# Patient Record
Sex: FEMALE | Race: WHITE | NOT HISPANIC OR LATINO | Employment: PART TIME | URBAN - METROPOLITAN AREA
[De-identification: names, ages, dates, MRNs, and addresses within clinical notes are randomized per-mention and may not be internally consistent; named-entity substitution may affect disease eponyms.]

---

## 2021-08-09 ENCOUNTER — APPOINTMENT (OUTPATIENT)
Dept: GENERAL RADIOLOGY | Facility: HOSPITAL | Age: 36
End: 2021-08-09

## 2021-08-09 ENCOUNTER — HOSPITAL ENCOUNTER (INPATIENT)
Facility: HOSPITAL | Age: 36
LOS: 6 days | Discharge: HOME | End: 2021-08-15
Attending: EMERGENCY MEDICINE | Admitting: INTERNAL MEDICINE

## 2021-08-09 DIAGNOSIS — D64.9 SEVERE ANEMIA: ICD-10-CM

## 2021-08-09 DIAGNOSIS — K92.0 HEMATEMESIS WITH NAUSEA: ICD-10-CM

## 2021-08-09 DIAGNOSIS — I95.9 HYPOTENSION, UNSPECIFIED HYPOTENSION TYPE: Primary | ICD-10-CM

## 2021-08-09 PROBLEM — A41.9 SEPSIS: Status: ACTIVE | Noted: 2021-08-09

## 2021-08-09 PROBLEM — D62 ACUTE BLOOD LOSS ANEMIA: Status: ACTIVE | Noted: 2021-08-09

## 2021-08-09 PROBLEM — R74.8 ELEVATED LIVER ENZYMES: Status: ACTIVE | Noted: 2021-08-09

## 2021-08-09 PROBLEM — K92.2 GI BLEED: Status: ACTIVE | Noted: 2021-08-09

## 2021-08-09 PROBLEM — N39.0 URINARY TRACT INFECTION: Status: ACTIVE | Noted: 2021-08-09

## 2021-08-09 PROBLEM — N17.9 ACUTE KIDNEY INJURY: Status: ACTIVE | Noted: 2021-08-09

## 2021-08-09 PROBLEM — E87.1 HYPONATREMIA: Status: ACTIVE | Noted: 2021-08-09

## 2021-08-09 LAB
ABO GROUP BLD: NORMAL
ALBUMIN SERPL-MCNC: 1.9 G/DL (ref 3.5–5.2)
ALBUMIN/GLOB SERPL: 0.8 G/DL
ALP SERPL-CCNC: 80 U/L (ref 39–117)
ALT SERPL W P-5'-P-CCNC: 32 U/L (ref 1–33)
ANION GAP SERPL CALCULATED.3IONS-SCNC: 12 MMOL/L (ref 5–15)
APTT PPP: 33.6 SECONDS (ref 24–31)
ARTERIAL PATENCY WRIST A: POSITIVE
AST SERPL-CCNC: 42 U/L (ref 1–32)
ATMOSPHERIC PRESS: ABNORMAL MM[HG]
BACTERIA UR QL AUTO: ABNORMAL /HPF
BASE EXCESS BLDA CALC-SCNC: -2.2 MMOL/L (ref 0–3)
BASOPHILS # BLD AUTO: 0.1 10*3/MM3 (ref 0–0.2)
BASOPHILS NFR BLD AUTO: 0.9 % (ref 0–1.5)
BDY SITE: ABNORMAL
BILIRUB SERPL-MCNC: 4.3 MG/DL (ref 0–1.2)
BILIRUB UR QL STRIP: ABNORMAL
BLD GP AB SCN SERPL QL: NEGATIVE
BUN SERPL-MCNC: 32 MG/DL (ref 6–20)
BUN/CREAT SERPL: 12.9 (ref 7–25)
CALCIUM SPEC-SCNC: 7 MG/DL (ref 8.6–10.5)
CHLORIDE SERPL-SCNC: 96 MMOL/L (ref 98–107)
CLARITY UR: ABNORMAL
CO2 BLDA-SCNC: 21.8 MMOL/L (ref 22–29)
CO2 SERPL-SCNC: 22 MMOL/L (ref 22–29)
COD CRY URNS QL: ABNORMAL /HPF
COLOR UR: ABNORMAL
CREAT SERPL-MCNC: 2.48 MG/DL (ref 0.57–1)
CRP SERPL-MCNC: 0.4 MG/DL (ref 0–0.5)
D-LACTATE SERPL-SCNC: 3.3 MMOL/L (ref 0.5–2)
DEPRECATED RDW RBC AUTO: 58.2 FL (ref 37–54)
EOSINOPHIL # BLD AUTO: 0 10*3/MM3 (ref 0–0.4)
EOSINOPHIL NFR BLD AUTO: 0.1 % (ref 0.3–6.2)
ERYTHROCYTE [DISTWIDTH] IN BLOOD BY AUTOMATED COUNT: 16.4 % (ref 12.3–15.4)
GFR SERPL CREATININE-BSD FRML MDRD: 22 ML/MIN/1.73
GLOBULIN UR ELPH-MCNC: 2.4 GM/DL
GLUCOSE SERPL-MCNC: 102 MG/DL (ref 65–99)
GLUCOSE UR STRIP-MCNC: NEGATIVE MG/DL
HCO3 BLDA-SCNC: 21 MMOL/L (ref 21–28)
HCT VFR BLD AUTO: 16.9 % (ref 34–46.6)
HEMODILUTION: NO
HGB BLD-MCNC: 5.6 G/DL (ref 12–15.9)
HGB UR QL STRIP.AUTO: ABNORMAL
HOLD SPECIMEN: NORMAL
HOLD SPECIMEN: NORMAL
HYALINE CASTS UR QL AUTO: ABNORMAL /LPF
INHALED O2 CONCENTRATION: 21 %
INR PPP: 1.88 (ref 0.93–1.1)
KETONES UR QL STRIP: ABNORMAL
LEUKOCYTE ESTERASE UR QL STRIP.AUTO: ABNORMAL
LYMPHOCYTES # BLD AUTO: 2.3 10*3/MM3 (ref 0.7–3.1)
LYMPHOCYTES NFR BLD AUTO: 15.4 % (ref 19.6–45.3)
MAGNESIUM SERPL-MCNC: 1.8 MG/DL (ref 1.6–2.6)
MCH RBC QN AUTO: 33.5 PG (ref 26.6–33)
MCHC RBC AUTO-ENTMCNC: 32.9 G/DL (ref 31.5–35.7)
MCV RBC AUTO: 101.8 FL (ref 79–97)
MODALITY: ABNORMAL
MONOCYTES # BLD AUTO: 1.1 10*3/MM3 (ref 0.1–0.9)
MONOCYTES NFR BLD AUTO: 7 % (ref 5–12)
MRSA DNA SPEC QL NAA+PROBE: NORMAL
NEUTROPHILS NFR BLD AUTO: 11.5 10*3/MM3 (ref 1.7–7)
NEUTROPHILS NFR BLD AUTO: 76.6 % (ref 42.7–76)
NITRITE UR QL STRIP: NEGATIVE
NRBC BLD AUTO-RTO: 0 /100 WBC (ref 0–0.2)
NT-PROBNP SERPL-MCNC: 124.3 PG/ML (ref 0–450)
PCO2 BLDA: 27.1 MM HG (ref 35–48)
PH BLDA: 7.5 PH UNITS (ref 7.35–7.45)
PH UR STRIP.AUTO: 5.5 [PH] (ref 5–8)
PHOSPHATE SERPL-MCNC: 4.6 MG/DL (ref 2.5–4.5)
PLATELET # BLD AUTO: 103 10*3/MM3 (ref 140–450)
PMV BLD AUTO: 9 FL (ref 6–12)
PO2 BLDA: 76.6 MM HG (ref 83–108)
POTASSIUM SERPL-SCNC: 4.8 MMOL/L (ref 3.5–5.2)
PROCALCITONIN SERPL-MCNC: 0.59 NG/ML (ref 0–0.25)
PROT SERPL-MCNC: 4.3 G/DL (ref 6–8.5)
PROT UR QL STRIP: ABNORMAL
PROTHROMBIN TIME: 19.9 SECONDS (ref 9.6–11.7)
RBC # BLD AUTO: 1.66 10*6/MM3 (ref 3.77–5.28)
RBC # UR: ABNORMAL /HPF
REF LAB TEST METHOD: ABNORMAL
RH BLD: POSITIVE
SAO2 % BLDCOA: 96.6 % (ref 94–98)
SARS-COV-2 RNA PNL SPEC NAA+PROBE: NOT DETECTED
SODIUM SERPL-SCNC: 130 MMOL/L (ref 136–145)
SP GR UR STRIP: 1.02 (ref 1–1.03)
SQUAMOUS #/AREA URNS HPF: ABNORMAL /HPF
T&S EXPIRATION DATE: NORMAL
TRANS CELLS #/AREA URNS HPF: ABNORMAL /HPF
UROBILINOGEN UR QL STRIP: ABNORMAL
WBC # BLD AUTO: 15 10*3/MM3 (ref 3.4–10.8)
WBC UR QL AUTO: ABNORMAL /HPF
YEAST URNS QL MICRO: ABNORMAL /HPF

## 2021-08-09 PROCEDURE — 83735 ASSAY OF MAGNESIUM: CPT

## 2021-08-09 PROCEDURE — 84100 ASSAY OF PHOSPHORUS: CPT

## 2021-08-09 PROCEDURE — 71045 X-RAY EXAM CHEST 1 VIEW: CPT

## 2021-08-09 PROCEDURE — 86900 BLOOD TYPING SEROLOGIC ABO: CPT

## 2021-08-09 PROCEDURE — 86923 COMPATIBILITY TEST ELECTRIC: CPT

## 2021-08-09 PROCEDURE — 36600 WITHDRAWAL OF ARTERIAL BLOOD: CPT

## 2021-08-09 PROCEDURE — 25010000002 OCTREOTIDE PER 25 MCG: Performed by: EMERGENCY MEDICINE

## 2021-08-09 PROCEDURE — 86850 RBC ANTIBODY SCREEN: CPT | Performed by: EMERGENCY MEDICINE

## 2021-08-09 PROCEDURE — 84145 PROCALCITONIN (PCT): CPT | Performed by: NURSE PRACTITIONER

## 2021-08-09 PROCEDURE — 86901 BLOOD TYPING SEROLOGIC RH(D): CPT | Performed by: EMERGENCY MEDICINE

## 2021-08-09 PROCEDURE — 36415 COLL VENOUS BLD VENIPUNCTURE: CPT | Performed by: EMERGENCY MEDICINE

## 2021-08-09 PROCEDURE — 80074 ACUTE HEPATITIS PANEL: CPT | Performed by: NURSE PRACTITIONER

## 2021-08-09 PROCEDURE — 87040 BLOOD CULTURE FOR BACTERIA: CPT

## 2021-08-09 PROCEDURE — 86900 BLOOD TYPING SEROLOGIC ABO: CPT | Performed by: EMERGENCY MEDICINE

## 2021-08-09 PROCEDURE — 85610 PROTHROMBIN TIME: CPT

## 2021-08-09 PROCEDURE — 82105 ALPHA-FETOPROTEIN SERUM: CPT | Performed by: NURSE PRACTITIONER

## 2021-08-09 PROCEDURE — 85730 THROMBOPLASTIN TIME PARTIAL: CPT

## 2021-08-09 PROCEDURE — 87086 URINE CULTURE/COLONY COUNT: CPT | Performed by: NURSE PRACTITIONER

## 2021-08-09 PROCEDURE — 85025 COMPLETE CBC W/AUTO DIFF WBC: CPT

## 2021-08-09 PROCEDURE — P9016 RBC LEUKOCYTES REDUCED: HCPCS

## 2021-08-09 PROCEDURE — 86901 BLOOD TYPING SEROLOGIC RH(D): CPT

## 2021-08-09 PROCEDURE — 86140 C-REACTIVE PROTEIN: CPT

## 2021-08-09 PROCEDURE — 87635 SARS-COV-2 COVID-19 AMP PRB: CPT | Performed by: EMERGENCY MEDICINE

## 2021-08-09 PROCEDURE — 25010000002 CEFTRIAXONE PER 250 MG: Performed by: NURSE PRACTITIONER

## 2021-08-09 PROCEDURE — 99285 EMERGENCY DEPT VISIT HI MDM: CPT

## 2021-08-09 PROCEDURE — 83605 ASSAY OF LACTIC ACID: CPT

## 2021-08-09 PROCEDURE — 36430 TRANSFUSION BLD/BLD COMPNT: CPT

## 2021-08-09 PROCEDURE — 86038 ANTINUCLEAR ANTIBODIES: CPT | Performed by: NURSE PRACTITIONER

## 2021-08-09 PROCEDURE — 82803 BLOOD GASES ANY COMBINATION: CPT

## 2021-08-09 PROCEDURE — 81001 URINALYSIS AUTO W/SCOPE: CPT

## 2021-08-09 PROCEDURE — 93005 ELECTROCARDIOGRAM TRACING: CPT

## 2021-08-09 PROCEDURE — P9612 CATHETERIZE FOR URINE SPEC: HCPCS

## 2021-08-09 PROCEDURE — 87641 MR-STAPH DNA AMP PROBE: CPT | Performed by: NURSE PRACTITIONER

## 2021-08-09 PROCEDURE — 83880 ASSAY OF NATRIURETIC PEPTIDE: CPT | Performed by: NURSE PRACTITIONER

## 2021-08-09 PROCEDURE — 80053 COMPREHEN METABOLIC PANEL: CPT

## 2021-08-09 RX ORDER — ONDANSETRON 2 MG/ML
4 INJECTION INTRAMUSCULAR; INTRAVENOUS EVERY 6 HOURS PRN
Status: DISCONTINUED | OUTPATIENT
Start: 2021-08-09 | End: 2021-08-15 | Stop reason: HOSPADM

## 2021-08-09 RX ORDER — SODIUM CHLORIDE 0.9 % (FLUSH) 0.9 %
10 SYRINGE (ML) INJECTION AS NEEDED
Status: CANCELLED | OUTPATIENT
Start: 2021-08-09

## 2021-08-09 RX ORDER — SODIUM CHLORIDE 0.9 % (FLUSH) 0.9 %
10 SYRINGE (ML) INJECTION AS NEEDED
Status: DISCONTINUED | OUTPATIENT
Start: 2021-08-09 | End: 2021-08-15 | Stop reason: HOSPADM

## 2021-08-09 RX ORDER — IBUPROFEN 200 MG
600 TABLET ORAL EVERY 6 HOURS PRN
COMMUNITY
End: 2021-08-15 | Stop reason: HOSPADM

## 2021-08-09 RX ORDER — ACETAMINOPHEN 325 MG/1
650 TABLET ORAL EVERY 4 HOURS PRN
Status: DISCONTINUED | OUTPATIENT
Start: 2021-08-09 | End: 2021-08-15 | Stop reason: HOSPADM

## 2021-08-09 RX ORDER — ACETAMINOPHEN 650 MG/1
650 SUPPOSITORY RECTAL EVERY 4 HOURS PRN
Status: DISCONTINUED | OUTPATIENT
Start: 2021-08-09 | End: 2021-08-15 | Stop reason: HOSPADM

## 2021-08-09 RX ORDER — SODIUM CHLORIDE 9 MG/ML
100 INJECTION, SOLUTION INTRAVENOUS CONTINUOUS
Status: DISCONTINUED | OUTPATIENT
Start: 2021-08-09 | End: 2021-08-11

## 2021-08-09 RX ORDER — SODIUM CHLORIDE 0.9 % (FLUSH) 0.9 %
10 SYRINGE (ML) INJECTION EVERY 12 HOURS SCHEDULED
Status: DISCONTINUED | OUTPATIENT
Start: 2021-08-09 | End: 2021-08-15 | Stop reason: HOSPADM

## 2021-08-09 RX ORDER — ONDANSETRON 4 MG/1
4 TABLET, FILM COATED ORAL EVERY 6 HOURS PRN
Status: DISCONTINUED | OUTPATIENT
Start: 2021-08-09 | End: 2021-08-15 | Stop reason: HOSPADM

## 2021-08-09 RX ADMIN — SODIUM CHLORIDE 100 ML/HR: 9 INJECTION, SOLUTION INTRAVENOUS at 20:42

## 2021-08-09 RX ADMIN — SODIUM CHLORIDE 1000 ML: 0.9 INJECTION, SOLUTION INTRAVENOUS at 18:24

## 2021-08-09 RX ADMIN — WATER 1 G: 100 INJECTION, SOLUTION INTRAVENOUS at 20:30

## 2021-08-09 RX ADMIN — OCTREOTIDE ACETATE 25 MCG/HR: 500 INJECTION, SOLUTION INTRAVENOUS; SUBCUTANEOUS at 19:32

## 2021-08-09 RX ADMIN — FAMOTIDINE 20 MG: 10 INJECTION INTRAVENOUS at 18:35

## 2021-08-10 ENCOUNTER — APPOINTMENT (OUTPATIENT)
Dept: CARDIOLOGY | Facility: HOSPITAL | Age: 36
End: 2021-08-10

## 2021-08-10 ENCOUNTER — ANESTHESIA EVENT (OUTPATIENT)
Dept: GASTROENTEROLOGY | Facility: HOSPITAL | Age: 36
End: 2021-08-10

## 2021-08-10 ENCOUNTER — ANESTHESIA (OUTPATIENT)
Dept: GASTROENTEROLOGY | Facility: HOSPITAL | Age: 36
End: 2021-08-10

## 2021-08-10 ENCOUNTER — INPATIENT HOSPITAL (OUTPATIENT)
Dept: URBAN - METROPOLITAN AREA HOSPITAL 84 | Facility: HOSPITAL | Age: 36
End: 2021-08-10
Payer: COMMERCIAL

## 2021-08-10 VITALS — HEART RATE: 98 BPM | DIASTOLIC BLOOD PRESSURE: 60 MMHG | OXYGEN SATURATION: 100 % | SYSTOLIC BLOOD PRESSURE: 101 MMHG

## 2021-08-10 DIAGNOSIS — A41.9 SEPSIS, UNSPECIFIED ORGANISM: ICD-10-CM

## 2021-08-10 DIAGNOSIS — D50.0 IRON DEFICIENCY ANEMIA SECONDARY TO BLOOD LOSS (CHRONIC): ICD-10-CM

## 2021-08-10 DIAGNOSIS — R74.8 ABNORMAL LEVELS OF OTHER SERUM ENZYMES: ICD-10-CM

## 2021-08-10 DIAGNOSIS — B18.2 CHRONIC VIRAL HEPATITIS C: ICD-10-CM

## 2021-08-10 DIAGNOSIS — F10.11 ALCOHOL ABUSE, IN REMISSION: ICD-10-CM

## 2021-08-10 DIAGNOSIS — I85.00 ESOPHAGEAL VARICES WITHOUT BLEEDING: ICD-10-CM

## 2021-08-10 DIAGNOSIS — K92.0 HEMATEMESIS: ICD-10-CM

## 2021-08-10 DIAGNOSIS — K70.30 ALCOHOLIC CIRRHOSIS OF LIVER WITHOUT ASCITES: ICD-10-CM

## 2021-08-10 DIAGNOSIS — N17.9 ACUTE KIDNEY FAILURE, UNSPECIFIED: ICD-10-CM

## 2021-08-10 LAB
ALBUMIN SERPL-MCNC: 1.9 G/DL (ref 3.5–5.2)
ALBUMIN/GLOB SERPL: 0.8 G/DL
ALP SERPL-CCNC: 77 U/L (ref 39–117)
ALPHA-FETOPROTEIN: 4 NG/ML (ref 0–8.3)
ALPHA1 GLOB MFR UR ELPH: 133 MG/DL (ref 90–200)
ALT SERPL W P-5'-P-CCNC: 35 U/L (ref 1–33)
ANION GAP SERPL CALCULATED.3IONS-SCNC: 11 MMOL/L (ref 5–15)
APAP SERPL-MCNC: <5 MCG/ML (ref 0–30)
AST SERPL-CCNC: 53 U/L (ref 1–32)
B-HCG UR QL: NEGATIVE
BACTERIA SPEC AEROBE CULT: ABNORMAL
BASOPHILS # BLD AUTO: 0.1 10*3/MM3 (ref 0–0.2)
BASOPHILS # BLD AUTO: 0.1 10*3/MM3 (ref 0–0.2)
BASOPHILS NFR BLD AUTO: 0.5 % (ref 0–1.5)
BASOPHILS NFR BLD AUTO: 0.6 % (ref 0–1.5)
BILIRUB SERPL-MCNC: 4.2 MG/DL (ref 0–1.2)
BUN SERPL-MCNC: 33 MG/DL (ref 6–20)
BUN/CREAT SERPL: 13.4 (ref 7–25)
CALCIUM SPEC-SCNC: 6.4 MG/DL (ref 8.6–10.5)
CERULOPLASMIN SERPL-MCNC: 12 MG/DL (ref 19–39)
CHLORIDE SERPL-SCNC: 98 MMOL/L (ref 98–107)
CK SERPL-CCNC: 75 U/L (ref 20–180)
CO2 SERPL-SCNC: 20 MMOL/L (ref 22–29)
CORTIS SERPL-MCNC: 6.24 MCG/DL
CREAT SERPL-MCNC: 2.47 MG/DL (ref 0.57–1)
D-LACTATE SERPL-SCNC: 1.3 MMOL/L (ref 0.5–2)
D-LACTATE SERPL-SCNC: 1.3 MMOL/L (ref 0.5–2)
D-LACTATE SERPL-SCNC: 1.6 MMOL/L (ref 0.5–2)
D-LACTATE SERPL-SCNC: 2.4 MMOL/L (ref 0.5–2)
D-LACTATE SERPL-SCNC: 2.8 MMOL/L (ref 0.5–2)
DEPRECATED RDW RBC AUTO: 54.3 FL (ref 37–54)
DEPRECATED RDW RBC AUTO: 54.7 FL (ref 37–54)
EOSINOPHIL # BLD AUTO: 0 10*3/MM3 (ref 0–0.4)
EOSINOPHIL # BLD AUTO: 0.1 10*3/MM3 (ref 0–0.4)
EOSINOPHIL NFR BLD AUTO: 0.1 % (ref 0.3–6.2)
EOSINOPHIL NFR BLD AUTO: 0.7 % (ref 0.3–6.2)
EOSINOPHIL SPEC QL MICRO: 0 % EOS/100 CELLS (ref 0–0)
ERYTHROCYTE [DISTWIDTH] IN BLOOD BY AUTOMATED COUNT: 16.3 % (ref 12.3–15.4)
ERYTHROCYTE [DISTWIDTH] IN BLOOD BY AUTOMATED COUNT: 16.5 % (ref 12.3–15.4)
FERRITIN SERPL-MCNC: 59.33 NG/ML (ref 13–150)
GFR SERPL CREATININE-BSD FRML MDRD: 22 ML/MIN/1.73
GGT SERPL-CCNC: 25 U/L (ref 5–36)
GLOBULIN UR ELPH-MCNC: 2.4 GM/DL
GLUCOSE BLDC GLUCOMTR-MCNC: 124 MG/DL (ref 70–105)
GLUCOSE BLDC GLUCOMTR-MCNC: 126 MG/DL (ref 70–105)
GLUCOSE SERPL-MCNC: 107 MG/DL (ref 65–99)
HAV IGM SERPL QL IA: ABNORMAL
HBV CORE IGM SERPL QL IA: ABNORMAL
HBV SURFACE AG SERPL QL IA: ABNORMAL
HCT VFR BLD AUTO: 20 % (ref 34–46.6)
HCT VFR BLD AUTO: 20.9 % (ref 34–46.6)
HCT VFR BLD AUTO: 25.4 % (ref 34–46.6)
HCV AB SER DONR QL: REACTIVE
HGB BLD-MCNC: 6.8 G/DL (ref 12–15.9)
HGB BLD-MCNC: 7 G/DL (ref 12–15.9)
HGB BLD-MCNC: 8.7 G/DL (ref 12–15.9)
INR PPP: 1.93 (ref 0.93–1.1)
IRON 24H UR-MRATE: 126 MCG/DL (ref 37–145)
LYMPHOCYTES # BLD AUTO: 2.3 10*3/MM3 (ref 0.7–3.1)
LYMPHOCYTES # BLD AUTO: 4.7 10*3/MM3 (ref 0.7–3.1)
LYMPHOCYTES NFR BLD AUTO: 17.1 % (ref 19.6–45.3)
LYMPHOCYTES NFR BLD AUTO: 26.2 % (ref 19.6–45.3)
MAGNESIUM SERPL-MCNC: 1.8 MG/DL (ref 1.6–2.6)
MCH RBC QN AUTO: 32 PG (ref 26.6–33)
MCH RBC QN AUTO: 33.1 PG (ref 26.6–33)
MCHC RBC AUTO-ENTMCNC: 33.3 G/DL (ref 31.5–35.7)
MCHC RBC AUTO-ENTMCNC: 34.2 G/DL (ref 31.5–35.7)
MCV RBC AUTO: 96.1 FL (ref 79–97)
MCV RBC AUTO: 96.9 FL (ref 79–97)
MONOCYTES # BLD AUTO: 0.8 10*3/MM3 (ref 0.1–0.9)
MONOCYTES # BLD AUTO: 1.9 10*3/MM3 (ref 0.1–0.9)
MONOCYTES NFR BLD AUTO: 10.7 % (ref 5–12)
MONOCYTES NFR BLD AUTO: 6.2 % (ref 5–12)
NEUTROPHILS NFR BLD AUTO: 10.1 10*3/MM3 (ref 1.7–7)
NEUTROPHILS NFR BLD AUTO: 11.1 10*3/MM3 (ref 1.7–7)
NEUTROPHILS NFR BLD AUTO: 61.9 % (ref 42.7–76)
NEUTROPHILS NFR BLD AUTO: 76 % (ref 42.7–76)
NRBC BLD AUTO-RTO: 0.1 /100 WBC (ref 0–0.2)
NRBC BLD AUTO-RTO: 0.1 /100 WBC (ref 0–0.2)
PHOSPHATE SERPL-MCNC: 4.2 MG/DL (ref 2.5–4.5)
PLATELET # BLD AUTO: 115 10*3/MM3 (ref 140–450)
PLATELET # BLD AUTO: 83 10*3/MM3 (ref 140–450)
PMV BLD AUTO: 8.6 FL (ref 6–12)
PMV BLD AUTO: 8.8 FL (ref 6–12)
POTASSIUM SERPL-SCNC: 4.3 MMOL/L (ref 3.5–5.2)
PROT SERPL-MCNC: 4.3 G/DL (ref 6–8.5)
PROTHROMBIN TIME: 20.4 SECONDS (ref 9.6–11.7)
PTH-INTACT SERPL-MCNC: 249.3 PG/ML (ref 15–65)
QT INTERVAL: 357 MS
RBC # BLD AUTO: 2.17 10*6/MM3 (ref 3.77–5.28)
RBC # BLD AUTO: 2.63 10*6/MM3 (ref 3.77–5.28)
SODIUM SERPL-SCNC: 129 MMOL/L (ref 136–145)
SODIUM UR-SCNC: 22 MMOL/L
TSH SERPL DL<=0.05 MIU/L-ACNC: 0.16 UIU/ML (ref 0.27–4.2)
URATE SERPL-MCNC: 5.3 MG/DL (ref 2.4–5.7)
WBC # BLD AUTO: 13.3 10*3/MM3 (ref 3.4–10.8)
WBC # BLD AUTO: 18 10*3/MM3 (ref 3.4–10.8)

## 2021-08-10 PROCEDURE — 83516 IMMUNOASSAY NONANTIBODY: CPT | Performed by: NURSE PRACTITIONER

## 2021-08-10 PROCEDURE — 43244 EGD VARICES LIGATION: CPT | Performed by: INTERNAL MEDICINE

## 2021-08-10 PROCEDURE — 85014 HEMATOCRIT: CPT | Performed by: NURSE PRACTITIONER

## 2021-08-10 PROCEDURE — 25010000002 ONDANSETRON PER 1 MG: Performed by: NURSE PRACTITIONER

## 2021-08-10 PROCEDURE — P9016 RBC LEUKOCYTES REDUCED: HCPCS

## 2021-08-10 PROCEDURE — 80053 COMPREHEN METABOLIC PANEL: CPT | Performed by: NURSE PRACTITIONER

## 2021-08-10 PROCEDURE — 25010000002 METOCLOPRAMIDE PER 10 MG: Performed by: INTERNAL MEDICINE

## 2021-08-10 PROCEDURE — 25010000003 PHYTONADIONE 10 MG/ML SOLUTION: Performed by: INTERNAL MEDICINE

## 2021-08-10 PROCEDURE — P9047 ALBUMIN (HUMAN), 25%, 50ML: HCPCS | Performed by: INTERNAL MEDICINE

## 2021-08-10 PROCEDURE — 25010000002 ALBUMIN HUMAN 25% PER 50 ML: Performed by: INTERNAL MEDICINE

## 2021-08-10 PROCEDURE — 25010000002 OCTREOTIDE PER 25 MCG: Performed by: EMERGENCY MEDICINE

## 2021-08-10 PROCEDURE — 83540 ASSAY OF IRON: CPT | Performed by: NURSE PRACTITIONER

## 2021-08-10 PROCEDURE — 87902 NFCT AGT GNTYP ALYS HEP C: CPT | Performed by: NURSE PRACTITIONER

## 2021-08-10 PROCEDURE — 82103 ALPHA-1-ANTITRYPSIN TOTAL: CPT | Performed by: NURSE PRACTITIONER

## 2021-08-10 PROCEDURE — 06L38CZ OCCLUSION OF ESOPHAGEAL VEIN WITH EXTRALUMINAL DEVICE, VIA NATURAL OR ARTIFICIAL OPENING ENDOSCOPIC: ICD-10-PCS | Performed by: INTERNAL MEDICINE

## 2021-08-10 PROCEDURE — 80143 DRUG ASSAY ACETAMINOPHEN: CPT | Performed by: NURSE PRACTITIONER

## 2021-08-10 PROCEDURE — 36430 TRANSFUSION BLD/BLD COMPNT: CPT

## 2021-08-10 PROCEDURE — 25010000002 MAGNESIUM SULFATE IN D5W 1G/100ML (PREMIX) 1-5 GM/100ML-% SOLUTION: Performed by: INTERNAL MEDICINE

## 2021-08-10 PROCEDURE — 87205 SMEAR GRAM STAIN: CPT | Performed by: INTERNAL MEDICINE

## 2021-08-10 PROCEDURE — 83605 ASSAY OF LACTIC ACID: CPT | Performed by: NURSE PRACTITIONER

## 2021-08-10 PROCEDURE — 82550 ASSAY OF CK (CPK): CPT | Performed by: INTERNAL MEDICINE

## 2021-08-10 PROCEDURE — 25010000002 PROPOFOL 10 MG/ML EMULSION

## 2021-08-10 PROCEDURE — 86900 BLOOD TYPING SEROLOGIC ABO: CPT

## 2021-08-10 PROCEDURE — 25010000002 CALCIUM GLUCONATE 2-0.675 GM/100ML-% SOLUTION: Performed by: INTERNAL MEDICINE

## 2021-08-10 PROCEDURE — 82533 TOTAL CORTISOL: CPT | Performed by: INTERNAL MEDICINE

## 2021-08-10 PROCEDURE — 93306 TTE W/DOPPLER COMPLETE: CPT

## 2021-08-10 PROCEDURE — 85610 PROTHROMBIN TIME: CPT | Performed by: INTERNAL MEDICINE

## 2021-08-10 PROCEDURE — C1751 CATH, INF, PER/CENT/MIDLINE: HCPCS

## 2021-08-10 PROCEDURE — 82390 ASSAY OF CERULOPLASMIN: CPT | Performed by: NURSE PRACTITIONER

## 2021-08-10 PROCEDURE — 86376 MICROSOMAL ANTIBODY EACH: CPT | Performed by: NURSE PRACTITIONER

## 2021-08-10 PROCEDURE — 87522 HEPATITIS C REVRS TRNSCRPJ: CPT | Performed by: NURSE PRACTITIONER

## 2021-08-10 PROCEDURE — 83970 ASSAY OF PARATHORMONE: CPT | Performed by: INTERNAL MEDICINE

## 2021-08-10 PROCEDURE — 25010000002 ALBUMIN HUMAN 25% PER 50 ML: Performed by: NURSE PRACTITIONER

## 2021-08-10 PROCEDURE — 85025 COMPLETE CBC W/AUTO DIFF WBC: CPT | Performed by: INTERNAL MEDICINE

## 2021-08-10 PROCEDURE — 25010000002 CEFTRIAXONE PER 250 MG: Performed by: NURSE PRACTITIONER

## 2021-08-10 PROCEDURE — 81025 URINE PREGNANCY TEST: CPT | Performed by: INTERNAL MEDICINE

## 2021-08-10 PROCEDURE — P9047 ALBUMIN (HUMAN), 25%, 50ML: HCPCS | Performed by: NURSE PRACTITIONER

## 2021-08-10 PROCEDURE — 85018 HEMOGLOBIN: CPT | Performed by: NURSE PRACTITIONER

## 2021-08-10 PROCEDURE — 83735 ASSAY OF MAGNESIUM: CPT | Performed by: NURSE PRACTITIONER

## 2021-08-10 PROCEDURE — 85025 COMPLETE CBC W/AUTO DIFF WBC: CPT | Performed by: NURSE PRACTITIONER

## 2021-08-10 PROCEDURE — 84443 ASSAY THYROID STIM HORMONE: CPT | Performed by: INTERNAL MEDICINE

## 2021-08-10 PROCEDURE — 84300 ASSAY OF URINE SODIUM: CPT | Performed by: INTERNAL MEDICINE

## 2021-08-10 PROCEDURE — 82962 GLUCOSE BLOOD TEST: CPT

## 2021-08-10 PROCEDURE — 82728 ASSAY OF FERRITIN: CPT | Performed by: NURSE PRACTITIONER

## 2021-08-10 PROCEDURE — 84550 ASSAY OF BLOOD/URIC ACID: CPT | Performed by: INTERNAL MEDICINE

## 2021-08-10 PROCEDURE — 84100 ASSAY OF PHOSPHORUS: CPT | Performed by: NURSE PRACTITIONER

## 2021-08-10 PROCEDURE — 93306 TTE W/DOPPLER COMPLETE: CPT | Performed by: INTERNAL MEDICINE

## 2021-08-10 PROCEDURE — 82977 ASSAY OF GGT: CPT | Performed by: NURSE PRACTITIONER

## 2021-08-10 PROCEDURE — 02HV33Z INSERTION OF INFUSION DEVICE INTO SUPERIOR VENA CAVA, PERCUTANEOUS APPROACH: ICD-10-PCS | Performed by: INTERNAL MEDICINE

## 2021-08-10 PROCEDURE — 84165 PROTEIN E-PHORESIS SERUM: CPT | Performed by: INTERNAL MEDICINE

## 2021-08-10 RX ORDER — ALBUMIN (HUMAN) 12.5 G/50ML
25 SOLUTION INTRAVENOUS EVERY 8 HOURS
Status: COMPLETED | OUTPATIENT
Start: 2021-08-10 | End: 2021-08-11

## 2021-08-10 RX ORDER — PROPOFOL 10 MG/ML
VIAL (ML) INTRAVENOUS AS NEEDED
Status: DISCONTINUED | OUTPATIENT
Start: 2021-08-10 | End: 2021-08-10 | Stop reason: SURG

## 2021-08-10 RX ORDER — ZINC SULFATE 50(220)MG
220 CAPSULE ORAL DAILY
Status: DISCONTINUED | OUTPATIENT
Start: 2021-08-10 | End: 2021-08-15 | Stop reason: HOSPADM

## 2021-08-10 RX ORDER — PHYTONADIONE 10 MG/ML
5 INJECTION, EMULSION INTRAMUSCULAR; INTRAVENOUS; SUBCUTANEOUS ONCE
Status: COMPLETED | OUTPATIENT
Start: 2021-08-10 | End: 2021-08-10

## 2021-08-10 RX ORDER — EPINEPHRINE 0.1 MG/ML
SYRINGE (ML) INJECTION
Status: DISCONTINUED
Start: 2021-08-10 | End: 2021-08-10 | Stop reason: WASHOUT

## 2021-08-10 RX ORDER — FOLIC ACID 1 MG/1
1 TABLET ORAL DAILY
Status: DISCONTINUED | OUTPATIENT
Start: 2021-08-10 | End: 2021-08-15 | Stop reason: HOSPADM

## 2021-08-10 RX ORDER — ALBUMIN (HUMAN) 12.5 G/50ML
25 SOLUTION INTRAVENOUS ONCE
Status: COMPLETED | OUTPATIENT
Start: 2021-08-10 | End: 2021-08-10

## 2021-08-10 RX ORDER — MAGNESIUM SULFATE 1 G/100ML
1 INJECTION INTRAVENOUS ONCE
Status: COMPLETED | OUTPATIENT
Start: 2021-08-10 | End: 2021-08-10

## 2021-08-10 RX ORDER — PANTOPRAZOLE SODIUM 40 MG/10ML
40 INJECTION, POWDER, LYOPHILIZED, FOR SOLUTION INTRAVENOUS EVERY 12 HOURS SCHEDULED
Status: DISCONTINUED | OUTPATIENT
Start: 2021-08-10 | End: 2021-08-13

## 2021-08-10 RX ORDER — ALCOHOL 0.98 ML/ML
INJECTION INTRASPINAL
Status: DISCONTINUED
Start: 2021-08-10 | End: 2021-08-10 | Stop reason: WASHOUT

## 2021-08-10 RX ORDER — PREDNISONE 20 MG/1
40 TABLET ORAL
Status: DISCONTINUED | OUTPATIENT
Start: 2021-08-10 | End: 2021-08-12

## 2021-08-10 RX ORDER — CALCIUM GLUCONATE 20 MG/ML
2 INJECTION, SOLUTION INTRAVENOUS EVERY 12 HOURS SCHEDULED
Status: COMPLETED | OUTPATIENT
Start: 2021-08-10 | End: 2021-08-10

## 2021-08-10 RX ORDER — METOCLOPRAMIDE HYDROCHLORIDE 5 MG/ML
10 INJECTION INTRAMUSCULAR; INTRAVENOUS ONCE
Status: COMPLETED | OUTPATIENT
Start: 2021-08-10 | End: 2021-08-10

## 2021-08-10 RX ORDER — NOREPINEPHRINE BIT/0.9 % NACL 8 MG/250ML
.02-.3 INFUSION BOTTLE (ML) INTRAVENOUS
Status: DISCONTINUED | OUTPATIENT
Start: 2021-08-10 | End: 2021-08-14

## 2021-08-10 RX ORDER — SODIUM TETRADECYL SULFATE 30 MG/ML
INJECTION, SOLUTION INTRAVENOUS
Status: DISCONTINUED
Start: 2021-08-10 | End: 2021-08-10 | Stop reason: WASHOUT

## 2021-08-10 RX ORDER — SUCRALFATE 1 G/1
1 TABLET ORAL
Status: DISCONTINUED | OUTPATIENT
Start: 2021-08-10 | End: 2021-08-15 | Stop reason: HOSPADM

## 2021-08-10 RX ADMIN — WATER 1 G: 100 INJECTION, SOLUTION INTRAVENOUS at 19:19

## 2021-08-10 RX ADMIN — PROPOFOL 100 MG: 10 INJECTION, EMULSION INTRAVENOUS at 15:02

## 2021-08-10 RX ADMIN — Medication 10 ML: at 20:09

## 2021-08-10 RX ADMIN — OCTREOTIDE ACETATE 25 MCG/HR: 500 INJECTION, SOLUTION INTRAVENOUS; SUBCUTANEOUS at 04:05

## 2021-08-10 RX ADMIN — PANTOPRAZOLE SODIUM 40 MG: 40 INJECTION, POWDER, FOR SOLUTION INTRAVENOUS at 11:27

## 2021-08-10 RX ADMIN — PHYTONADIONE 5 MG: 10 INJECTION, EMULSION INTRAMUSCULAR; INTRAVENOUS; SUBCUTANEOUS at 11:27

## 2021-08-10 RX ADMIN — Medication 0.04 MCG/KG/MIN: at 02:37

## 2021-08-10 RX ADMIN — SODIUM BICARBONATE 50 MEQ: 84 INJECTION, SOLUTION INTRAVENOUS at 13:15

## 2021-08-10 RX ADMIN — METOCLOPRAMIDE 10 MG: 5 INJECTION, SOLUTION INTRAMUSCULAR; INTRAVENOUS at 06:50

## 2021-08-10 RX ADMIN — ONDANSETRON 4 MG: 2 INJECTION INTRAMUSCULAR; INTRAVENOUS at 15:35

## 2021-08-10 RX ADMIN — PANTOPRAZOLE SODIUM 40 MG: 40 INJECTION, POWDER, FOR SOLUTION INTRAVENOUS at 20:09

## 2021-08-10 RX ADMIN — SODIUM CHLORIDE 100 ML/HR: 9 INJECTION, SOLUTION INTRAVENOUS at 18:32

## 2021-08-10 RX ADMIN — SODIUM CHLORIDE 500 ML: 9 INJECTION, SOLUTION INTRAVENOUS at 06:34

## 2021-08-10 RX ADMIN — ALBUMIN HUMAN 25 G: 0.25 SOLUTION INTRAVENOUS at 11:27

## 2021-08-10 RX ADMIN — PROPOFOL 100 MG: 10 INJECTION, EMULSION INTRAVENOUS at 14:59

## 2021-08-10 RX ADMIN — MAGNESIUM SULFATE HEPTAHYDRATE 1 G: 1 INJECTION, SOLUTION INTRAVENOUS at 13:15

## 2021-08-10 RX ADMIN — SUCRALFATE 1 G: 1 TABLET ORAL at 18:21

## 2021-08-10 RX ADMIN — PROPOFOL 50 MG: 10 INJECTION, EMULSION INTRAVENOUS at 15:09

## 2021-08-10 RX ADMIN — SUCRALFATE 1 G: 1 TABLET ORAL at 20:09

## 2021-08-10 RX ADMIN — ALBUMIN HUMAN 25 G: 0.25 SOLUTION INTRAVENOUS at 18:21

## 2021-08-10 RX ADMIN — Medication 0.25 MCG/KG/MIN: at 18:20

## 2021-08-10 RX ADMIN — CALCIUM GLUCONATE 2 G: 20 INJECTION, SOLUTION INTRAVENOUS at 13:15

## 2021-08-10 RX ADMIN — CALCIUM GLUCONATE 2 G: 20 INJECTION, SOLUTION INTRAVENOUS at 20:09

## 2021-08-10 RX ADMIN — ALBUMIN HUMAN 25 G: 0.25 SOLUTION INTRAVENOUS at 01:42

## 2021-08-10 RX ADMIN — OCTREOTIDE ACETATE 25 MCG/HR: 500 INJECTION, SOLUTION INTRAVENOUS; SUBCUTANEOUS at 18:20

## 2021-08-10 RX ADMIN — Medication 10 ML: at 08:09

## 2021-08-10 RX ADMIN — Medication 0.25 MCG/KG/MIN: at 10:40

## 2021-08-10 NOTE — ANESTHESIA PREPROCEDURE EVALUATION
Anesthesia Evaluation     Patient summary reviewed and Nursing notes reviewed   NPO Solid Status: > 6 hours  NPO Liquid Status: > 6 hours           Airway   Mallampati: II  TM distance: >3 FB  Neck ROM: full  No difficulty expected  Dental - normal exam     Pulmonary - negative pulmonary ROS and normal exam    breath sounds clear to auscultation  Cardiovascular - negative cardio ROS and normal exam    ECG reviewed  Rhythm: regular  Rate: normal        Neuro/Psych- negative ROS  GI/Hepatic/Renal/Endo    (+)  GI bleeding , renal disease,     Musculoskeletal (-) negative ROS    Abdominal  - normal exam    Abdomen: soft.  Bowel sounds: normal.   Substance History - negative use     OB/GYN negative ob/gyn ROS         Other - negative ROS                       Anesthesia Plan    ASA 4 - emergent     MAC     intravenous induction     Anesthetic plan, all risks, benefits, and alternatives have been provided, discussed and informed consent has been obtained with: patient.  Use of blood products discussed with patient .   Plan discussed with CAA.

## 2021-08-11 ENCOUNTER — APPOINTMENT (OUTPATIENT)
Dept: ULTRASOUND IMAGING | Facility: HOSPITAL | Age: 36
End: 2021-08-11

## 2021-08-11 ENCOUNTER — APPOINTMENT (OUTPATIENT)
Dept: GENERAL RADIOLOGY | Facility: HOSPITAL | Age: 36
End: 2021-08-11

## 2021-08-11 ENCOUNTER — INPATIENT HOSPITAL (OUTPATIENT)
Dept: URBAN - METROPOLITAN AREA HOSPITAL 84 | Facility: HOSPITAL | Age: 36
End: 2021-08-11
Payer: COMMERCIAL

## 2021-08-11 DIAGNOSIS — R74.8 ABNORMAL LEVELS OF OTHER SERUM ENZYMES: ICD-10-CM

## 2021-08-11 DIAGNOSIS — B18.2 CHRONIC VIRAL HEPATITIS C: ICD-10-CM

## 2021-08-11 DIAGNOSIS — I85.00 ESOPHAGEAL VARICES WITHOUT BLEEDING: ICD-10-CM

## 2021-08-11 DIAGNOSIS — F10.11 ALCOHOL ABUSE, IN REMISSION: ICD-10-CM

## 2021-08-11 DIAGNOSIS — A41.9 SEPSIS, UNSPECIFIED ORGANISM: ICD-10-CM

## 2021-08-11 DIAGNOSIS — D50.0 IRON DEFICIENCY ANEMIA SECONDARY TO BLOOD LOSS (CHRONIC): ICD-10-CM

## 2021-08-11 DIAGNOSIS — K70.30 ALCOHOLIC CIRRHOSIS OF LIVER WITHOUT ASCITES: ICD-10-CM

## 2021-08-11 DIAGNOSIS — K92.0 HEMATEMESIS: ICD-10-CM

## 2021-08-11 DIAGNOSIS — N17.9 ACUTE KIDNEY FAILURE, UNSPECIFIED: ICD-10-CM

## 2021-08-11 LAB
ACTIN IGG SERPL-ACNC: 6 UNITS (ref 0–19)
ALBUMIN SERPL ELPH-MCNC: 3 G/DL (ref 2.9–4.4)
ALBUMIN SERPL-MCNC: 3.5 G/DL (ref 3.5–5.2)
ALBUMIN/GLOB SERPL: 1.5 {RATIO} (ref 0.7–1.7)
ALBUMIN/GLOB SERPL: 2.5 G/DL
ALP SERPL-CCNC: 49 U/L (ref 39–117)
ALPHA1 GLOB SERPL ELPH-MCNC: 0.1 G/DL (ref 0–0.4)
ALPHA2 GLOB SERPL ELPH-MCNC: 0.2 G/DL (ref 0.4–1)
ALT SERPL W P-5'-P-CCNC: 29 U/L (ref 1–33)
ANA SER QL: NEGATIVE
ANION GAP SERPL CALCULATED.3IONS-SCNC: 10 MMOL/L (ref 5–15)
ARTERIAL PATENCY WRIST A: NEGATIVE
AST SERPL-CCNC: 50 U/L (ref 1–32)
ATMOSPHERIC PRESS: ABNORMAL MM[HG]
B-GLOBULIN SERPL ELPH-MCNC: 0.4 G/DL (ref 0.7–1.3)
BASE EXCESS BLDA CALC-SCNC: -3.2 MMOL/L (ref 0–3)
BASOPHILS # BLD AUTO: 0.1 10*3/MM3 (ref 0–0.2)
BASOPHILS NFR BLD AUTO: 0.7 % (ref 0–1.5)
BDY SITE: ABNORMAL
BH BB BLOOD EXPIRATION DATE: NORMAL
BH BB BLOOD TYPE BARCODE: 5100
BH BB BLOOD TYPE BARCODE: 5100
BH BB BLOOD TYPE BARCODE: 9500
BH BB DISPENSE STATUS: NORMAL
BH BB PRODUCT CODE: NORMAL
BH BB UNIT NUMBER: NORMAL
BH CV ECHO MEAS - ACS: 1.4 CM
BH CV ECHO MEAS - AO MAX PG (FULL): 1.5 MMHG
BH CV ECHO MEAS - AO MAX PG: 13.8 MMHG
BH CV ECHO MEAS - AO MEAN PG (FULL): 0.16 MMHG
BH CV ECHO MEAS - AO MEAN PG: 6.7 MMHG
BH CV ECHO MEAS - AO ROOT AREA (BSA CORRECTED): 1.5
BH CV ECHO MEAS - AO ROOT AREA: 5.5 CM^2
BH CV ECHO MEAS - AO ROOT DIAM: 2.6 CM
BH CV ECHO MEAS - AO V2 MAX: 185.6 CM/SEC
BH CV ECHO MEAS - AO V2 MEAN: 117.4 CM/SEC
BH CV ECHO MEAS - AO V2 VTI: 34.4 CM
BH CV ECHO MEAS - AORTIC HR: 94.4 BPM
BH CV ECHO MEAS - AORTIC R-R: 0.64 SEC
BH CV ECHO MEAS - ASC AORTA: 2.3 CM
BH CV ECHO MEAS - AVA(I,A): 2.4 CM^2
BH CV ECHO MEAS - AVA(I,D): 2.4 CM^2
BH CV ECHO MEAS - AVA(V,A): 2.5 CM^2
BH CV ECHO MEAS - AVA(V,D): 2.5 CM^2
BH CV ECHO MEAS - BSA(HAYCOCK): 1.9 M^2
BH CV ECHO MEAS - BSA: 1.8 M^2
BH CV ECHO MEAS - BZI_BMI: 29.6 KILOGRAMS/M^2
BH CV ECHO MEAS - BZI_METRIC_HEIGHT: 160 CM
BH CV ECHO MEAS - BZI_METRIC_WEIGHT: 75.7 KG
BH CV ECHO MEAS - CI(AO): 10 L/MIN/M^2
BH CV ECHO MEAS - CI(LVOT): 4.4 L/MIN/M^2
BH CV ECHO MEAS - CO(AO): 17.9 L/MIN
BH CV ECHO MEAS - CO(LVOT): 7.9 L/MIN
BH CV ECHO MEAS - EDV(CUBED): 111.4 ML
BH CV ECHO MEAS - EDV(MOD-SP4): 57.2 ML
BH CV ECHO MEAS - EDV(TEICH): 108.1 ML
BH CV ECHO MEAS - EF(CUBED): 91.3 %
BH CV ECHO MEAS - EF(MOD-BP): 58 %
BH CV ECHO MEAS - EF(MOD-SP4): 58 %
BH CV ECHO MEAS - EF(TEICH): 86.1 %
BH CV ECHO MEAS - ESV(CUBED): 9.7 ML
BH CV ECHO MEAS - ESV(MOD-SP4): 24 ML
BH CV ECHO MEAS - ESV(TEICH): 15 ML
BH CV ECHO MEAS - FS: 55.7 %
BH CV ECHO MEAS - IVS/LVPW: 0.74
BH CV ECHO MEAS - IVSD: 0.56 CM
BH CV ECHO MEAS - LA DIMENSION(2D): 3.1 CM
BH CV ECHO MEAS - LV DIASTOLIC VOL/BSA (35-75): 31.9 ML/M^2
BH CV ECHO MEAS - LV MASS(C)D: 99.5 GRAMS
BH CV ECHO MEAS - LV MASS(C)DI: 55.5 GRAMS/M^2
BH CV ECHO MEAS - LV MAX PG: 12.3 MMHG
BH CV ECHO MEAS - LV MEAN PG: 6.5 MMHG
BH CV ECHO MEAS - LV SYSTOLIC VOL/BSA (12-30): 13.4 ML/M^2
BH CV ECHO MEAS - LV V1 MAX: 175.2 CM/SEC
BH CV ECHO MEAS - LV V1 MEAN: 117.8 CM/SEC
BH CV ECHO MEAS - LV V1 VTI: 31.8 CM
BH CV ECHO MEAS - LVIDD: 4.8 CM
BH CV ECHO MEAS - LVIDS: 2.1 CM
BH CV ECHO MEAS - LVOT AREA: 2.6 CM^2
BH CV ECHO MEAS - LVOT DIAM: 1.8 CM
BH CV ECHO MEAS - LVPWD: 0.76 CM
BH CV ECHO MEAS - MV A MAX VEL: 106.4 CM/SEC
BH CV ECHO MEAS - MV DEC SLOPE: 634.6 CM/SEC^2
BH CV ECHO MEAS - MV DEC TIME: 0.23 SEC
BH CV ECHO MEAS - MV E MAX VEL: 143.9 CM/SEC
BH CV ECHO MEAS - MV E/A: 1.4
BH CV ECHO MEAS - MV MAX PG: 8.4 MMHG
BH CV ECHO MEAS - MV MEAN PG: 4.4 MMHG
BH CV ECHO MEAS - MV V2 MAX: 144.7 CM/SEC
BH CV ECHO MEAS - MV V2 MEAN: 100.4 CM/SEC
BH CV ECHO MEAS - MV V2 VTI: 30.9 CM
BH CV ECHO MEAS - MVA(VTI): 2.7 CM^2
BH CV ECHO MEAS - PA ACC TIME: 0.06 SEC
BH CV ECHO MEAS - PA MAX PG (FULL): 2.5 MMHG
BH CV ECHO MEAS - PA MAX PG: 8.3 MMHG
BH CV ECHO MEAS - PA MEAN PG (FULL): 1.4 MMHG
BH CV ECHO MEAS - PA MEAN PG: 4.9 MMHG
BH CV ECHO MEAS - PA PR(ACCEL): 49.9 MMHG
BH CV ECHO MEAS - PA V2 MAX: 143.9 CM/SEC
BH CV ECHO MEAS - PA V2 MEAN: 105.7 CM/SEC
BH CV ECHO MEAS - PA V2 VTI: 31.4 CM
BH CV ECHO MEAS - PULM A REVS DUR: 0.1 SEC
BH CV ECHO MEAS - PULM A REVS VEL: 41.6 CM/SEC
BH CV ECHO MEAS - PULM DIAS VEL: 62.7 CM/SEC
BH CV ECHO MEAS - PULM S/D: 1.5
BH CV ECHO MEAS - PULM SYS VEL: 92.2 CM/SEC
BH CV ECHO MEAS - PVA(I,A): 2.2 CM^2
BH CV ECHO MEAS - PVA(I,D): 2.2 CM^2
BH CV ECHO MEAS - PVA(V,A): 2.3 CM^2
BH CV ECHO MEAS - PVA(V,D): 2.3 CM^2
BH CV ECHO MEAS - QP/QS: 0.8
BH CV ECHO MEAS - RAP SYSTOLE: 3 MMHG
BH CV ECHO MEAS - RV MAX PG: 5.8 MMHG
BH CV ECHO MEAS - RV MEAN PG: 3.5 MMHG
BH CV ECHO MEAS - RV V1 MAX: 120 CM/SEC
BH CV ECHO MEAS - RV V1 MEAN: 87.1 CM/SEC
BH CV ECHO MEAS - RV V1 VTI: 24.9 CM
BH CV ECHO MEAS - RVDD: 2.4 CM
BH CV ECHO MEAS - RVOT AREA: 2.7 CM^2
BH CV ECHO MEAS - RVOT DIAM: 1.9 CM
BH CV ECHO MEAS - RVSP: 42.1 MMHG
BH CV ECHO MEAS - SI(AO): 105.8 ML/M^2
BH CV ECHO MEAS - SI(CUBED): 56.8 ML/M^2
BH CV ECHO MEAS - SI(LVOT): 47 ML/M^2
BH CV ECHO MEAS - SI(MOD-SP4): 18.5 ML/M^2
BH CV ECHO MEAS - SI(TEICH): 52 ML/M^2
BH CV ECHO MEAS - SV(AO): 189.5 ML
BH CV ECHO MEAS - SV(CUBED): 101.7 ML
BH CV ECHO MEAS - SV(LVOT): 84.2 ML
BH CV ECHO MEAS - SV(MOD-SP4): 33.2 ML
BH CV ECHO MEAS - SV(RVOT): 67.7 ML
BH CV ECHO MEAS - SV(TEICH): 93.1 ML
BH CV ECHO MEAS - TR MAX VEL: 312.7 CM/SEC
BILIRUB SERPL-MCNC: 5.6 MG/DL (ref 0–1.2)
BUN SERPL-MCNC: 33 MG/DL (ref 6–20)
BUN/CREAT SERPL: 20.2 (ref 7–25)
CA-I SERPL ISE-MCNC: 1.1 MMOL/L (ref 1.2–1.3)
CALCIUM SPEC-SCNC: 7.7 MG/DL (ref 8.6–10.5)
CHLORIDE SERPL-SCNC: 107 MMOL/L (ref 98–107)
CO2 BLDA-SCNC: 22 MMOL/L (ref 22–29)
CO2 SERPL-SCNC: 21 MMOL/L (ref 22–29)
CREAT SERPL-MCNC: 1.63 MG/DL (ref 0.57–1)
CROSSMATCH INTERPRETATION: NORMAL
D-LACTATE SERPL-SCNC: 1 MMOL/L (ref 0.5–2)
DEPRECATED RDW RBC AUTO: 56.9 FL (ref 37–54)
EOSINOPHIL # BLD AUTO: 0.1 10*3/MM3 (ref 0–0.4)
EOSINOPHIL NFR BLD AUTO: 1.1 % (ref 0.3–6.2)
ERYTHROCYTE [DISTWIDTH] IN BLOOD BY AUTOMATED COUNT: 17.9 % (ref 12.3–15.4)
GAMMA GLOB SERPL ELPH-MCNC: 1.2 G/DL (ref 0.4–1.8)
GFR SERPL CREATININE-BSD FRML MDRD: 36 ML/MIN/1.73
GLOBULIN SER CALC-MCNC: 2 G/DL (ref 2.2–3.9)
GLOBULIN UR ELPH-MCNC: 1.4 GM/DL
GLUCOSE BLDC GLUCOMTR-MCNC: 146 MG/DL (ref 70–105)
GLUCOSE SERPL-MCNC: 108 MG/DL (ref 65–99)
HCO3 BLDA-SCNC: 21 MMOL/L (ref 21–28)
HCT VFR BLD AUTO: 24.5 % (ref 34–46.6)
HCT VFR BLD AUTO: 26.1 % (ref 34–46.6)
HCV RNA SERPL NAA+PROBE-ACNC: NORMAL IU/ML
HCV RNA SERPL NAA+PROBE-LOG IU: 5.53 LOG10 IU/ML
HEMODILUTION: NO
HGB BLD-MCNC: 8.3 G/DL (ref 12–15.9)
HGB BLD-MCNC: 8.9 G/DL (ref 12–15.9)
INHALED O2 CONCENTRATION: 40 %
INR PPP: 1.95 (ref 0.93–1.1)
LABORATORY COMMENT REPORT: ABNORMAL
LKM-1 AB SER-ACNC: 2.9 UNITS (ref 0–20)
LYMPHOCYTES # BLD AUTO: 2 10*3/MM3 (ref 0.7–3.1)
LYMPHOCYTES NFR BLD AUTO: 25.4 % (ref 19.6–45.3)
M PROTEIN SERPL ELPH-MCNC: ABNORMAL G/DL
MAGNESIUM SERPL-MCNC: 2 MG/DL (ref 1.6–2.6)
MCH RBC QN AUTO: 30.9 PG (ref 26.6–33)
MCHC RBC AUTO-ENTMCNC: 33.7 G/DL (ref 31.5–35.7)
MCV RBC AUTO: 91.7 FL (ref 79–97)
MITOCHONDRIA M2 IGG SER-ACNC: <20 UNITS (ref 0–20)
MODALITY: ABNORMAL
MONOCYTES # BLD AUTO: 0.6 10*3/MM3 (ref 0.1–0.9)
MONOCYTES NFR BLD AUTO: 7.1 % (ref 5–12)
NEUTROPHILS NFR BLD AUTO: 5.1 10*3/MM3 (ref 1.7–7)
NEUTROPHILS NFR BLD AUTO: 65.7 % (ref 42.7–76)
NRBC BLD AUTO-RTO: 0.1 /100 WBC (ref 0–0.2)
PCO2 BLDA: 32.6 MM HG (ref 35–48)
PH BLDA: 7.42 PH UNITS (ref 7.35–7.45)
PHOSPHATE SERPL-MCNC: 3 MG/DL (ref 2.5–4.5)
PLATELET # BLD AUTO: 64 10*3/MM3 (ref 140–450)
PMV BLD AUTO: 8 FL (ref 6–12)
PO2 BLDA: 57.5 MM HG (ref 83–108)
POTASSIUM SERPL-SCNC: 3.2 MMOL/L (ref 3.5–5.2)
PROT PATTERN SERPL ELPH-IMP: ABNORMAL
PROT SERPL-MCNC: 4.9 G/DL (ref 6–8.5)
PROT SERPL-MCNC: 5 G/DL (ref 6–8.5)
PROTHROMBIN TIME: 20.6 SECONDS (ref 9.6–11.7)
QT INTERVAL: 362 MS
RBC # BLD AUTO: 2.67 10*6/MM3 (ref 3.77–5.28)
SAO2 % BLDCOA: 90.2 % (ref 94–98)
SODIUM SERPL-SCNC: 138 MMOL/L (ref 136–145)
TEST INFORMATION: NORMAL
TROPONIN T SERPL-MCNC: <0.01 NG/ML (ref 0–0.03)
UNIT  ABO: NORMAL
UNIT  RH: NORMAL
WBC # BLD AUTO: 7.8 10*3/MM3 (ref 3.4–10.8)

## 2021-08-11 PROCEDURE — 97162 PT EVAL MOD COMPLEX 30 MIN: CPT

## 2021-08-11 PROCEDURE — 25010000002 CEFTRIAXONE PER 250 MG: Performed by: NURSE PRACTITIONER

## 2021-08-11 PROCEDURE — P9047 ALBUMIN (HUMAN), 25%, 50ML: HCPCS | Performed by: INTERNAL MEDICINE

## 2021-08-11 PROCEDURE — 25010000002 MORPHINE PER 10 MG: Performed by: STUDENT IN AN ORGANIZED HEALTH CARE EDUCATION/TRAINING PROGRAM

## 2021-08-11 PROCEDURE — 82803 BLOOD GASES ANY COMBINATION: CPT

## 2021-08-11 PROCEDURE — 25010000002 MORPHINE PER 10 MG: Performed by: INTERNAL MEDICINE

## 2021-08-11 PROCEDURE — 76775 US EXAM ABDO BACK WALL LIM: CPT

## 2021-08-11 PROCEDURE — 25010000002 ALBUMIN HUMAN 25% PER 50 ML: Performed by: INTERNAL MEDICINE

## 2021-08-11 PROCEDURE — 71045 X-RAY EXAM CHEST 1 VIEW: CPT

## 2021-08-11 PROCEDURE — 63710000001 PREDNISONE PER 1 MG: Performed by: NURSE PRACTITIONER

## 2021-08-11 PROCEDURE — 99233 SBSQ HOSP IP/OBS HIGH 50: CPT | Performed by: NURSE PRACTITIONER

## 2021-08-11 PROCEDURE — 85014 HEMATOCRIT: CPT | Performed by: INTERNAL MEDICINE

## 2021-08-11 PROCEDURE — 36600 WITHDRAWAL OF ARTERIAL BLOOD: CPT

## 2021-08-11 PROCEDURE — 25010000002 OCTREOTIDE PER 25 MCG: Performed by: EMERGENCY MEDICINE

## 2021-08-11 PROCEDURE — 85610 PROTHROMBIN TIME: CPT | Performed by: INTERNAL MEDICINE

## 2021-08-11 PROCEDURE — 80053 COMPREHEN METABOLIC PANEL: CPT | Performed by: NURSE PRACTITIONER

## 2021-08-11 PROCEDURE — 82962 GLUCOSE BLOOD TEST: CPT

## 2021-08-11 PROCEDURE — 83605 ASSAY OF LACTIC ACID: CPT | Performed by: NURSE PRACTITIONER

## 2021-08-11 PROCEDURE — 85025 COMPLETE CBC W/AUTO DIFF WBC: CPT | Performed by: NURSE PRACTITIONER

## 2021-08-11 PROCEDURE — 85018 HEMOGLOBIN: CPT | Performed by: INTERNAL MEDICINE

## 2021-08-11 PROCEDURE — 97116 GAIT TRAINING THERAPY: CPT

## 2021-08-11 PROCEDURE — 82330 ASSAY OF CALCIUM: CPT | Performed by: INTERNAL MEDICINE

## 2021-08-11 PROCEDURE — 25010000002 CALCIUM GLUCONATE 2-0.675 GM/100ML-% SOLUTION: Performed by: INTERNAL MEDICINE

## 2021-08-11 PROCEDURE — 83735 ASSAY OF MAGNESIUM: CPT | Performed by: NURSE PRACTITIONER

## 2021-08-11 PROCEDURE — 84100 ASSAY OF PHOSPHORUS: CPT | Performed by: NURSE PRACTITIONER

## 2021-08-11 PROCEDURE — 93005 ELECTROCARDIOGRAM TRACING: CPT | Performed by: NURSE PRACTITIONER

## 2021-08-11 PROCEDURE — 25010000002 MORPHINE PER 10 MG: Performed by: NURSE PRACTITIONER

## 2021-08-11 PROCEDURE — 84484 ASSAY OF TROPONIN QUANT: CPT | Performed by: NURSE PRACTITIONER

## 2021-08-11 PROCEDURE — 93010 ELECTROCARDIOGRAM REPORT: CPT | Performed by: INTERNAL MEDICINE

## 2021-08-11 RX ORDER — CALCIUM GLUCONATE 20 MG/ML
2 INJECTION, SOLUTION INTRAVENOUS EVERY 12 HOURS
Status: COMPLETED | OUTPATIENT
Start: 2021-08-11 | End: 2021-08-11

## 2021-08-11 RX ORDER — POTASSIUM CHLORIDE 1.5 G/1.77G
40 POWDER, FOR SOLUTION ORAL ONCE
Status: COMPLETED | OUTPATIENT
Start: 2021-08-11 | End: 2021-08-11

## 2021-08-11 RX ORDER — MORPHINE SULFATE 4 MG/ML
2 INJECTION, SOLUTION INTRAMUSCULAR; INTRAVENOUS ONCE
Status: COMPLETED | OUTPATIENT
Start: 2021-08-11 | End: 2021-08-11

## 2021-08-11 RX ORDER — MORPHINE SULFATE 4 MG/ML
2 INJECTION, SOLUTION INTRAMUSCULAR; INTRAVENOUS EVERY 4 HOURS PRN
Status: COMPLETED | OUTPATIENT
Start: 2021-08-11 | End: 2021-08-12

## 2021-08-11 RX ORDER — FUROSEMIDE 20 MG/1
20 TABLET ORAL DAILY
Status: DISCONTINUED | OUTPATIENT
Start: 2021-08-11 | End: 2021-08-13

## 2021-08-11 RX ADMIN — CALCIUM GLUCONATE 2 G: 20 INJECTION, SOLUTION INTRAVENOUS at 09:43

## 2021-08-11 RX ADMIN — MORPHINE SULFATE 2 MG: 4 INJECTION INTRAVENOUS at 03:35

## 2021-08-11 RX ADMIN — MORPHINE SULFATE 2 MG: 4 INJECTION INTRAVENOUS at 22:14

## 2021-08-11 RX ADMIN — SUCRALFATE 1 G: 1 TABLET ORAL at 09:43

## 2021-08-11 RX ADMIN — Medication 100 MG: at 09:43

## 2021-08-11 RX ADMIN — PANTOPRAZOLE SODIUM 40 MG: 40 INJECTION, POWDER, FOR SOLUTION INTRAVENOUS at 20:41

## 2021-08-11 RX ADMIN — CALCIUM GLUCONATE 2 G: 20 INJECTION, SOLUTION INTRAVENOUS at 20:41

## 2021-08-11 RX ADMIN — Medication 10 ML: at 09:44

## 2021-08-11 RX ADMIN — FOLIC ACID 1 MG: 1 TABLET ORAL at 09:43

## 2021-08-11 RX ADMIN — OCTREOTIDE ACETATE 25 MCG/HR: 500 INJECTION, SOLUTION INTRAVENOUS; SUBCUTANEOUS at 02:36

## 2021-08-11 RX ADMIN — FUROSEMIDE 20 MG: 20 TABLET ORAL at 09:44

## 2021-08-11 RX ADMIN — PANTOPRAZOLE SODIUM 40 MG: 40 INJECTION, POWDER, FOR SOLUTION INTRAVENOUS at 09:43

## 2021-08-11 RX ADMIN — MORPHINE SULFATE 2 MG: 4 INJECTION INTRAVENOUS at 10:03

## 2021-08-11 RX ADMIN — PREDNISONE 40 MG: 20 TABLET ORAL at 09:43

## 2021-08-11 RX ADMIN — SUCRALFATE 1 G: 1 TABLET ORAL at 17:48

## 2021-08-11 RX ADMIN — WATER 1 G: 100 INJECTION, SOLUTION INTRAVENOUS at 20:41

## 2021-08-11 RX ADMIN — POTASSIUM CHLORIDE 40 MEQ: 1.5 POWDER, FOR SOLUTION ORAL at 05:22

## 2021-08-11 RX ADMIN — ALBUMIN HUMAN 25 G: 0.25 SOLUTION INTRAVENOUS at 01:14

## 2021-08-11 RX ADMIN — SUCRALFATE 1 G: 1 TABLET ORAL at 12:24

## 2021-08-11 RX ADMIN — ZINC SULFATE 220 MG (50 MG) CAPSULE 220 MG: CAPSULE at 09:43

## 2021-08-11 RX ADMIN — SUCRALFATE 1 G: 1 TABLET ORAL at 20:41

## 2021-08-11 RX ADMIN — Medication 0.1 MCG/KG/MIN: at 05:22

## 2021-08-11 RX ADMIN — Medication 10 ML: at 20:40

## 2021-08-11 NOTE — ANESTHESIA POSTPROCEDURE EVALUATION
Patient: Alondra WEBER Tevis    Procedure Summary     Date: 08/10/21 Room / Location: [facility] ENDO VIRTUAL  / [facility] ENDOSCOPY    Anesthesia Start: 1458 Anesthesia Stop: 1517    Procedure: ESOPHAGOGASTRODUODENOSCOPY AT BEDSIDE with banding x4 (N/A ) Diagnosis:       Hematemesis with nausea      (Hematemesis with nausea [K92.0])    Surgeons: Clark Ochoa MD Provider: Tree Holland MD    Anesthesia Type: MAC ASA Status: 4 - Emergent          Anesthesia Type: MAC    Vitals  Vitals Value Taken Time   /61 08/11/21 0831   Temp 98.78 °F (37.1 °C) 08/11/21 0841   Pulse 98 08/11/21 0841   Resp 20 08/10/21 2135   SpO2 94 % 08/11/21 0841   Vitals shown include unvalidated device data.        Post Anesthesia Care and Evaluation    Patient location during evaluation: bedside  Patient participation: complete - patient participated  Level of consciousness: awake and alert  Pain score: 1  Pain management: adequate  Airway patency: patent  Anesthetic complications: No anesthetic complications  PONV Status: none  Cardiovascular status: acceptable  Respiratory status: acceptable  Hydration status: acceptable  Post Neuraxial Block status: Motor and sensory function returned to baseline

## 2021-08-12 ENCOUNTER — INPATIENT HOSPITAL (OUTPATIENT)
Dept: URBAN - METROPOLITAN AREA HOSPITAL 84 | Facility: HOSPITAL | Age: 36
End: 2021-08-12
Payer: COMMERCIAL

## 2021-08-12 DIAGNOSIS — K92.0 HEMATEMESIS: ICD-10-CM

## 2021-08-12 DIAGNOSIS — A41.9 SEPSIS, UNSPECIFIED ORGANISM: ICD-10-CM

## 2021-08-12 DIAGNOSIS — I85.00 ESOPHAGEAL VARICES WITHOUT BLEEDING: ICD-10-CM

## 2021-08-12 DIAGNOSIS — R74.8 ABNORMAL LEVELS OF OTHER SERUM ENZYMES: ICD-10-CM

## 2021-08-12 DIAGNOSIS — F10.11 ALCOHOL ABUSE, IN REMISSION: ICD-10-CM

## 2021-08-12 DIAGNOSIS — B18.2 CHRONIC VIRAL HEPATITIS C: ICD-10-CM

## 2021-08-12 DIAGNOSIS — D50.0 IRON DEFICIENCY ANEMIA SECONDARY TO BLOOD LOSS (CHRONIC): ICD-10-CM

## 2021-08-12 DIAGNOSIS — N17.9 ACUTE KIDNEY FAILURE, UNSPECIFIED: ICD-10-CM

## 2021-08-12 DIAGNOSIS — K70.30 ALCOHOLIC CIRRHOSIS OF LIVER WITHOUT ASCITES: ICD-10-CM

## 2021-08-12 LAB
ALBUMIN SERPL-MCNC: 3.4 G/DL (ref 3.5–5.2)
ALBUMIN/GLOB SERPL: 2 G/DL
ALP SERPL-CCNC: 59 U/L (ref 39–117)
ALT SERPL W P-5'-P-CCNC: 32 U/L (ref 1–33)
ANION GAP SERPL CALCULATED.3IONS-SCNC: 7 MMOL/L (ref 5–15)
AST SERPL-CCNC: 41 U/L (ref 1–32)
BASOPHILS # BLD AUTO: 0 10*3/MM3 (ref 0–0.2)
BASOPHILS NFR BLD AUTO: 0.5 % (ref 0–1.5)
BH BB BLOOD EXPIRATION DATE: NORMAL
BH BB BLOOD TYPE BARCODE: 5100
BH BB DISPENSE STATUS: NORMAL
BH BB PRODUCT CODE: NORMAL
BH BB UNIT NUMBER: NORMAL
BILIRUB SERPL-MCNC: 4.8 MG/DL (ref 0–1.2)
BUN SERPL-MCNC: 21 MG/DL (ref 6–20)
BUN/CREAT SERPL: 22.3 (ref 7–25)
CA-I SERPL ISE-MCNC: 1.28 MMOL/L (ref 1.2–1.3)
CALCIUM SPEC-SCNC: 8.8 MG/DL (ref 8.6–10.5)
CHLORIDE SERPL-SCNC: 106 MMOL/L (ref 98–107)
CO2 SERPL-SCNC: 26 MMOL/L (ref 22–29)
CREAT SERPL-MCNC: 0.94 MG/DL (ref 0.57–1)
CROSSMATCH INTERPRETATION: NORMAL
DEPRECATED RDW RBC AUTO: 57.8 FL (ref 37–54)
EOSINOPHIL # BLD AUTO: 0 10*3/MM3 (ref 0–0.4)
EOSINOPHIL NFR BLD AUTO: 0 % (ref 0.3–6.2)
ERYTHROCYTE [DISTWIDTH] IN BLOOD BY AUTOMATED COUNT: 18.4 % (ref 12.3–15.4)
GFR SERPL CREATININE-BSD FRML MDRD: 68 ML/MIN/1.73
GLOBULIN UR ELPH-MCNC: 1.7 GM/DL
GLUCOSE SERPL-MCNC: 130 MG/DL (ref 65–99)
HCT VFR BLD AUTO: 25.5 % (ref 34–46.6)
HGB BLD-MCNC: 9.1 G/DL (ref 12–15.9)
LYMPHOCYTES # BLD AUTO: 1.4 10*3/MM3 (ref 0.7–3.1)
LYMPHOCYTES NFR BLD AUTO: 15 % (ref 19.6–45.3)
MAGNESIUM SERPL-MCNC: 1.9 MG/DL (ref 1.6–2.6)
MCH RBC QN AUTO: 33.1 PG (ref 26.6–33)
MCHC RBC AUTO-ENTMCNC: 35.7 G/DL (ref 31.5–35.7)
MCV RBC AUTO: 92.7 FL (ref 79–97)
MONOCYTES # BLD AUTO: 0.6 10*3/MM3 (ref 0.1–0.9)
MONOCYTES NFR BLD AUTO: 6.7 % (ref 5–12)
NEUTROPHILS NFR BLD AUTO: 7.1 10*3/MM3 (ref 1.7–7)
NEUTROPHILS NFR BLD AUTO: 77.8 % (ref 42.7–76)
NRBC BLD AUTO-RTO: 0 /100 WBC (ref 0–0.2)
PHOSPHATE SERPL-MCNC: 2.4 MG/DL (ref 2.5–4.5)
PLATELET # BLD AUTO: 57 10*3/MM3 (ref 140–450)
PMV BLD AUTO: 8.1 FL (ref 6–12)
POTASSIUM SERPL-SCNC: 3.8 MMOL/L (ref 3.5–5.2)
PROT SERPL-MCNC: 5.1 G/DL (ref 6–8.5)
RBC # BLD AUTO: 2.75 10*6/MM3 (ref 3.77–5.28)
SODIUM SERPL-SCNC: 139 MMOL/L (ref 136–145)
UNIT  ABO: NORMAL
UNIT  RH: NORMAL
WBC # BLD AUTO: 9.1 10*3/MM3 (ref 3.4–10.8)

## 2021-08-12 PROCEDURE — 97166 OT EVAL MOD COMPLEX 45 MIN: CPT

## 2021-08-12 PROCEDURE — 25010000002 MORPHINE PER 10 MG: Performed by: STUDENT IN AN ORGANIZED HEALTH CARE EDUCATION/TRAINING PROGRAM

## 2021-08-12 PROCEDURE — 80053 COMPREHEN METABOLIC PANEL: CPT | Performed by: NURSE PRACTITIONER

## 2021-08-12 PROCEDURE — 97110 THERAPEUTIC EXERCISES: CPT

## 2021-08-12 PROCEDURE — 97116 GAIT TRAINING THERAPY: CPT

## 2021-08-12 PROCEDURE — 99233 SBSQ HOSP IP/OBS HIGH 50: CPT | Performed by: NURSE PRACTITIONER

## 2021-08-12 PROCEDURE — 25010000002 CEFTRIAXONE PER 250 MG: Performed by: INTERNAL MEDICINE

## 2021-08-12 PROCEDURE — 83735 ASSAY OF MAGNESIUM: CPT | Performed by: NURSE PRACTITIONER

## 2021-08-12 PROCEDURE — 82330 ASSAY OF CALCIUM: CPT | Performed by: INTERNAL MEDICINE

## 2021-08-12 PROCEDURE — 85025 COMPLETE CBC W/AUTO DIFF WBC: CPT | Performed by: NURSE PRACTITIONER

## 2021-08-12 PROCEDURE — 63710000001 PREDNISONE PER 1 MG: Performed by: NURSE PRACTITIONER

## 2021-08-12 PROCEDURE — 84100 ASSAY OF PHOSPHORUS: CPT | Performed by: NURSE PRACTITIONER

## 2021-08-12 RX ORDER — DIPHENHYDRAMINE HYDROCHLORIDE AND LIDOCAINE HYDROCHLORIDE AND ALUMINUM HYDROXIDE AND MAGNESIUM HYDRO
5 KIT
Status: DISCONTINUED | OUTPATIENT
Start: 2021-08-12 | End: 2021-08-15 | Stop reason: HOSPADM

## 2021-08-12 RX ORDER — DIPHENHYDRAMINE HYDROCHLORIDE AND LIDOCAINE HYDROCHLORIDE AND ALUMINUM HYDROXIDE AND MAGNESIUM HYDRO
5 KIT 4 TIMES DAILY
Status: DISCONTINUED | OUTPATIENT
Start: 2021-08-12 | End: 2021-08-12

## 2021-08-12 RX ORDER — FENTANYL/ROPIVACAINE/NS/PF 2-625MCG/1
15 PLASTIC BAG, INJECTION (ML) EPIDURAL ONCE
Status: COMPLETED | OUTPATIENT
Start: 2021-08-12 | End: 2021-08-12

## 2021-08-12 RX ORDER — PREDNISONE 20 MG/1
20 TABLET ORAL
Status: DISCONTINUED | OUTPATIENT
Start: 2021-08-13 | End: 2021-08-15 | Stop reason: HOSPADM

## 2021-08-12 RX ORDER — MIDODRINE HYDROCHLORIDE 5 MG/1
5 TABLET ORAL
Status: DISCONTINUED | OUTPATIENT
Start: 2021-08-12 | End: 2021-08-13

## 2021-08-12 RX ADMIN — ZINC SULFATE 220 MG (50 MG) CAPSULE 220 MG: CAPSULE at 09:06

## 2021-08-12 RX ADMIN — FUROSEMIDE 20 MG: 20 TABLET ORAL at 06:11

## 2021-08-12 RX ADMIN — Medication 10 ML: at 20:51

## 2021-08-12 RX ADMIN — POTASSIUM PHOSPHATE, MONOBASIC AND POTASSIUM PHOSPHATE, DIBASIC 15 MMOL: 224; 236 INJECTION, SOLUTION, CONCENTRATE INTRAVENOUS at 09:07

## 2021-08-12 RX ADMIN — SUCRALFATE 1 G: 1 TABLET ORAL at 09:07

## 2021-08-12 RX ADMIN — FOLIC ACID 1 MG: 1 TABLET ORAL at 09:06

## 2021-08-12 RX ADMIN — PREDNISONE 40 MG: 20 TABLET ORAL at 09:06

## 2021-08-12 RX ADMIN — MIDODRINE HYDROCHLORIDE 5 MG: 5 TABLET ORAL at 17:13

## 2021-08-12 RX ADMIN — PANTOPRAZOLE SODIUM 40 MG: 40 INJECTION, POWDER, FOR SOLUTION INTRAVENOUS at 09:07

## 2021-08-12 RX ADMIN — SUCRALFATE 1 G: 1 TABLET ORAL at 17:13

## 2021-08-12 RX ADMIN — DIPHENHYDRAMINE HYDROCHLORIDE AND LIDOCAINE HYDROCHLORIDE AND ALUMINUM HYDROXIDE AND MAGNESIUM HYDRO 5 ML: KIT at 20:50

## 2021-08-12 RX ADMIN — MORPHINE SULFATE 2 MG: 4 INJECTION INTRAVENOUS at 23:14

## 2021-08-12 RX ADMIN — WATER 1 G: 100 INJECTION, SOLUTION INTRAVENOUS at 20:51

## 2021-08-12 RX ADMIN — DIPHENHYDRAMINE HYDROCHLORIDE AND LIDOCAINE HYDROCHLORIDE AND ALUMINUM HYDROXIDE AND MAGNESIUM HYDRO 5 ML: KIT at 17:47

## 2021-08-12 RX ADMIN — Medication 10 ML: at 09:08

## 2021-08-12 RX ADMIN — MIDODRINE HYDROCHLORIDE 5 MG: 5 TABLET ORAL at 11:35

## 2021-08-12 RX ADMIN — DIPHENHYDRAMINE HYDROCHLORIDE AND LIDOCAINE HYDROCHLORIDE AND ALUMINUM HYDROXIDE AND MAGNESIUM HYDRO 5 ML: KIT at 11:35

## 2021-08-12 RX ADMIN — SUCRALFATE 1 G: 1 TABLET ORAL at 11:35

## 2021-08-12 RX ADMIN — Medication 100 MG: at 09:07

## 2021-08-12 RX ADMIN — PANTOPRAZOLE SODIUM 40 MG: 40 INJECTION, POWDER, FOR SOLUTION INTRAVENOUS at 20:50

## 2021-08-12 RX ADMIN — SUCRALFATE 1 G: 1 TABLET ORAL at 20:50

## 2021-08-13 ENCOUNTER — INPATIENT HOSPITAL (OUTPATIENT)
Dept: URBAN - METROPOLITAN AREA HOSPITAL 84 | Facility: HOSPITAL | Age: 36
End: 2021-08-13
Payer: COMMERCIAL

## 2021-08-13 DIAGNOSIS — K70.10 ALCOHOLIC HEPATITIS WITHOUT ASCITES: ICD-10-CM

## 2021-08-13 DIAGNOSIS — K92.0 HEMATEMESIS: ICD-10-CM

## 2021-08-13 DIAGNOSIS — F10.10 ALCOHOL ABUSE, UNCOMPLICATED: ICD-10-CM

## 2021-08-13 DIAGNOSIS — B18.2 CHRONIC VIRAL HEPATITIS C: ICD-10-CM

## 2021-08-13 DIAGNOSIS — D53.9 NUTRITIONAL ANEMIA, UNSPECIFIED: ICD-10-CM

## 2021-08-13 DIAGNOSIS — R94.5 ABNORMAL RESULTS OF LIVER FUNCTION STUDIES: ICD-10-CM

## 2021-08-13 PROBLEM — O24.419 GESTATIONAL DIABETES MELLITUS (GDM) AFFECTING PREGNANCY, ANTEPARTUM: Status: ACTIVE | Noted: 2017-03-16

## 2021-08-13 PROBLEM — O98.419 PREGNANCY COMPLICATED BY CHRONIC HEPATITIS C, ANTEPARTUM (HCC): Status: ACTIVE | Noted: 2017-03-16

## 2021-08-13 PROBLEM — F19.91 HISTORY OF DRUG USE: Status: ACTIVE | Noted: 2017-03-16

## 2021-08-13 PROBLEM — O99.332 TOBACCO SMOKING AFFECTING PREGNANCY IN SECOND TRIMESTER: Status: ACTIVE | Noted: 2017-03-16

## 2021-08-13 LAB
ALBUMIN SERPL-MCNC: 3.3 G/DL (ref 3.5–5.2)
ALBUMIN/GLOB SERPL: 1.6 G/DL
ALP SERPL-CCNC: 61 U/L (ref 39–117)
ALT SERPL W P-5'-P-CCNC: 29 U/L (ref 1–33)
AMMONIA BLD-SCNC: 60 UMOL/L (ref 11–51)
ANION GAP SERPL CALCULATED.3IONS-SCNC: 7 MMOL/L (ref 5–15)
AST SERPL-CCNC: 32 U/L (ref 1–32)
BASOPHILS # BLD AUTO: 0.1 10*3/MM3 (ref 0–0.2)
BASOPHILS NFR BLD AUTO: 0.7 % (ref 0–1.5)
BILIRUB SERPL-MCNC: 4.1 MG/DL (ref 0–1.2)
BUN SERPL-MCNC: 20 MG/DL (ref 6–20)
BUN/CREAT SERPL: 25 (ref 7–25)
CALCIUM SPEC-SCNC: 8.3 MG/DL (ref 8.6–10.5)
CHLORIDE SERPL-SCNC: 104 MMOL/L (ref 98–107)
CO2 SERPL-SCNC: 27 MMOL/L (ref 22–29)
CREAT SERPL-MCNC: 0.8 MG/DL (ref 0.57–1)
D-LACTATE SERPL-SCNC: 1.1 MMOL/L (ref 0.5–2)
DEPRECATED RDW RBC AUTO: 56.4 FL (ref 37–54)
EOSINOPHIL # BLD AUTO: 0 10*3/MM3 (ref 0–0.4)
EOSINOPHIL NFR BLD AUTO: 0.1 % (ref 0.3–6.2)
ERYTHROCYTE [DISTWIDTH] IN BLOOD BY AUTOMATED COUNT: 17.7 % (ref 12.3–15.4)
GFR SERPL CREATININE-BSD FRML MDRD: 82 ML/MIN/1.73
GLOBULIN UR ELPH-MCNC: 2.1 GM/DL
GLUCOSE SERPL-MCNC: 132 MG/DL (ref 65–99)
HCT VFR BLD AUTO: 25.5 % (ref 34–46.6)
HCV GENTYP SERPL NAA+PROBE: NORMAL
HGB BLD-MCNC: 8.6 G/DL (ref 12–15.9)
INR PPP: 1.72 (ref 0.93–1.1)
LABORATORY COMMENT REPORT: NORMAL
LYMPHOCYTES # BLD AUTO: 1.4 10*3/MM3 (ref 0.7–3.1)
LYMPHOCYTES NFR BLD AUTO: 16.3 % (ref 19.6–45.3)
MAGNESIUM SERPL-MCNC: 1.7 MG/DL (ref 1.6–2.6)
MCH RBC QN AUTO: 31.4 PG (ref 26.6–33)
MCHC RBC AUTO-ENTMCNC: 33.6 G/DL (ref 31.5–35.7)
MCV RBC AUTO: 93.4 FL (ref 79–97)
MONOCYTES # BLD AUTO: 0.7 10*3/MM3 (ref 0.1–0.9)
MONOCYTES NFR BLD AUTO: 7.9 % (ref 5–12)
NEUTROPHILS NFR BLD AUTO: 6.2 10*3/MM3 (ref 1.7–7)
NEUTROPHILS NFR BLD AUTO: 75 % (ref 42.7–76)
NRBC BLD AUTO-RTO: 0.1 /100 WBC (ref 0–0.2)
PHOSPHATE SERPL-MCNC: 2.8 MG/DL (ref 2.5–4.5)
PLATELET # BLD AUTO: 59 10*3/MM3 (ref 140–450)
PMV BLD AUTO: 8.1 FL (ref 6–12)
POTASSIUM SERPL-SCNC: 3.5 MMOL/L (ref 3.5–5.2)
PROT SERPL-MCNC: 5.4 G/DL (ref 6–8.5)
PROTHROMBIN TIME: 18.3 SECONDS (ref 9.6–11.7)
RBC # BLD AUTO: 2.73 10*6/MM3 (ref 3.77–5.28)
SODIUM SERPL-SCNC: 138 MMOL/L (ref 136–145)
WBC # BLD AUTO: 8.3 10*3/MM3 (ref 3.4–10.8)

## 2021-08-13 PROCEDURE — 83605 ASSAY OF LACTIC ACID: CPT

## 2021-08-13 PROCEDURE — 97116 GAIT TRAINING THERAPY: CPT

## 2021-08-13 PROCEDURE — 82140 ASSAY OF AMMONIA: CPT | Performed by: NURSE PRACTITIONER

## 2021-08-13 PROCEDURE — 63710000001 PREDNISONE PER 1 MG: Performed by: INTERNAL MEDICINE

## 2021-08-13 PROCEDURE — 83735 ASSAY OF MAGNESIUM: CPT | Performed by: NURSE PRACTITIONER

## 2021-08-13 PROCEDURE — 80053 COMPREHEN METABOLIC PANEL: CPT | Performed by: NURSE PRACTITIONER

## 2021-08-13 PROCEDURE — 84100 ASSAY OF PHOSPHORUS: CPT | Performed by: NURSE PRACTITIONER

## 2021-08-13 PROCEDURE — 85025 COMPLETE CBC W/AUTO DIFF WBC: CPT | Performed by: NURSE PRACTITIONER

## 2021-08-13 PROCEDURE — 25010000002 CEFTRIAXONE PER 250 MG: Performed by: INTERNAL MEDICINE

## 2021-08-13 PROCEDURE — 97530 THERAPEUTIC ACTIVITIES: CPT

## 2021-08-13 PROCEDURE — 99233 SBSQ HOSP IP/OBS HIGH 50: CPT | Performed by: NURSE PRACTITIONER

## 2021-08-13 PROCEDURE — 99221 1ST HOSP IP/OBS SF/LOW 40: CPT | Performed by: NURSE PRACTITIONER

## 2021-08-13 PROCEDURE — 85610 PROTHROMBIN TIME: CPT | Performed by: NURSE PRACTITIONER

## 2021-08-13 RX ORDER — NICOTINE 21 MG/24HR
1 PATCH, TRANSDERMAL 24 HOURS TRANSDERMAL NIGHTLY
Status: DISCONTINUED | OUTPATIENT
Start: 2021-08-13 | End: 2021-08-15 | Stop reason: HOSPADM

## 2021-08-13 RX ORDER — SPIRONOLACTONE 25 MG/1
50 TABLET ORAL DAILY
Status: DISCONTINUED | OUTPATIENT
Start: 2021-08-13 | End: 2021-08-13

## 2021-08-13 RX ORDER — OXYCODONE HYDROCHLORIDE 5 MG/1
5 TABLET ORAL EVERY 4 HOURS PRN
Status: DISCONTINUED | OUTPATIENT
Start: 2021-08-13 | End: 2021-08-15 | Stop reason: HOSPADM

## 2021-08-13 RX ORDER — SODIUM CHLORIDE 9 MG/ML
100 INJECTION, SOLUTION INTRAVENOUS CONTINUOUS
Status: DISCONTINUED | OUTPATIENT
Start: 2021-08-13 | End: 2021-08-14

## 2021-08-13 RX ORDER — PANTOPRAZOLE SODIUM 40 MG/1
40 TABLET, DELAYED RELEASE ORAL
Status: DISCONTINUED | OUTPATIENT
Start: 2021-08-13 | End: 2021-08-15 | Stop reason: HOSPADM

## 2021-08-13 RX ORDER — MIDODRINE HYDROCHLORIDE 5 MG/1
10 TABLET ORAL EVERY 8 HOURS SCHEDULED
Status: DISCONTINUED | OUTPATIENT
Start: 2021-08-13 | End: 2021-08-15 | Stop reason: HOSPADM

## 2021-08-13 RX ORDER — MIDODRINE HYDROCHLORIDE 5 MG/1
10 TABLET ORAL
Status: DISCONTINUED | OUTPATIENT
Start: 2021-08-13 | End: 2021-08-13

## 2021-08-13 RX ADMIN — WATER 1 G: 100 INJECTION, SOLUTION INTRAVENOUS at 20:50

## 2021-08-13 RX ADMIN — Medication 10 ML: at 20:50

## 2021-08-13 RX ADMIN — MIDODRINE HYDROCHLORIDE 10 MG: 5 TABLET ORAL at 06:41

## 2021-08-13 RX ADMIN — Medication 100 MG: at 08:06

## 2021-08-13 RX ADMIN — PREDNISONE 20 MG: 20 TABLET ORAL at 08:06

## 2021-08-13 RX ADMIN — PANTOPRAZOLE SODIUM 40 MG: 40 TABLET, DELAYED RELEASE ORAL at 17:14

## 2021-08-13 RX ADMIN — SUCRALFATE 1 G: 1 TABLET ORAL at 20:50

## 2021-08-13 RX ADMIN — ACETAMINOPHEN 650 MG: 325 TABLET, FILM COATED ORAL at 22:21

## 2021-08-13 RX ADMIN — DIPHENHYDRAMINE HYDROCHLORIDE AND LIDOCAINE HYDROCHLORIDE AND ALUMINUM HYDROXIDE AND MAGNESIUM HYDRO 5 ML: KIT at 17:14

## 2021-08-13 RX ADMIN — DIPHENHYDRAMINE HYDROCHLORIDE AND LIDOCAINE HYDROCHLORIDE AND ALUMINUM HYDROXIDE AND MAGNESIUM HYDRO 5 ML: KIT at 20:50

## 2021-08-13 RX ADMIN — ACETAMINOPHEN 650 MG: 325 TABLET, FILM COATED ORAL at 03:04

## 2021-08-13 RX ADMIN — SUCRALFATE 1 G: 1 TABLET ORAL at 11:37

## 2021-08-13 RX ADMIN — OXYCODONE 5 MG: 5 TABLET ORAL at 22:21

## 2021-08-13 RX ADMIN — ZINC SULFATE 220 MG (50 MG) CAPSULE 220 MG: CAPSULE at 08:06

## 2021-08-13 RX ADMIN — OXYCODONE 5 MG: 5 TABLET ORAL at 03:04

## 2021-08-13 RX ADMIN — MIDODRINE HYDROCHLORIDE 10 MG: 5 TABLET ORAL at 20:50

## 2021-08-13 RX ADMIN — MIDODRINE HYDROCHLORIDE 10 MG: 5 TABLET ORAL at 13:48

## 2021-08-13 RX ADMIN — OXYCODONE 5 MG: 5 TABLET ORAL at 06:41

## 2021-08-13 RX ADMIN — SUCRALFATE 1 G: 1 TABLET ORAL at 06:41

## 2021-08-13 RX ADMIN — DIPHENHYDRAMINE HYDROCHLORIDE AND LIDOCAINE HYDROCHLORIDE AND ALUMINUM HYDROXIDE AND MAGNESIUM HYDRO 5 ML: KIT at 06:41

## 2021-08-13 RX ADMIN — FOLIC ACID 1 MG: 1 TABLET ORAL at 08:06

## 2021-08-13 RX ADMIN — PANTOPRAZOLE SODIUM 40 MG: 40 INJECTION, POWDER, FOR SOLUTION INTRAVENOUS at 08:06

## 2021-08-13 RX ADMIN — ACETAMINOPHEN 650 MG: 325 TABLET, FILM COATED ORAL at 06:41

## 2021-08-13 RX ADMIN — SUCRALFATE 1 G: 1 TABLET ORAL at 17:14

## 2021-08-13 RX ADMIN — DIPHENHYDRAMINE HYDROCHLORIDE AND LIDOCAINE HYDROCHLORIDE AND ALUMINUM HYDROXIDE AND MAGNESIUM HYDRO 5 ML: KIT at 11:37

## 2021-08-13 RX ADMIN — Medication 10 ML: at 08:06

## 2021-08-13 RX ADMIN — Medication 1 PATCH: at 22:21

## 2021-08-14 ENCOUNTER — INPATIENT HOSPITAL (OUTPATIENT)
Dept: URBAN - METROPOLITAN AREA HOSPITAL 84 | Facility: HOSPITAL | Age: 36
End: 2021-08-14
Payer: COMMERCIAL

## 2021-08-14 DIAGNOSIS — I85.01 ESOPHAGEAL VARICES WITH BLEEDING: ICD-10-CM

## 2021-08-14 DIAGNOSIS — R94.5 ABNORMAL RESULTS OF LIVER FUNCTION STUDIES: ICD-10-CM

## 2021-08-14 DIAGNOSIS — F10.10 ALCOHOL ABUSE, UNCOMPLICATED: ICD-10-CM

## 2021-08-14 DIAGNOSIS — K70.10 ALCOHOLIC HEPATITIS WITHOUT ASCITES: ICD-10-CM

## 2021-08-14 DIAGNOSIS — B18.2 CHRONIC VIRAL HEPATITIS C: ICD-10-CM

## 2021-08-14 DIAGNOSIS — D53.9 NUTRITIONAL ANEMIA, UNSPECIFIED: ICD-10-CM

## 2021-08-14 DIAGNOSIS — R13.10 DYSPHAGIA, UNSPECIFIED: ICD-10-CM

## 2021-08-14 PROBLEM — Z72.0 TOBACCO USE: Status: ACTIVE | Noted: 2017-03-16

## 2021-08-14 PROBLEM — K92.0 HEMATEMESIS WITH NAUSEA: Status: RESOLVED | Noted: 2021-08-09 | Resolved: 2021-08-14

## 2021-08-14 PROBLEM — I85.00 ESOPHAGEAL VARICES (HCC): Status: ACTIVE | Noted: 2021-08-14

## 2021-08-14 LAB
ALBUMIN SERPL-MCNC: 3 G/DL (ref 3.5–5.2)
ALBUMIN/GLOB SERPL: 1.3 G/DL
ALP SERPL-CCNC: 75 U/L (ref 39–117)
ALT SERPL W P-5'-P-CCNC: 30 U/L (ref 1–33)
ANION GAP SERPL CALCULATED.3IONS-SCNC: 6 MMOL/L (ref 5–15)
APTT PPP: 36.2 SECONDS (ref 24–31)
AST SERPL-CCNC: 36 U/L (ref 1–32)
BACTERIA SPEC AEROBE CULT: NORMAL
BACTERIA SPEC AEROBE CULT: NORMAL
BASOPHILS # BLD AUTO: 0 10*3/MM3 (ref 0–0.2)
BASOPHILS NFR BLD AUTO: 0.7 % (ref 0–1.5)
BILIRUB SERPL-MCNC: 3 MG/DL (ref 0–1.2)
BUN SERPL-MCNC: 23 MG/DL (ref 6–20)
BUN/CREAT SERPL: 28.4 (ref 7–25)
CA-I SERPL ISE-MCNC: 1.18 MMOL/L (ref 1.2–1.3)
CALCIUM SPEC-SCNC: 7.8 MG/DL (ref 8.6–10.5)
CHLORIDE SERPL-SCNC: 106 MMOL/L (ref 98–107)
CO2 SERPL-SCNC: 26 MMOL/L (ref 22–29)
CREAT SERPL-MCNC: 0.81 MG/DL (ref 0.57–1)
DEPRECATED RDW RBC AUTO: 59.1 FL (ref 37–54)
EOSINOPHIL # BLD AUTO: 0 10*3/MM3 (ref 0–0.4)
EOSINOPHIL NFR BLD AUTO: 0.5 % (ref 0.3–6.2)
ERYTHROCYTE [DISTWIDTH] IN BLOOD BY AUTOMATED COUNT: 18.1 % (ref 12.3–15.4)
GFR SERPL CREATININE-BSD FRML MDRD: 81 ML/MIN/1.73
GLOBULIN UR ELPH-MCNC: 2.4 GM/DL
GLUCOSE SERPL-MCNC: 104 MG/DL (ref 65–99)
HCT VFR BLD AUTO: 25.2 % (ref 34–46.6)
HGB BLD-MCNC: 8.3 G/DL (ref 12–15.9)
INR PPP: 1.77 (ref 0.93–1.1)
LYMPHOCYTES # BLD AUTO: 1.3 10*3/MM3 (ref 0.7–3.1)
LYMPHOCYTES NFR BLD AUTO: 23.1 % (ref 19.6–45.3)
MAGNESIUM SERPL-MCNC: 1.9 MG/DL (ref 1.6–2.6)
MCH RBC QN AUTO: 31.7 PG (ref 26.6–33)
MCHC RBC AUTO-ENTMCNC: 33 G/DL (ref 31.5–35.7)
MCV RBC AUTO: 95.9 FL (ref 79–97)
MONOCYTES # BLD AUTO: 0.6 10*3/MM3 (ref 0.1–0.9)
MONOCYTES NFR BLD AUTO: 11.1 % (ref 5–12)
NEUTROPHILS NFR BLD AUTO: 3.6 10*3/MM3 (ref 1.7–7)
NEUTROPHILS NFR BLD AUTO: 64.6 % (ref 42.7–76)
NRBC BLD AUTO-RTO: 0.1 /100 WBC (ref 0–0.2)
PHOSPHATE SERPL-MCNC: 2.6 MG/DL (ref 2.5–4.5)
PLATELET # BLD AUTO: 62 10*3/MM3 (ref 140–450)
PMV BLD AUTO: 8.6 FL (ref 6–12)
POTASSIUM SERPL-SCNC: 3.4 MMOL/L (ref 3.5–5.2)
PROT SERPL-MCNC: 5.4 G/DL (ref 6–8.5)
PROTHROMBIN TIME: 18.8 SECONDS (ref 9.6–11.7)
RBC # BLD AUTO: 2.62 10*6/MM3 (ref 3.77–5.28)
SODIUM SERPL-SCNC: 138 MMOL/L (ref 136–145)
WBC # BLD AUTO: 5.6 10*3/MM3 (ref 3.4–10.8)

## 2021-08-14 PROCEDURE — 83735 ASSAY OF MAGNESIUM: CPT | Performed by: NURSE PRACTITIONER

## 2021-08-14 PROCEDURE — 80053 COMPREHEN METABOLIC PANEL: CPT | Performed by: NURSE PRACTITIONER

## 2021-08-14 PROCEDURE — 99232 SBSQ HOSP IP/OBS MODERATE 35: CPT | Performed by: INTERNAL MEDICINE

## 2021-08-14 PROCEDURE — 99231 SBSQ HOSP IP/OBS SF/LOW 25: CPT | Performed by: NURSE PRACTITIONER

## 2021-08-14 PROCEDURE — 63710000001 PREDNISONE PER 1 MG: Performed by: INTERNAL MEDICINE

## 2021-08-14 PROCEDURE — 85025 COMPLETE CBC W/AUTO DIFF WBC: CPT | Performed by: NURSE PRACTITIONER

## 2021-08-14 PROCEDURE — 82330 ASSAY OF CALCIUM: CPT | Performed by: INTERNAL MEDICINE

## 2021-08-14 PROCEDURE — 84100 ASSAY OF PHOSPHORUS: CPT | Performed by: NURSE PRACTITIONER

## 2021-08-14 PROCEDURE — 85610 PROTHROMBIN TIME: CPT | Performed by: NURSE PRACTITIONER

## 2021-08-14 PROCEDURE — 85730 THROMBOPLASTIN TIME PARTIAL: CPT | Performed by: NURSE PRACTITIONER

## 2021-08-14 RX ORDER — POTASSIUM CHLORIDE 1.5 G/1.77G
40 POWDER, FOR SOLUTION ORAL AS NEEDED
Status: DISCONTINUED | OUTPATIENT
Start: 2021-08-14 | End: 2021-08-15 | Stop reason: HOSPADM

## 2021-08-14 RX ORDER — NADOLOL 20 MG/1
20 TABLET ORAL
Status: DISCONTINUED | OUTPATIENT
Start: 2021-08-14 | End: 2021-08-15 | Stop reason: HOSPADM

## 2021-08-14 RX ORDER — POTASSIUM CHLORIDE 20 MEQ/1
40 TABLET, EXTENDED RELEASE ORAL AS NEEDED
Status: DISCONTINUED | OUTPATIENT
Start: 2021-08-14 | End: 2021-08-15 | Stop reason: HOSPADM

## 2021-08-14 RX ADMIN — OXYCODONE 5 MG: 5 TABLET ORAL at 03:03

## 2021-08-14 RX ADMIN — MIDODRINE HYDROCHLORIDE 10 MG: 5 TABLET ORAL at 20:09

## 2021-08-14 RX ADMIN — OXYCODONE 5 MG: 5 TABLET ORAL at 23:10

## 2021-08-14 RX ADMIN — DIPHENHYDRAMINE HYDROCHLORIDE AND LIDOCAINE HYDROCHLORIDE AND ALUMINUM HYDROXIDE AND MAGNESIUM HYDRO 5 ML: KIT at 06:24

## 2021-08-14 RX ADMIN — OXYCODONE 5 MG: 5 TABLET ORAL at 16:48

## 2021-08-14 RX ADMIN — NADOLOL 20 MG: 20 TABLET ORAL at 17:02

## 2021-08-14 RX ADMIN — ACETAMINOPHEN 650 MG: 325 TABLET, FILM COATED ORAL at 23:10

## 2021-08-14 RX ADMIN — PANTOPRAZOLE SODIUM 40 MG: 40 TABLET, DELAYED RELEASE ORAL at 16:48

## 2021-08-14 RX ADMIN — DIPHENHYDRAMINE HYDROCHLORIDE AND LIDOCAINE HYDROCHLORIDE AND ALUMINUM HYDROXIDE AND MAGNESIUM HYDRO 5 ML: KIT at 16:48

## 2021-08-14 RX ADMIN — FOLIC ACID 1 MG: 1 TABLET ORAL at 08:29

## 2021-08-14 RX ADMIN — SUCRALFATE 1 G: 1 TABLET ORAL at 20:09

## 2021-08-14 RX ADMIN — PANTOPRAZOLE SODIUM 40 MG: 40 TABLET, DELAYED RELEASE ORAL at 06:24

## 2021-08-14 RX ADMIN — Medication 10 ML: at 20:11

## 2021-08-14 RX ADMIN — SUCRALFATE 1 G: 1 TABLET ORAL at 06:24

## 2021-08-14 RX ADMIN — SUCRALFATE 1 G: 1 TABLET ORAL at 16:48

## 2021-08-14 RX ADMIN — POTASSIUM CHLORIDE 40 MEQ: 1500 TABLET, EXTENDED RELEASE ORAL at 20:09

## 2021-08-14 RX ADMIN — Medication 10 ML: at 08:30

## 2021-08-14 RX ADMIN — MIDODRINE HYDROCHLORIDE 10 MG: 5 TABLET ORAL at 06:24

## 2021-08-14 RX ADMIN — POTASSIUM CHLORIDE 40 MEQ: 1500 TABLET, EXTENDED RELEASE ORAL at 13:40

## 2021-08-14 RX ADMIN — ZINC SULFATE 220 MG (50 MG) CAPSULE 220 MG: CAPSULE at 08:29

## 2021-08-14 RX ADMIN — Medication 1 PATCH: at 20:10

## 2021-08-14 RX ADMIN — SUCRALFATE 1 G: 1 TABLET ORAL at 11:36

## 2021-08-14 RX ADMIN — PREDNISONE 20 MG: 20 TABLET ORAL at 08:29

## 2021-08-14 RX ADMIN — ACETAMINOPHEN 650 MG: 325 TABLET, FILM COATED ORAL at 03:03

## 2021-08-14 RX ADMIN — MIDODRINE HYDROCHLORIDE 10 MG: 5 TABLET ORAL at 13:40

## 2021-08-14 RX ADMIN — DIPHENHYDRAMINE HYDROCHLORIDE AND LIDOCAINE HYDROCHLORIDE AND ALUMINUM HYDROXIDE AND MAGNESIUM HYDRO 5 ML: KIT at 20:09

## 2021-08-14 RX ADMIN — DIPHENHYDRAMINE HYDROCHLORIDE AND LIDOCAINE HYDROCHLORIDE AND ALUMINUM HYDROXIDE AND MAGNESIUM HYDRO 5 ML: KIT at 11:36

## 2021-08-14 RX ADMIN — Medication 100 MG: at 08:29

## 2021-08-15 ENCOUNTER — INPATIENT HOSPITAL (OUTPATIENT)
Dept: URBAN - METROPOLITAN AREA HOSPITAL 84 | Facility: HOSPITAL | Age: 36
End: 2021-08-15
Payer: COMMERCIAL

## 2021-08-15 VITALS
HEART RATE: 60 BPM | HEIGHT: 63 IN | RESPIRATION RATE: 18 BRPM | SYSTOLIC BLOOD PRESSURE: 101 MMHG | WEIGHT: 167 LBS | TEMPERATURE: 97.5 F | DIASTOLIC BLOOD PRESSURE: 61 MMHG | BODY MASS INDEX: 29.59 KG/M2 | OXYGEN SATURATION: 96 %

## 2021-08-15 DIAGNOSIS — K70.10 ALCOHOLIC HEPATITIS WITHOUT ASCITES: ICD-10-CM

## 2021-08-15 DIAGNOSIS — B18.2 CHRONIC VIRAL HEPATITIS C: ICD-10-CM

## 2021-08-15 DIAGNOSIS — R13.10 DYSPHAGIA, UNSPECIFIED: ICD-10-CM

## 2021-08-15 DIAGNOSIS — F10.10 ALCOHOL ABUSE, UNCOMPLICATED: ICD-10-CM

## 2021-08-15 DIAGNOSIS — I85.01 ESOPHAGEAL VARICES WITH BLEEDING: ICD-10-CM

## 2021-08-15 DIAGNOSIS — D53.9 NUTRITIONAL ANEMIA, UNSPECIFIED: ICD-10-CM

## 2021-08-15 DIAGNOSIS — R94.5 ABNORMAL RESULTS OF LIVER FUNCTION STUDIES: ICD-10-CM

## 2021-08-15 PROBLEM — K92.2 GI BLEED: Status: RESOLVED | Noted: 2021-08-09 | Resolved: 2021-08-15

## 2021-08-15 PROBLEM — N39.0 URINARY TRACT INFECTION: Status: RESOLVED | Noted: 2021-08-09 | Resolved: 2021-08-15

## 2021-08-15 PROBLEM — A41.9 SEPSIS: Status: RESOLVED | Noted: 2021-08-09 | Resolved: 2021-08-15

## 2021-08-15 PROBLEM — N17.9 ACUTE KIDNEY INJURY: Status: RESOLVED | Noted: 2021-08-09 | Resolved: 2021-08-15

## 2021-08-15 PROBLEM — D62 ACUTE BLOOD LOSS ANEMIA: Status: RESOLVED | Noted: 2021-08-09 | Resolved: 2021-08-15

## 2021-08-15 LAB
ALBUMIN SERPL-MCNC: 2.8 G/DL (ref 3.5–5.2)
ALBUMIN/GLOB SERPL: 1.3 G/DL
ALP SERPL-CCNC: 69 U/L (ref 39–117)
ALT SERPL W P-5'-P-CCNC: 32 U/L (ref 1–33)
ANION GAP SERPL CALCULATED.3IONS-SCNC: 3 MMOL/L (ref 5–15)
AST SERPL-CCNC: 37 U/L (ref 1–32)
BASOPHILS # BLD AUTO: 0 10*3/MM3 (ref 0–0.2)
BASOPHILS NFR BLD AUTO: 0.3 % (ref 0–1.5)
BILIRUB SERPL-MCNC: 2.9 MG/DL (ref 0–1.2)
BUN SERPL-MCNC: 16 MG/DL (ref 6–20)
BUN/CREAT SERPL: 23.5 (ref 7–25)
CALCIUM SPEC-SCNC: 7.8 MG/DL (ref 8.6–10.5)
CHLORIDE SERPL-SCNC: 110 MMOL/L (ref 98–107)
CO2 SERPL-SCNC: 26 MMOL/L (ref 22–29)
CREAT SERPL-MCNC: 0.68 MG/DL (ref 0.57–1)
DEPRECATED RDW RBC AUTO: 60.4 FL (ref 37–54)
EOSINOPHIL # BLD AUTO: 0 10*3/MM3 (ref 0–0.4)
EOSINOPHIL NFR BLD AUTO: 0.3 % (ref 0.3–6.2)
ERYTHROCYTE [DISTWIDTH] IN BLOOD BY AUTOMATED COUNT: 18.3 % (ref 12.3–15.4)
GFR SERPL CREATININE-BSD FRML MDRD: 99 ML/MIN/1.73
GLOBULIN UR ELPH-MCNC: 2.1 GM/DL
GLUCOSE SERPL-MCNC: 128 MG/DL (ref 65–99)
HCT VFR BLD AUTO: 25.6 % (ref 34–46.6)
HGB BLD-MCNC: 8.5 G/DL (ref 12–15.9)
INR PPP: 1.97 (ref 0.93–1.1)
LYMPHOCYTES # BLD AUTO: 1.7 10*3/MM3 (ref 0.7–3.1)
LYMPHOCYTES NFR BLD AUTO: 30.6 % (ref 19.6–45.3)
MAGNESIUM SERPL-MCNC: 1.9 MG/DL (ref 1.6–2.6)
MCH RBC QN AUTO: 31.7 PG (ref 26.6–33)
MCHC RBC AUTO-ENTMCNC: 33.1 G/DL (ref 31.5–35.7)
MCV RBC AUTO: 96 FL (ref 79–97)
MONOCYTES # BLD AUTO: 0.6 10*3/MM3 (ref 0.1–0.9)
MONOCYTES NFR BLD AUTO: 11.9 % (ref 5–12)
NEUTROPHILS NFR BLD AUTO: 3.1 10*3/MM3 (ref 1.7–7)
NEUTROPHILS NFR BLD AUTO: 56.9 % (ref 42.7–76)
NRBC BLD AUTO-RTO: 0 /100 WBC (ref 0–0.2)
PHOSPHATE SERPL-MCNC: 1.5 MG/DL (ref 2.5–4.5)
PLATELET # BLD AUTO: 54 10*3/MM3 (ref 140–450)
PMV BLD AUTO: 8.7 FL (ref 6–12)
POTASSIUM SERPL-SCNC: 3.9 MMOL/L (ref 3.5–5.2)
POTASSIUM SERPL-SCNC: 4 MMOL/L (ref 3.5–5.2)
PROT SERPL-MCNC: 4.9 G/DL (ref 6–8.5)
PROTHROMBIN TIME: 20.8 SECONDS (ref 9.6–11.7)
RBC # BLD AUTO: 2.67 10*6/MM3 (ref 3.77–5.28)
SODIUM SERPL-SCNC: 139 MMOL/L (ref 136–145)
WBC # BLD AUTO: 5.4 10*3/MM3 (ref 3.4–10.8)

## 2021-08-15 PROCEDURE — 63710000001 PREDNISONE PER 1 MG: Performed by: INTERNAL MEDICINE

## 2021-08-15 PROCEDURE — 99231 SBSQ HOSP IP/OBS SF/LOW 25: CPT | Performed by: NURSE PRACTITIONER

## 2021-08-15 PROCEDURE — 83735 ASSAY OF MAGNESIUM: CPT | Performed by: NURSE PRACTITIONER

## 2021-08-15 PROCEDURE — 84132 ASSAY OF SERUM POTASSIUM: CPT | Performed by: INTERNAL MEDICINE

## 2021-08-15 PROCEDURE — 85025 COMPLETE CBC W/AUTO DIFF WBC: CPT | Performed by: NURSE PRACTITIONER

## 2021-08-15 PROCEDURE — 85610 PROTHROMBIN TIME: CPT | Performed by: NURSE PRACTITIONER

## 2021-08-15 PROCEDURE — 84100 ASSAY OF PHOSPHORUS: CPT | Performed by: NURSE PRACTITIONER

## 2021-08-15 PROCEDURE — 99239 HOSP IP/OBS DSCHRG MGMT >30: CPT | Performed by: INTERNAL MEDICINE

## 2021-08-15 PROCEDURE — 80053 COMPREHEN METABOLIC PANEL: CPT | Performed by: NURSE PRACTITIONER

## 2021-08-15 RX ORDER — ZINC SULFATE 50(220)MG
220 CAPSULE ORAL DAILY
Qty: 30 CAPSULE | Refills: 0 | Status: SHIPPED | OUTPATIENT
Start: 2021-08-16 | End: 2021-10-03

## 2021-08-15 RX ORDER — PREDNISONE 20 MG/1
20 TABLET ORAL
Qty: 10 TABLET | Refills: 0 | Status: SHIPPED | OUTPATIENT
Start: 2021-08-16 | End: 2021-08-15

## 2021-08-15 RX ORDER — FOLIC ACID 1 MG/1
1 TABLET ORAL DAILY
Qty: 30 TABLET | Refills: 0 | Status: SHIPPED | OUTPATIENT
Start: 2021-08-16 | End: 2021-10-03

## 2021-08-15 RX ORDER — NADOLOL 20 MG/1
20 TABLET ORAL DAILY
Qty: 30 TABLET | Refills: 0 | Status: SHIPPED | OUTPATIENT
Start: 2021-08-15 | End: 2021-10-03

## 2021-08-15 RX ORDER — PANTOPRAZOLE SODIUM 40 MG/1
40 TABLET, DELAYED RELEASE ORAL
Qty: 60 TABLET | Refills: 0 | Status: SHIPPED | OUTPATIENT
Start: 2021-08-15 | End: 2021-10-03

## 2021-08-15 RX ORDER — PREDNISONE 20 MG/1
20 TABLET ORAL
Qty: 30 TABLET | Refills: 0 | Status: SHIPPED | OUTPATIENT
Start: 2021-08-16 | End: 2021-08-25 | Stop reason: HOSPADM

## 2021-08-15 RX ORDER — MIDODRINE HYDROCHLORIDE 10 MG/1
10 TABLET ORAL EVERY 8 HOURS SCHEDULED
Qty: 60 TABLET | Refills: 0 | Status: SHIPPED | OUTPATIENT
Start: 2021-08-15 | End: 2021-10-03

## 2021-08-15 RX ORDER — METHION/INOS/CHOL BT/B COM/LIV 110MG-86MG
100 CAPSULE ORAL DAILY
Qty: 30 TABLET | Refills: 0 | Status: SHIPPED | OUTPATIENT
Start: 2021-08-15

## 2021-08-15 RX ADMIN — SUCRALFATE 1 G: 1 TABLET ORAL at 13:10

## 2021-08-15 RX ADMIN — PANTOPRAZOLE SODIUM 40 MG: 40 TABLET, DELAYED RELEASE ORAL at 08:31

## 2021-08-15 RX ADMIN — NADOLOL 20 MG: 20 TABLET ORAL at 14:46

## 2021-08-15 RX ADMIN — FOLIC ACID 1 MG: 1 TABLET ORAL at 08:31

## 2021-08-15 RX ADMIN — DIPHENHYDRAMINE HYDROCHLORIDE AND LIDOCAINE HYDROCHLORIDE AND ALUMINUM HYDROXIDE AND MAGNESIUM HYDRO 5 ML: KIT at 13:10

## 2021-08-15 RX ADMIN — SUCRALFATE 1 G: 1 TABLET ORAL at 16:46

## 2021-08-15 RX ADMIN — Medication 100 MG: at 08:31

## 2021-08-15 RX ADMIN — PREDNISONE 20 MG: 20 TABLET ORAL at 08:32

## 2021-08-15 RX ADMIN — SUCRALFATE 1 G: 1 TABLET ORAL at 08:31

## 2021-08-15 RX ADMIN — Medication 10 ML: at 08:31

## 2021-08-15 RX ADMIN — PANTOPRAZOLE SODIUM 40 MG: 40 TABLET, DELAYED RELEASE ORAL at 16:46

## 2021-08-15 RX ADMIN — ZINC SULFATE 220 MG (50 MG) CAPSULE 220 MG: CAPSULE at 08:31

## 2021-08-15 RX ADMIN — OXYCODONE 5 MG: 5 TABLET ORAL at 11:12

## 2021-08-15 RX ADMIN — MIDODRINE HYDROCHLORIDE 10 MG: 5 TABLET ORAL at 04:34

## 2021-08-15 RX ADMIN — Medication 2 PACKET: at 16:46

## 2021-08-15 RX ADMIN — DIPHENHYDRAMINE HYDROCHLORIDE AND LIDOCAINE HYDROCHLORIDE AND ALUMINUM HYDROXIDE AND MAGNESIUM HYDRO 5 ML: KIT at 09:44

## 2021-08-15 RX ADMIN — MIDODRINE HYDROCHLORIDE 10 MG: 5 TABLET ORAL at 13:10

## 2021-08-15 RX ADMIN — OXYCODONE 5 MG: 5 TABLET ORAL at 04:34

## 2021-08-16 ENCOUNTER — READMISSION MANAGEMENT (OUTPATIENT)
Dept: CALL CENTER | Facility: HOSPITAL | Age: 36
End: 2021-08-16

## 2021-08-16 NOTE — OUTREACH NOTE
Prep Survey      Responses   [facility]   Is LACE score < 7 ?  No   Emergency Room discharge w/ pulse ox?  No   Eligibility  Readm Mgmt   Discharge diagnosis  Esophageal varices   Does the patient have one of the following disease processes/diagnoses(primary or secondary)?  Other   Does the patient have Home health ordered?  No   Is there a DME ordered?  No   Comments regarding appointments  Appt needed   Prep survey completed?  Yes          Nevaeh Morton RN

## 2021-08-19 ENCOUNTER — READMISSION MANAGEMENT (OUTPATIENT)
Dept: CALL CENTER | Facility: HOSPITAL | Age: 36
End: 2021-08-19

## 2021-08-19 NOTE — OUTREACH NOTE
Medical Week 1 Survey      Responses   [facility] patient discharged from?  [facility]   Does the patient have one of the following disease processes/diagnoses(primary or secondary)?  Other   Week 1 attempt successful?  No   Unsuccessful attempts  Attempt 1   Discharge diagnosis  Esophageal varices          Oumou Galicia RN

## 2021-08-20 ENCOUNTER — HOSPITAL ENCOUNTER (INPATIENT)
Facility: HOSPITAL | Age: 36
LOS: 2 days | Discharge: HOME | End: 2021-08-25
Attending: INTERNAL MEDICINE | Admitting: HOSPITALIST

## 2021-08-20 ENCOUNTER — APPOINTMENT (OUTPATIENT)
Dept: GENERAL RADIOLOGY | Facility: HOSPITAL | Age: 36
End: 2021-08-20

## 2021-08-20 DIAGNOSIS — R14.0 ABDOMINAL DISTENTION: Primary | ICD-10-CM

## 2021-08-20 DIAGNOSIS — R60.0 BILATERAL LOWER EXTREMITY EDEMA: ICD-10-CM

## 2021-08-20 LAB
ALBUMIN SERPL-MCNC: 3 G/DL (ref 3.5–5.2)
ALBUMIN/GLOB SERPL: 1 G/DL
ALP SERPL-CCNC: 110 U/L (ref 39–117)
ALT SERPL W P-5'-P-CCNC: 69 U/L (ref 1–33)
ANION GAP SERPL CALCULATED.3IONS-SCNC: 5 MMOL/L (ref 5–15)
AST SERPL-CCNC: 71 U/L (ref 1–32)
BASOPHILS # BLD AUTO: 0.1 10*3/MM3 (ref 0–0.2)
BASOPHILS NFR BLD AUTO: 0.6 % (ref 0–1.5)
BILIRUB SERPL-MCNC: 2.3 MG/DL (ref 0–1.2)
BUN SERPL-MCNC: 9 MG/DL (ref 6–20)
BUN/CREAT SERPL: 14.1 (ref 7–25)
CALCIUM SPEC-SCNC: 8.1 MG/DL (ref 8.6–10.5)
CHLORIDE SERPL-SCNC: 104 MMOL/L (ref 98–107)
CO2 SERPL-SCNC: 27 MMOL/L (ref 22–29)
CREAT SERPL-MCNC: 0.64 MG/DL (ref 0.57–1)
DEPRECATED RDW RBC AUTO: 61 FL (ref 37–54)
EOSINOPHIL # BLD AUTO: 0 10*3/MM3 (ref 0–0.4)
EOSINOPHIL NFR BLD AUTO: 0.2 % (ref 0.3–6.2)
ERYTHROCYTE [DISTWIDTH] IN BLOOD BY AUTOMATED COUNT: 18.6 % (ref 12.3–15.4)
GFR SERPL CREATININE-BSD FRML MDRD: 106 ML/MIN/1.73
GLOBULIN UR ELPH-MCNC: 2.9 GM/DL
GLUCOSE SERPL-MCNC: 93 MG/DL (ref 65–99)
HCT VFR BLD AUTO: 29.1 % (ref 34–46.6)
HGB BLD-MCNC: 9.8 G/DL (ref 12–15.9)
LYMPHOCYTES # BLD AUTO: 1.7 10*3/MM3 (ref 0.7–3.1)
LYMPHOCYTES NFR BLD AUTO: 19.6 % (ref 19.6–45.3)
MCH RBC QN AUTO: 32.5 PG (ref 26.6–33)
MCHC RBC AUTO-ENTMCNC: 33.7 G/DL (ref 31.5–35.7)
MCV RBC AUTO: 96.4 FL (ref 79–97)
MONOCYTES # BLD AUTO: 0.6 10*3/MM3 (ref 0.1–0.9)
MONOCYTES NFR BLD AUTO: 6.8 % (ref 5–12)
NEUTROPHILS NFR BLD AUTO: 6.4 10*3/MM3 (ref 1.7–7)
NEUTROPHILS NFR BLD AUTO: 72.8 % (ref 42.7–76)
NRBC BLD AUTO-RTO: 0 /100 WBC (ref 0–0.2)
NT-PROBNP SERPL-MCNC: 641.8 PG/ML (ref 0–450)
PLATELET # BLD AUTO: 94 10*3/MM3 (ref 140–450)
PMV BLD AUTO: 9.1 FL (ref 6–12)
POTASSIUM SERPL-SCNC: 3.6 MMOL/L (ref 3.5–5.2)
PROT SERPL-MCNC: 5.9 G/DL (ref 6–8.5)
RBC # BLD AUTO: 3.01 10*6/MM3 (ref 3.77–5.28)
SODIUM SERPL-SCNC: 136 MMOL/L (ref 136–145)
TSH SERPL DL<=0.05 MIU/L-ACNC: 0.84 UIU/ML (ref 0.27–4.2)
WBC # BLD AUTO: 8.8 10*3/MM3 (ref 3.4–10.8)

## 2021-08-20 PROCEDURE — 71045 X-RAY EXAM CHEST 1 VIEW: CPT

## 2021-08-20 PROCEDURE — G0378 HOSPITAL OBSERVATION PER HR: HCPCS

## 2021-08-20 PROCEDURE — 83880 ASSAY OF NATRIURETIC PEPTIDE: CPT | Performed by: NURSE PRACTITIONER

## 2021-08-20 PROCEDURE — 99222 1ST HOSP IP/OBS MODERATE 55: CPT | Performed by: NURSE PRACTITIONER

## 2021-08-20 PROCEDURE — 84443 ASSAY THYROID STIM HORMONE: CPT | Performed by: NURSE PRACTITIONER

## 2021-08-20 PROCEDURE — 25010000002 FUROSEMIDE PER 20 MG: Performed by: NURSE PRACTITIONER

## 2021-08-20 PROCEDURE — 80053 COMPREHEN METABOLIC PANEL: CPT | Performed by: NURSE PRACTITIONER

## 2021-08-20 PROCEDURE — 85025 COMPLETE CBC W/AUTO DIFF WBC: CPT | Performed by: NURSE PRACTITIONER

## 2021-08-20 PROCEDURE — 99284 EMERGENCY DEPT VISIT MOD MDM: CPT

## 2021-08-20 RX ORDER — FUROSEMIDE 10 MG/ML
20 INJECTION INTRAMUSCULAR; INTRAVENOUS ONCE
Status: COMPLETED | OUTPATIENT
Start: 2021-08-20 | End: 2021-08-20

## 2021-08-20 RX ORDER — SODIUM CHLORIDE 0.9 % (FLUSH) 0.9 %
10 SYRINGE (ML) INJECTION AS NEEDED
Status: DISCONTINUED | OUTPATIENT
Start: 2021-08-20 | End: 2021-08-25 | Stop reason: HOSPADM

## 2021-08-20 RX ADMIN — FUROSEMIDE 20 MG: 10 INJECTION, SOLUTION INTRAMUSCULAR; INTRAVENOUS at 22:14

## 2021-08-21 ENCOUNTER — READMISSION MANAGEMENT (OUTPATIENT)
Dept: CALL CENTER | Facility: HOSPITAL | Age: 36
End: 2021-08-21

## 2021-08-21 PROBLEM — I50.31 DIASTOLIC CHF, ACUTE: Status: ACTIVE | Noted: 2021-08-21

## 2021-08-21 PROBLEM — E83.51 HYPOCALCEMIA: Status: ACTIVE | Noted: 2021-08-21

## 2021-08-21 LAB
ALBUMIN SERPL-MCNC: 2.9 G/DL (ref 3.5–5.2)
ALBUMIN/GLOB SERPL: 1.1 G/DL
ALP SERPL-CCNC: 88 U/L (ref 39–117)
ALT SERPL W P-5'-P-CCNC: 64 U/L (ref 1–33)
AMMONIA BLD-SCNC: 24 UMOL/L (ref 11–51)
ANION GAP SERPL CALCULATED.3IONS-SCNC: 7 MMOL/L (ref 5–15)
AST SERPL-CCNC: 70 U/L (ref 1–32)
BASOPHILS # BLD AUTO: 0.1 10*3/MM3 (ref 0–0.2)
BASOPHILS NFR BLD AUTO: 1.2 % (ref 0–1.5)
BILIRUB SERPL-MCNC: 2.8 MG/DL (ref 0–1.2)
BUN SERPL-MCNC: 8 MG/DL (ref 6–20)
BUN/CREAT SERPL: 12.1 (ref 7–25)
CA-I SERPL ISE-MCNC: 1.16 MMOL/L (ref 1.2–1.3)
CALCIUM SPEC-SCNC: 8.2 MG/DL (ref 8.6–10.5)
CHLORIDE SERPL-SCNC: 105 MMOL/L (ref 98–107)
CHOLEST SERPL-MCNC: 76 MG/DL (ref 0–200)
CO2 SERPL-SCNC: 28 MMOL/L (ref 22–29)
CREAT SERPL-MCNC: 0.66 MG/DL (ref 0.57–1)
DEPRECATED RDW RBC AUTO: 64.8 FL (ref 37–54)
EOSINOPHIL # BLD AUTO: 0 10*3/MM3 (ref 0–0.4)
EOSINOPHIL NFR BLD AUTO: 0.5 % (ref 0.3–6.2)
ERYTHROCYTE [DISTWIDTH] IN BLOOD BY AUTOMATED COUNT: 19 % (ref 12.3–15.4)
GFR SERPL CREATININE-BSD FRML MDRD: 103 ML/MIN/1.73
GLOBULIN UR ELPH-MCNC: 2.7 GM/DL
GLUCOSE SERPL-MCNC: 78 MG/DL (ref 65–99)
HCT VFR BLD AUTO: 31 % (ref 34–46.6)
HDLC SERPL-MCNC: 25 MG/DL (ref 40–60)
HGB BLD-MCNC: 10.1 G/DL (ref 12–15.9)
LDLC SERPL CALC-MCNC: 37 MG/DL (ref 0–100)
LDLC/HDLC SERPL: 1.56 {RATIO}
LYMPHOCYTES # BLD AUTO: 2.4 10*3/MM3 (ref 0.7–3.1)
LYMPHOCYTES NFR BLD AUTO: 27.6 % (ref 19.6–45.3)
MCH RBC QN AUTO: 31.6 PG (ref 26.6–33)
MCHC RBC AUTO-ENTMCNC: 32.7 G/DL (ref 31.5–35.7)
MCV RBC AUTO: 96.6 FL (ref 79–97)
MONOCYTES # BLD AUTO: 0.6 10*3/MM3 (ref 0.1–0.9)
MONOCYTES NFR BLD AUTO: 6.5 % (ref 5–12)
NEUTROPHILS NFR BLD AUTO: 5.5 10*3/MM3 (ref 1.7–7)
NEUTROPHILS NFR BLD AUTO: 64.2 % (ref 42.7–76)
NRBC BLD AUTO-RTO: 0.1 /100 WBC (ref 0–0.2)
NT-PROBNP SERPL-MCNC: 514 PG/ML (ref 0–450)
PLATELET # BLD AUTO: 87 10*3/MM3 (ref 140–450)
PMV BLD AUTO: 8.8 FL (ref 6–12)
POTASSIUM SERPL-SCNC: 3.8 MMOL/L (ref 3.5–5.2)
PROT SERPL-MCNC: 5.6 G/DL (ref 6–8.5)
RBC # BLD AUTO: 3.21 10*6/MM3 (ref 3.77–5.28)
SARS-COV-2 RNA PNL SPEC NAA+PROBE: NOT DETECTED
SODIUM SERPL-SCNC: 140 MMOL/L (ref 136–145)
TRIGL SERPL-MCNC: 60 MG/DL (ref 0–150)
VLDLC SERPL-MCNC: 14 MG/DL (ref 5–40)
WBC # BLD AUTO: 8.6 10*3/MM3 (ref 3.4–10.8)

## 2021-08-21 PROCEDURE — 83880 ASSAY OF NATRIURETIC PEPTIDE: CPT | Performed by: NURSE PRACTITIONER

## 2021-08-21 PROCEDURE — 80053 COMPREHEN METABOLIC PANEL: CPT | Performed by: NURSE PRACTITIONER

## 2021-08-21 PROCEDURE — 82140 ASSAY OF AMMONIA: CPT | Performed by: NURSE PRACTITIONER

## 2021-08-21 PROCEDURE — 63710000001 PREDNISONE PER 5 MG: Performed by: NURSE PRACTITIONER

## 2021-08-21 PROCEDURE — 25010000002 FUROSEMIDE PER 20 MG: Performed by: NURSE PRACTITIONER

## 2021-08-21 PROCEDURE — 87635 SARS-COV-2 COVID-19 AMP PRB: CPT | Performed by: INTERNAL MEDICINE

## 2021-08-21 PROCEDURE — 82330 ASSAY OF CALCIUM: CPT | Performed by: NURSE PRACTITIONER

## 2021-08-21 PROCEDURE — 80061 LIPID PANEL: CPT | Performed by: NURSE PRACTITIONER

## 2021-08-21 PROCEDURE — 85025 COMPLETE CBC W/AUTO DIFF WBC: CPT | Performed by: NURSE PRACTITIONER

## 2021-08-21 PROCEDURE — G0378 HOSPITAL OBSERVATION PER HR: HCPCS

## 2021-08-21 PROCEDURE — 99232 SBSQ HOSP IP/OBS MODERATE 35: CPT | Performed by: INTERNAL MEDICINE

## 2021-08-21 PROCEDURE — 36415 COLL VENOUS BLD VENIPUNCTURE: CPT | Performed by: NURSE PRACTITIONER

## 2021-08-21 RX ORDER — SODIUM CHLORIDE 0.9 % (FLUSH) 0.9 %
10 SYRINGE (ML) INJECTION EVERY 12 HOURS SCHEDULED
Status: DISCONTINUED | OUTPATIENT
Start: 2021-08-21 | End: 2021-08-25 | Stop reason: HOSPADM

## 2021-08-21 RX ORDER — DIPHENHYDRAMINE HYDROCHLORIDE AND LIDOCAINE HYDROCHLORIDE AND ALUMINUM HYDROXIDE AND MAGNESIUM HYDRO
5 KIT
Status: DISCONTINUED | OUTPATIENT
Start: 2021-08-21 | End: 2021-08-25 | Stop reason: HOSPADM

## 2021-08-21 RX ORDER — POTASSIUM CHLORIDE 20 MEQ/1
40 TABLET, EXTENDED RELEASE ORAL AS NEEDED
Status: DISCONTINUED | OUTPATIENT
Start: 2021-08-21 | End: 2021-08-25 | Stop reason: HOSPADM

## 2021-08-21 RX ORDER — ONDANSETRON 2 MG/ML
4 INJECTION INTRAMUSCULAR; INTRAVENOUS EVERY 6 HOURS PRN
Status: DISCONTINUED | OUTPATIENT
Start: 2021-08-21 | End: 2021-08-25 | Stop reason: HOSPADM

## 2021-08-21 RX ORDER — NADOLOL 20 MG/1
20 TABLET ORAL DAILY
Status: DISCONTINUED | OUTPATIENT
Start: 2021-08-21 | End: 2021-08-25 | Stop reason: HOSPADM

## 2021-08-21 RX ORDER — NITROGLYCERIN 0.4 MG/1
0.4 TABLET SUBLINGUAL
Status: DISCONTINUED | OUTPATIENT
Start: 2021-08-21 | End: 2021-08-25 | Stop reason: HOSPADM

## 2021-08-21 RX ORDER — CHOLECALCIFEROL (VITAMIN D3) 125 MCG
5 CAPSULE ORAL NIGHTLY PRN
Status: DISCONTINUED | OUTPATIENT
Start: 2021-08-21 | End: 2021-08-25 | Stop reason: HOSPADM

## 2021-08-21 RX ORDER — FUROSEMIDE 10 MG/ML
40 INJECTION INTRAMUSCULAR; INTRAVENOUS EVERY 8 HOURS
Status: DISCONTINUED | OUTPATIENT
Start: 2021-08-21 | End: 2021-08-24

## 2021-08-21 RX ORDER — MAGNESIUM SULFATE 1 G/100ML
1 INJECTION INTRAVENOUS AS NEEDED
Status: DISCONTINUED | OUTPATIENT
Start: 2021-08-21 | End: 2021-08-25 | Stop reason: HOSPADM

## 2021-08-21 RX ORDER — ACETAMINOPHEN 160 MG/5ML
650 SOLUTION ORAL EVERY 4 HOURS PRN
Status: DISCONTINUED | OUTPATIENT
Start: 2021-08-21 | End: 2021-08-25 | Stop reason: HOSPADM

## 2021-08-21 RX ORDER — ONDANSETRON 4 MG/1
4 TABLET, FILM COATED ORAL EVERY 6 HOURS PRN
Status: DISCONTINUED | OUTPATIENT
Start: 2021-08-21 | End: 2021-08-25 | Stop reason: HOSPADM

## 2021-08-21 RX ORDER — ACETAMINOPHEN 325 MG/1
650 TABLET ORAL EVERY 4 HOURS PRN
Status: DISCONTINUED | OUTPATIENT
Start: 2021-08-21 | End: 2021-08-25 | Stop reason: HOSPADM

## 2021-08-21 RX ORDER — PREDNISONE 1 MG/1
2.5 TABLET ORAL
Status: COMPLETED | OUTPATIENT
Start: 2021-08-24 | End: 2021-08-25

## 2021-08-21 RX ORDER — ALUMINA, MAGNESIA, AND SIMETHICONE 2400; 2400; 240 MG/30ML; MG/30ML; MG/30ML
15 SUSPENSION ORAL EVERY 6 HOURS PRN
Status: DISCONTINUED | OUTPATIENT
Start: 2021-08-21 | End: 2021-08-25 | Stop reason: HOSPADM

## 2021-08-21 RX ORDER — PREDNISONE 1 MG/1
5 TABLET ORAL
Status: COMPLETED | OUTPATIENT
Start: 2021-08-22 | End: 2021-08-23

## 2021-08-21 RX ORDER — MAGNESIUM SULFATE HEPTAHYDRATE 40 MG/ML
2 INJECTION, SOLUTION INTRAVENOUS AS NEEDED
Status: DISCONTINUED | OUTPATIENT
Start: 2021-08-21 | End: 2021-08-25 | Stop reason: HOSPADM

## 2021-08-21 RX ORDER — ZINC SULFATE 50(220)MG
220 CAPSULE ORAL DAILY
Status: DISCONTINUED | OUTPATIENT
Start: 2021-08-21 | End: 2021-08-25 | Stop reason: HOSPADM

## 2021-08-21 RX ORDER — MIDODRINE HYDROCHLORIDE 5 MG/1
10 TABLET ORAL EVERY 8 HOURS SCHEDULED
Status: DISCONTINUED | OUTPATIENT
Start: 2021-08-21 | End: 2021-08-25 | Stop reason: HOSPADM

## 2021-08-21 RX ORDER — PREDNISONE 10 MG/1
10 TABLET ORAL
Status: COMPLETED | OUTPATIENT
Start: 2021-08-21 | End: 2021-08-21

## 2021-08-21 RX ORDER — SODIUM CHLORIDE 0.9 % (FLUSH) 0.9 %
10 SYRINGE (ML) INJECTION AS NEEDED
Status: DISCONTINUED | OUTPATIENT
Start: 2021-08-21 | End: 2021-08-25 | Stop reason: HOSPADM

## 2021-08-21 RX ORDER — ACETAMINOPHEN 650 MG/1
650 SUPPOSITORY RECTAL EVERY 4 HOURS PRN
Status: DISCONTINUED | OUTPATIENT
Start: 2021-08-21 | End: 2021-08-25 | Stop reason: HOSPADM

## 2021-08-21 RX ORDER — PANTOPRAZOLE SODIUM 40 MG/1
40 TABLET, DELAYED RELEASE ORAL
Status: DISCONTINUED | OUTPATIENT
Start: 2021-08-21 | End: 2021-08-25 | Stop reason: HOSPADM

## 2021-08-21 RX ORDER — NICOTINE 21 MG/24HR
1 PATCH, TRANSDERMAL 24 HOURS TRANSDERMAL
Status: DISCONTINUED | OUTPATIENT
Start: 2021-08-21 | End: 2021-08-25 | Stop reason: HOSPADM

## 2021-08-21 RX ORDER — FOLIC ACID 1 MG/1
1 TABLET ORAL DAILY
Status: DISCONTINUED | OUTPATIENT
Start: 2021-08-21 | End: 2021-08-25 | Stop reason: HOSPADM

## 2021-08-21 RX ADMIN — MIDODRINE HYDROCHLORIDE 10 MG: 5 TABLET ORAL at 14:18

## 2021-08-21 RX ADMIN — PANTOPRAZOLE SODIUM 40 MG: 40 TABLET, DELAYED RELEASE ORAL at 08:37

## 2021-08-21 RX ADMIN — FUROSEMIDE 40 MG: 10 INJECTION, SOLUTION INTRAMUSCULAR; INTRAVENOUS at 21:01

## 2021-08-21 RX ADMIN — FOLIC ACID 1 MG: 1 TABLET ORAL at 08:37

## 2021-08-21 RX ADMIN — Medication 10 ML: at 21:01

## 2021-08-21 RX ADMIN — DIPHENHYDRAMINE HYDROCHLORIDE AND LIDOCAINE HYDROCHLORIDE AND ALUMINUM HYDROXIDE AND MAGNESIUM HYDRO 5 ML: KIT at 21:01

## 2021-08-21 RX ADMIN — FUROSEMIDE 40 MG: 10 INJECTION, SOLUTION INTRAMUSCULAR; INTRAVENOUS at 14:18

## 2021-08-21 RX ADMIN — PREDNISONE 10 MG: 10 TABLET ORAL at 08:37

## 2021-08-21 RX ADMIN — ZINC SULFATE 220 MG (50 MG) CAPSULE 220 MG: CAPSULE at 14:19

## 2021-08-21 RX ADMIN — DIPHENHYDRAMINE HYDROCHLORIDE AND LIDOCAINE HYDROCHLORIDE AND ALUMINUM HYDROXIDE AND MAGNESIUM HYDRO 5 ML: KIT at 16:35

## 2021-08-21 RX ADMIN — NICOTINE 1 PATCH: 14 PATCH, EXTENDED RELEASE TRANSDERMAL at 14:19

## 2021-08-21 RX ADMIN — PANTOPRAZOLE SODIUM 40 MG: 40 TABLET, DELAYED RELEASE ORAL at 16:35

## 2021-08-21 RX ADMIN — Medication 100 MG: at 08:38

## 2021-08-21 RX ADMIN — FUROSEMIDE 40 MG: 10 INJECTION, SOLUTION INTRAMUSCULAR; INTRAVENOUS at 05:57

## 2021-08-21 RX ADMIN — MIDODRINE HYDROCHLORIDE 10 MG: 5 TABLET ORAL at 21:01

## 2021-08-21 RX ADMIN — MIDODRINE HYDROCHLORIDE 10 MG: 5 TABLET ORAL at 05:57

## 2021-08-21 NOTE — ED PROVIDER NOTES
Subjective   History: Patient is a 36-year-old female complains of abdominal discomfort as well as leg swelling.  Prior to being hospitalized she was on a diuretic, has history of hep C.  Was admitted to the hospital, 8/15/2021, hypotensive treated for esophageal varices and discharged, 8/20/2021.  Reports after hospitalization they did not continue her diuretics due to her blood pressure.  Since discharge she has had progressive worsening of abdominal swelling and leg swelling.  Says the abdominal pressure is worse when standing.  Denies fever chills nausea vomiting, has had some intermittent shortness of breath but reports it is not that bad, no dysuria.    Gastro: [place]    Onset: Since discharge from hospital 8/15/2020  Location: Abdomen bilateral legs  Duration: Constant  Character: Edema  Aggravating/Alleviating factors: None  Radiation not applicable  Severity: Moderate to severe            Review of Systems   Constitutional: Negative for chills, fatigue and fever.   HENT: Negative for congestion, sore throat, tinnitus and trouble swallowing.    Eyes: Negative for photophobia, discharge and visual disturbance.   Respiratory: Positive for shortness of breath. Negative for cough.    Cardiovascular: Positive for leg swelling. Negative for chest pain.   Gastrointestinal: Positive for abdominal distention. Negative for abdominal pain, diarrhea, nausea and vomiting.   Genitourinary: Negative for dysuria, frequency and urgency.   Musculoskeletal: Negative for back pain and myalgias.   Skin: Negative for rash.   Neurological: Negative for dizziness and headaches.   Psychiatric/Behavioral: Negative for confusion.       No past medical history on file.    No Known Allergies    Past Surgical History:   Procedure Laterality Date   • ENDOSCOPY N/A 8/10/2021    Procedure: ESOPHAGOGASTRODUODENOSCOPY AT BEDSIDE with banding x4;  Surgeon: Clark Ochoa MD;  Location: [facility] ENDOSCOPY;  Service: Gastroenterology;   Laterality: N/A;  post: esophageal varices with banding x4       No family history on file.    Social History     Socioeconomic History   • Marital status: Single     Spouse name: Not on file   • Number of children: Not on file   • Years of education: Not on file   • Highest education level: Not on file   Tobacco Use   • Smoking status: Current Every Day Smoker     Packs/day: 0.50   • Smokeless tobacco: Never Used           Objective   Physical Exam  Vitals reviewed.   Constitutional:       General: She is not in acute distress.     Appearance: She is obese. She is not toxic-appearing.   HENT:      Head: Normocephalic and atraumatic.      Mouth/Throat:      Mouth: Mucous membranes are moist.      Pharynx: Oropharynx is clear.   Eyes:      General: Scleral icterus present.      Extraocular Movements: Extraocular movements intact.   Cardiovascular:      Rate and Rhythm: Normal rate.      Heart sounds: Normal heart sounds. No murmur heard.     Pulmonary:      Effort: Pulmonary effort is normal. No respiratory distress.      Breath sounds: Normal breath sounds. No wheezing, rhonchi or rales.   Abdominal:      General: Bowel sounds are normal. There is distension.      Palpations: Abdomen is soft.      Tenderness: There is no abdominal tenderness.   Skin:     General: Skin is warm and dry.      Comments: Mild jaundice   Neurological:      Mental Status: She is alert and oriented to person, place, and time.   Psychiatric:         Mood and Affect: Mood normal.         Behavior: Behavior normal.         Procedures           ED Course  .vs  Labs Reviewed   COMPREHENSIVE METABOLIC PANEL - Abnormal; Notable for the following components:       Result Value    Calcium 8.1 (*)     Total Protein 5.9 (*)     Albumin 3.00 (*)     ALT (SGPT) 69 (*)     AST (SGOT) 71 (*)     Total Bilirubin 2.3 (*)     All other components within normal limits    Narrative:     GFR Normal >60  Chronic Kidney Disease <60  Kidney Failure <15     BNP  (IN-HOUSE) - Abnormal; Notable for the following components:    proBNP 641.8 (*)     All other components within normal limits    Narrative:     Among patients with dyspnea, NT-proBNP is highly sensitive for the detection of acute congestive heart failure. In addition NT-proBNP of <300 pg/ml effectively rules out acute congestive heart failure with 99% negative predictive value.    Results may be falsely decreased if patient taking Biotin.     CBC WITH AUTO DIFFERENTIAL - Abnormal; Notable for the following components:    RBC 3.01 (*)     Hemoglobin 9.8 (*)     Hematocrit 29.1 (*)     RDW 18.6 (*)     RDW-SD 61.0 (*)     Platelets 94 (*)     Eosinophil % 0.2 (*)     All other components within normal limits   BNP (IN-HOUSE)   TSH   CBC AND DIFFERENTIAL    Narrative:     The following orders were created for panel order CBC & Differential.  Procedure                               Abnormality         Status                     ---------                               -----------         ------                     CBC Auto Differential[[id number]]        Abnormal            Final result                 Please view results for these tests on the individual orders.     Medications   sodium chloride 0.9 % flush 10 mL (has no administration in time range)   furosemide (LASIX) injection 20 mg (has no administration in time range)     XR Chest 1 View    Result Date: 8/20/2021   IMPRESSION: No active cardiopulmonary disease.   Electronically Signed By-Dioni Quintero DO On:8/20/2021 9:45 PM This report was finalized on [id number] by  Dioni Quintero DO.                                           MDM     I examined the patient using the appropriate personal protective equipment.      DISPOSITION:   Chart Review:  Comorbidity:  has no past medical history on file.  Differentials:this list is not all inclusive and does not constitute the entirety of considered causes -->   ECG: interpreted by ER physician and reviewed by myself:  Not applicable  Labs: CBC WBC 8.8 H&H 9.8 and 29.1 platelets 94    Imaging: Was interpreted by physician and reviewed by myself:  XR Chest 1 View    Result Date: 8/20/2021   IMPRESSION: No active cardiopulmonary disease.   Electronically Signed By-Dioni Quintero DO On:8/20/2021 9:45 PM This report was finalized on [id number] by  Dioni Quintero DO.      Disposition/Treatment:    Patient does not appear to be in any respiratory distress she is room air 98%.  Blood pressure on arrival is 111/58 on reevaluation of patient it was 98 systolic.  .8.   Platelets 94 which is improved over the last week were as low as 54.  Elevation in AST and total bili unchanged over the last week.  Chest x-ray negative for any vascular congestion.  Patient will be admitted for monitoring of diuresis and blood pressure.  Spoke with Theresa FITCH, will be admitted to hospitalist group.  Discussed starting slow on diuresis tonight drop patient pressure.  Felt patient may benefit from being on a unit for higher-level care in case she ends up on a pressor.  Lasix 20 mg ordered while in ER.  Discussed results with patient verbalized understanding agreeable plan of care.    Final diagnoses:   Abdominal distention   Bilateral lower extremity edema       ED Disposition  ED Disposition     ED Disposition Condition Comment    Decision to Admit  Level of Care: Progressive Care [20]   Admitting Physician: SATISH SOLITARIO [[id number]]   Attending Physician: SATISH SOLITARIO [[id number]]   Patient Class: Observation [104]            No follow-up provider specified.       Medication List      No changes were made to your prescriptions during this visit.          Elodia Richardson, APRN  08/20/21 9450

## 2021-08-21 NOTE — PLAN OF CARE
Goal Outcome Evaluation:  Plan of Care Reviewed With: patient        Progress: no change  Outcome Summary: pt was recently transfered up from the ER. pt was admitted with LE swelling and SOB. pt is currently resting in bed with minimal complaints at this time. pt has been encouraged and assited to turn to prevent skin breakdown.

## 2021-08-21 NOTE — PROGRESS NOTES
[facility] Medicine Services Daily Progress Note    Patient Name: Alondra Eduardo  : 1985  MRN: [id number]  Primary Care Physician:  Provider, No Known  Date of admission: 2021      Subjective      Chief Complaint: Edema    Alondra Eduardo is a 36 y.o. female with past medical history of esophageal varices, elevated liver enzymes, hypotension, lower leg edema who presented to [facility] on 2021 complaining of bilateral lower extremity and abdominal edema.  Patient complains of occasional shortness of air and unintentional weight gain.  Patient denied chest pain, cough, nausea, vomiting, fever, chills.  When patient was discharged from [facility] for esophageal varices her diuretics were discontinued.  Her PCP recommended for her to go back to [facility] ED to evaluate and treat.     In the ED, chest x-ray showed no active cardiopulmonary disease.  All labs unremarkable except .8, calcium 8.1, total protein 5.9, ALT 69, AST 71, bilirubin 2.3, albumin 3, hemoglobin 9.8, hematocrit 29.1.  Lasix given in the ED.  Patient admitted to hospitalist group for further evaluation and treatment    2021: Patient seen and examined.  Patient has improvement with diuresis  We will continue current treatment   Echo: Normal LV size function, EF estimated greater than 70%     Review of Systems   Constitutional: Positive for malaise/fatigue and weight gain. Negative for chills and fever.   HENT: Negative.    Eyes: Negative.    Cardiovascular: Negative.  Negative for chest pain.   Respiratory: Positive for shortness of breath.    Endocrine: Negative.    Skin: Negative.    Musculoskeletal: Negative.    Gastrointestinal: Negative.  Negative for nausea and vomiting.   Genitourinary: Negative.    Neurological: Positive for weakness.   Psychiatric/Behavioral: Negative.    Allergic/Immunologic: Negative.      ROS       Objective      Vitals:   Temp:  [97.6  °F (36.4 °C)-98.6 °F (37 °C)] 97.6 °F (36.4 °C)  Heart Rate:  [52-82] 82  Resp:  [14-16] 14  BP: ()/(41-75) 95/41      Vitals and nursing note reviewed.   Constitutional:       Appearance: Normal appearance.   HENT:      Head: Normocephalic.      Nose: Nose normal.      Mouth/Throat:      Pharynx: Oropharynx is clear.   Eyes:      Extraocular Movements: Extraocular movements intact.   Cardiovascular:      Rate and Rhythm: Normal rate and regular rhythm.      Pulses: Normal pulses.      Heart sounds: Normal heart sounds.   Pulmonary:      Effort: Pulmonary effort is normal.      Comments: Lung sounds diminished throughout  Abdominal:      General: Bowel sounds are normal. There is distension.      Palpations: Abdomen is soft.   Musculoskeletal:         General: Normal range of motion.      Cervical back: Normal range of motion.      Right lower leg: Edema present.      Left lower leg: Edema present.   Skin:     General: Skin is warm and dry.   Neurological:      Mental Status: She is alert and oriented to person, place, and time.   Psychiatric:         Mood and Affect: Mood normal.         Behavior: Behavior normal.   Physical Exam        Result Review    Result Review:  I have personally reviewed the results from the time of this admission to 8/21/2021 12:29 EDT and agree with these findings:  [x]  Laboratory  [x]  Microbiology  [x]  Radiology  []  EKG/Telemetry   []  Cardiology/Vascular   []  Pathology  []  Old records  []  Other:  Most notable findings include:         Assessment/Plan      Brief Patient Summary:  Alondra Eduardo is a 36 y.o. female who is admitted for lower extremity swelling after recent discharge.  She has underlying alcohol associated hepatotoxicity.  She has responded well to diuresis.      folic acid, 1 mg, Oral, Daily  furosemide, 40 mg, Intravenous, Q8H  midodrine, 10 mg, Oral, Q8H  nadolol, 20 mg, Oral, Daily  nicotine, 1 patch, Transdermal, Q24H  pantoprazole, 40 mg, Oral, BID  AC  [START ON 8/24/2021] predniSONE, 2.5 mg, Oral, Daily With Breakfast  [START ON 8/22/2021] predniSONE, 5 mg, Oral, Daily With Breakfast  sodium chloride, 10 mL, Intravenous, Q12H  thiamine, 100 mg, Oral, Daily  zinc sulfate, 220 mg, Oral, Daily             Active Hospital Problems:  Active Hospital Problems    Diagnosis    • Hypocalcemia    • Diastolic CHF, acute (CMS/HCC)    • Lower leg edema    • Esophageal varices (CMS/HCC)    • Elevated liver enzymes    • Hypotension    • Tobacco use      Plan:     Diastolic CHF, acute  Lower leg edema  -Echo on 8/10/2021 showed EF greater than 70%  -Lasix given in the ED, continue  -Daily BNP  -Daily weights  -I&O every shift  -Continuous cardiac monitoring  -TSH ordered  -Cardiac/2 g sodium diet ordered  -Continue home nadolol     Esophageal varices (CMS/HCC)  -Continue home Protonix  -Continue folic acid, thiamine, zinc     Elevated liver enzymes  -Monitor liver enzymes  -Ammonia level ordered     Hypotension  -Continue home midodrine        Hypocalcemia  -Calcium 8.1, ionized calcium ordered     Tobacco use  -Nicotine patch ordered  -encourage tobacco cessation  -Lipid panel ordered  -Tapering off home prednisone          DVT prophylaxis:  Mechanical DVT prophylaxis orders are present.    CODE STATUS:    Level Of Support Discussed With: Patient  Code Status: CPR  Medical Interventions (Level of Support Prior to Arrest): Full      Disposition:  I expect patient to be discharged 3 to 4 days.    This patient has been examined wearing appropriate Personal Protective Equipment and discussed with hospital infection control department. 08/21/21      Electronically signed by Beti Retana MD, 08/21/21, 12:29 EDT.  [facility] Hospitalist Team

## 2021-08-21 NOTE — OUTREACH NOTE
Medical Week 2 Survey      Responses   [facility] patient discharged from?  [facility]   Does the patient have one of the following disease processes/diagnoses(primary or secondary)?  Other   Week 2 attempt successful?  No   Revoke  Readmitted          Danita Rhodes RN

## 2021-08-21 NOTE — H&P
[facility] Medicine Services      Patient Name: Alondra Eduardo  : 1985  MRN: [id number]  Primary Care Physician:  Provider, No Known  Date of admission: 2021      Subjective      Chief Complaint: Edema    History of Present Illness: Alondra Eduardo is a 36 y.o. female with past medical history of esophageal varices, elevated liver enzymes, hypotension, lower leg edema who presented to [facility] on 2021 complaining of bilateral lower extremity and abdominal edema.  Patient complains of occasional shortness of air and unintentional weight gain.  Patient denied chest pain, cough, nausea, vomiting, fever, chills.  When patient was discharged from [facility] for esophageal varices her diuretics were discontinued.  Her PCP recommended for her to go back to [facility] ED to evaluate and treat.    In the ED, chest x-ray showed no active cardiopulmonary disease.  All labs unremarkable except .8, calcium 8.1, total protein 5.9, ALT 69, AST 71, bilirubin 2.3, albumin 3, hemoglobin 9.8, hematocrit 29.1.  Lasix given in the ED.  Patient admitted to hospitalist group for further evaluation and treatment    Review of Systems   Constitutional: Positive for malaise/fatigue and weight gain. Negative for chills and fever.   HENT: Negative.    Eyes: Negative.    Cardiovascular: Negative.  Negative for chest pain.   Respiratory: Positive for shortness of breath.    Endocrine: Negative.    Skin: Negative.    Musculoskeletal: Negative.    Gastrointestinal: Negative.  Negative for nausea and vomiting.   Genitourinary: Negative.    Neurological: Positive for weakness.   Psychiatric/Behavioral: Negative.    Allergic/Immunologic: Negative.        Personal History     Past Medical History:   Diagnosis Date   • Esophageal varices (CMS/HCC)        Past Surgical History:   Procedure Laterality Date   • ENDOSCOPY N/A 8/10/2021    Procedure: ESOPHAGOGASTRODUODENOSCOPY  AT BEDSIDE with banding x4;  Surgeon: Clark Ochoa MD;  Location: [facility] ENDOSCOPY;  Service: Gastroenterology;  Laterality: N/A;  post: esophageal varices with banding x4       Family History: family history includes Cancer in her mother; Diabetes in her father; Heart disease in her paternal grandfather. Otherwise pertinent FHx was reviewed and not pertinent to current issue.    Social History:  reports that she has been smoking. She has been smoking about 0.50 packs per day. She has never used smokeless tobacco. She reports previous alcohol use. She reports previous drug use.    Home Medications:  Prior to Admission Medications     Prescriptions Last Dose Informant Patient Reported? Taking?    folic acid (FOLVITE) 1 MG tablet   No No    Take 1 tablet by mouth Daily.    midodrine (PROAMATINE) 10 MG tablet   No No    Take 1 tablet by mouth Every 8 (Eight) Hours.    nadolol (CORGARD) 20 MG tablet   No No    Take 1 tablet by mouth Daily.    pantoprazole (PROTONIX) 40 MG EC tablet   No No    Take 1 tablet by mouth 2 (Two) Times a Day Before Meals.    predniSONE (DELTASONE) 20 MG tablet   No No    Take 1 tablet by mouth Daily With Breakfast.    thiamine (thiamine) 100 MG tablet tablet   No No    Take 1 tablet by mouth Daily.    zinc sulfate (ZINCATE) 220 (50 Zn) MG capsule   No No    Take 1 capsule by mouth Daily.            Allergies:  No Known Allergies    Objective      Vitals:   Temp:  [97.6 °F (36.4 °C)-98.6 °F (37 °C)] 97.6 °F (36.4 °C)  Heart Rate:  [52-79] 54  Resp:  [14-16] 14  BP: ()/(49-75) 92/49    Physical Exam  Vitals and nursing note reviewed.   Constitutional:       Appearance: Normal appearance.   HENT:      Head: Normocephalic.      Nose: Nose normal.      Mouth/Throat:      Pharynx: Oropharynx is clear.   Eyes:      Extraocular Movements: Extraocular movements intact.   Cardiovascular:      Rate and Rhythm: Normal rate and regular rhythm.      Pulses: Normal pulses.      Heart sounds:  Normal heart sounds.   Pulmonary:      Effort: Pulmonary effort is normal.      Comments: Lung sounds diminished throughout  Abdominal:      General: Bowel sounds are normal. There is distension.      Palpations: Abdomen is soft.   Musculoskeletal:         General: Normal range of motion.      Cervical back: Normal range of motion.      Right lower leg: Edema present.      Left lower leg: Edema present.   Skin:     General: Skin is warm and dry.   Neurological:      Mental Status: She is alert and oriented to person, place, and time.   Psychiatric:         Mood and Affect: Mood normal.         Behavior: Behavior normal.        Result Review    Result Review:  I have personally reviewed the results from the time of this admission to 8/21/2021 11:03 EDT and agree with these findings:  [x]  Laboratory  []  Microbiology  [x]  Radiology  [x]  EKG/Telemetry   []  Cardiology/Vascular   []  Pathology  []  Old records  []  Other:  Most notable findings include: as above    Assessment/Plan        Active Hospital Problems:  Active Hospital Problems    Diagnosis    • Esophageal varices (CMS/HCC)    • Hypocalcemia    • Diastolic CHF, acute (CMS/HCC)    • Lower leg edema    • Elevated liver enzymes    • Hypotension    • Tobacco use      Plan:         -Lipid panel ordered        -Tapering off home prednisone    Diastolic CHF, acute  Lower leg edema  -Echo on 8/10/2021 showed EF greater than 70%  -Lasix given in the ED, continue  -Daily BNP  -Daily weights  -I&O every shift  -Continuous cardiac monitoring  -TSH ordered  -Cardiac/2 g sodium diet ordered  -Continue home nadolol    Esophageal varices (CMS/HCC)  -Continue home Protonix  -Continue folic acid, thiamine, zinc    Elevated liver enzymes  -Monitor liver enzymes  -Ammonia level ordered    Hypotension  -Continue home midodrine      Hypocalcemia  -Calcium 8.1, ionized calcium ordered    Tobacco use  -Nicotine patch ordered  -encourage tobacco cessation      DVT  prophylaxis:  Mechanical DVT prophylaxis orders are present.    CODE STATUS:    Level Of Support Discussed With: Patient  Code Status: CPR  Medical Interventions (Level of Support Prior to Arrest): Full    Admission Status:  I believe this patient meets observation status.    I discussed the patient's findings and my recommendations with patient.    This patient has been examined wearing appropriate Personal Protective Equipment  08/21/21      Signature: Electronically signed by SEGUN Ewing, 08/21/21, 10:59 AM EDT.

## 2021-08-22 LAB
ALBUMIN SERPL-MCNC: 2.5 G/DL (ref 3.5–5.2)
ALBUMIN/GLOB SERPL: 0.9 G/DL
ALP SERPL-CCNC: 90 U/L (ref 39–117)
ALT SERPL W P-5'-P-CCNC: 60 U/L (ref 1–33)
ANION GAP SERPL CALCULATED.3IONS-SCNC: 7 MMOL/L (ref 5–15)
ANION GAP SERPL CALCULATED.3IONS-SCNC: 8 MMOL/L (ref 5–15)
AST SERPL-CCNC: 58 U/L (ref 1–32)
BASOPHILS # BLD AUTO: 0.1 10*3/MM3 (ref 0–0.2)
BASOPHILS NFR BLD AUTO: 0.6 % (ref 0–1.5)
BILIRUB SERPL-MCNC: 2.5 MG/DL (ref 0–1.2)
BUN SERPL-MCNC: 6 MG/DL (ref 6–20)
BUN SERPL-MCNC: 8 MG/DL (ref 6–20)
BUN/CREAT SERPL: 12.7 (ref 7–25)
BUN/CREAT SERPL: 7.7 (ref 7–25)
CALCIUM SPEC-SCNC: 7.9 MG/DL (ref 8.6–10.5)
CALCIUM SPEC-SCNC: 8.3 MG/DL (ref 8.6–10.5)
CHLORIDE SERPL-SCNC: 97 MMOL/L (ref 98–107)
CHLORIDE SERPL-SCNC: 98 MMOL/L (ref 98–107)
CO2 SERPL-SCNC: 30 MMOL/L (ref 22–29)
CO2 SERPL-SCNC: 31 MMOL/L (ref 22–29)
CREAT SERPL-MCNC: 0.63 MG/DL (ref 0.57–1)
CREAT SERPL-MCNC: 0.78 MG/DL (ref 0.57–1)
DEPRECATED RDW RBC AUTO: 60.8 FL (ref 37–54)
EOSINOPHIL # BLD AUTO: 0.1 10*3/MM3 (ref 0–0.4)
EOSINOPHIL NFR BLD AUTO: 0.5 % (ref 0.3–6.2)
ERYTHROCYTE [DISTWIDTH] IN BLOOD BY AUTOMATED COUNT: 18.8 % (ref 12.3–15.4)
GFR SERPL CREATININE-BSD FRML MDRD: 108 ML/MIN/1.73
GFR SERPL CREATININE-BSD FRML MDRD: 85 ML/MIN/1.73
GLOBULIN UR ELPH-MCNC: 2.8 GM/DL
GLUCOSE SERPL-MCNC: 156 MG/DL (ref 65–99)
GLUCOSE SERPL-MCNC: 82 MG/DL (ref 65–99)
HCT VFR BLD AUTO: 26.6 % (ref 34–46.6)
HGB BLD-MCNC: 9.1 G/DL (ref 12–15.9)
LYMPHOCYTES # BLD AUTO: 2.7 10*3/MM3 (ref 0.7–3.1)
LYMPHOCYTES NFR BLD AUTO: 25.5 % (ref 19.6–45.3)
MAGNESIUM SERPL-MCNC: 1.5 MG/DL (ref 1.6–2.6)
MCH RBC QN AUTO: 32.9 PG (ref 26.6–33)
MCHC RBC AUTO-ENTMCNC: 34.4 G/DL (ref 31.5–35.7)
MCV RBC AUTO: 95.7 FL (ref 79–97)
MONOCYTES # BLD AUTO: 0.7 10*3/MM3 (ref 0.1–0.9)
MONOCYTES NFR BLD AUTO: 6.1 % (ref 5–12)
NEUTROPHILS NFR BLD AUTO: 67.3 % (ref 42.7–76)
NEUTROPHILS NFR BLD AUTO: 7.1 10*3/MM3 (ref 1.7–7)
NRBC BLD AUTO-RTO: 0.1 /100 WBC (ref 0–0.2)
PLATELET # BLD AUTO: 90 10*3/MM3 (ref 140–450)
PMV BLD AUTO: 9 FL (ref 6–12)
POTASSIUM SERPL-SCNC: 2.9 MMOL/L (ref 3.5–5.2)
POTASSIUM SERPL-SCNC: 3.1 MMOL/L (ref 3.5–5.2)
PROT SERPL-MCNC: 5.3 G/DL (ref 6–8.5)
RBC # BLD AUTO: 2.77 10*6/MM3 (ref 3.77–5.28)
SODIUM SERPL-SCNC: 135 MMOL/L (ref 136–145)
SODIUM SERPL-SCNC: 136 MMOL/L (ref 136–145)
WBC # BLD AUTO: 10.6 10*3/MM3 (ref 3.4–10.8)

## 2021-08-22 PROCEDURE — 85025 COMPLETE CBC W/AUTO DIFF WBC: CPT | Performed by: INTERNAL MEDICINE

## 2021-08-22 PROCEDURE — 63710000001 PREDNISONE PER 5 MG: Performed by: NURSE PRACTITIONER

## 2021-08-22 PROCEDURE — 25010000002 MAGNESIUM SULFATE 2 GM/50ML SOLUTION: Performed by: NURSE PRACTITIONER

## 2021-08-22 PROCEDURE — 99232 SBSQ HOSP IP/OBS MODERATE 35: CPT | Performed by: INTERNAL MEDICINE

## 2021-08-22 PROCEDURE — 83735 ASSAY OF MAGNESIUM: CPT | Performed by: NURSE PRACTITIONER

## 2021-08-22 PROCEDURE — 80053 COMPREHEN METABOLIC PANEL: CPT | Performed by: INTERNAL MEDICINE

## 2021-08-22 PROCEDURE — 36415 COLL VENOUS BLD VENIPUNCTURE: CPT | Performed by: INTERNAL MEDICINE

## 2021-08-22 PROCEDURE — G0378 HOSPITAL OBSERVATION PER HR: HCPCS

## 2021-08-22 PROCEDURE — 25010000002 FUROSEMIDE PER 20 MG: Performed by: NURSE PRACTITIONER

## 2021-08-22 RX ORDER — PREDNISONE 2.5 MG
2.5 TABLET ORAL
Qty: 2 TABLET | Refills: 0 | Status: SHIPPED | OUTPATIENT
Start: 2021-08-24 | End: 2021-08-26

## 2021-08-22 RX ORDER — FUROSEMIDE 40 MG/1
40 TABLET ORAL DAILY
Qty: 30 TABLET | Refills: 0 | Status: SHIPPED | OUTPATIENT
Start: 2021-08-22 | End: 2021-10-03

## 2021-08-22 RX ORDER — POTASSIUM CHLORIDE 7.45 MG/ML
10 INJECTION INTRAVENOUS
Status: DISCONTINUED | OUTPATIENT
Start: 2021-08-22 | End: 2021-08-25 | Stop reason: HOSPADM

## 2021-08-22 RX ORDER — PREDNISONE 1 MG/1
5 TABLET ORAL
Qty: 1 TABLET | Refills: 0 | Status: SHIPPED | OUTPATIENT
Start: 2021-08-23 | End: 2021-08-24

## 2021-08-22 RX ADMIN — FUROSEMIDE 40 MG: 10 INJECTION, SOLUTION INTRAMUSCULAR; INTRAVENOUS at 21:55

## 2021-08-22 RX ADMIN — POTASSIUM CHLORIDE 40 MEQ: 1500 TABLET, EXTENDED RELEASE ORAL at 13:39

## 2021-08-22 RX ADMIN — Medication 10 ML: at 20:32

## 2021-08-22 RX ADMIN — DIPHENHYDRAMINE HYDROCHLORIDE AND LIDOCAINE HYDROCHLORIDE AND ALUMINUM HYDROXIDE AND MAGNESIUM HYDRO 5 ML: KIT at 07:33

## 2021-08-22 RX ADMIN — Medication 100 MG: at 08:44

## 2021-08-22 RX ADMIN — POTASSIUM CHLORIDE 40 MEQ: 1500 TABLET, EXTENDED RELEASE ORAL at 17:39

## 2021-08-22 RX ADMIN — MIDODRINE HYDROCHLORIDE 10 MG: 5 TABLET ORAL at 22:43

## 2021-08-22 RX ADMIN — NADOLOL 20 MG: 20 TABLET ORAL at 08:44

## 2021-08-22 RX ADMIN — DIPHENHYDRAMINE HYDROCHLORIDE AND LIDOCAINE HYDROCHLORIDE AND ALUMINUM HYDROXIDE AND MAGNESIUM HYDRO 5 ML: KIT at 20:31

## 2021-08-22 RX ADMIN — Medication 10 ML: at 08:46

## 2021-08-22 RX ADMIN — FUROSEMIDE 40 MG: 10 INJECTION, SOLUTION INTRAMUSCULAR; INTRAVENOUS at 06:41

## 2021-08-22 RX ADMIN — ACETAMINOPHEN 650 MG: 325 TABLET, FILM COATED ORAL at 22:43

## 2021-08-22 RX ADMIN — PANTOPRAZOLE SODIUM 40 MG: 40 TABLET, DELAYED RELEASE ORAL at 17:39

## 2021-08-22 RX ADMIN — MIDODRINE HYDROCHLORIDE 10 MG: 5 TABLET ORAL at 06:41

## 2021-08-22 RX ADMIN — FOLIC ACID 1 MG: 1 TABLET ORAL at 08:44

## 2021-08-22 RX ADMIN — MIDODRINE HYDROCHLORIDE 10 MG: 5 TABLET ORAL at 13:39

## 2021-08-22 RX ADMIN — FUROSEMIDE 40 MG: 10 INJECTION, SOLUTION INTRAMUSCULAR; INTRAVENOUS at 20:31

## 2021-08-22 RX ADMIN — DIPHENHYDRAMINE HYDROCHLORIDE AND LIDOCAINE HYDROCHLORIDE AND ALUMINUM HYDROXIDE AND MAGNESIUM HYDRO 5 ML: KIT at 17:39

## 2021-08-22 RX ADMIN — PREDNISONE 5 MG: 5 TABLET ORAL at 08:44

## 2021-08-22 RX ADMIN — PANTOPRAZOLE SODIUM 40 MG: 40 TABLET, DELAYED RELEASE ORAL at 07:33

## 2021-08-22 RX ADMIN — ZINC SULFATE 220 MG (50 MG) CAPSULE 220 MG: CAPSULE at 08:44

## 2021-08-22 RX ADMIN — FUROSEMIDE 40 MG: 10 INJECTION, SOLUTION INTRAMUSCULAR; INTRAVENOUS at 13:39

## 2021-08-22 RX ADMIN — DIPHENHYDRAMINE HYDROCHLORIDE AND LIDOCAINE HYDROCHLORIDE AND ALUMINUM HYDROXIDE AND MAGNESIUM HYDRO 5 ML: KIT at 12:12

## 2021-08-22 RX ADMIN — NICOTINE 1 PATCH: 14 PATCH, EXTENDED RELEASE TRANSDERMAL at 08:45

## 2021-08-22 RX ADMIN — MAGNESIUM SULFATE HEPTAHYDRATE 2 G: 2 INJECTION, SOLUTION INTRAVENOUS at 13:39

## 2021-08-22 RX ADMIN — ACETAMINOPHEN 650 MG: 325 TABLET, FILM COATED ORAL at 07:11

## 2021-08-22 NOTE — PLAN OF CARE
Pt resting will continue to monitor  Problem: Adult Inpatient Plan of Care  Goal: Readiness for Transition of Care  Intervention: Mutually Develop Transition Plan  Recent Flowsheet Documentation  Taken 8/22/2021 2492 by Deven Chamberlain, RN  Equipment Currently Used at Home: none   Goal Outcome Evaluation:

## 2021-08-22 NOTE — PROGRESS NOTES
[facility] Medicine Services Daily Progress Note    Patient Name: Alondra Eduardo  : 1985  MRN: [id number]  Primary Care Physician:  Provider, No Known  Date of admission: 2021      Subjective      Chief Complaint: Edema    Alondra Eduardo is a 36 y.o. female with past medical history of esophageal varices, elevated liver enzymes, hypotension, lower leg edema who presented to [facility] on 2021 complaining of bilateral lower extremity and abdominal edema.  Patient complains of occasional shortness of air and unintentional weight gain.  Patient denied chest pain, cough, nausea, vomiting, fever, chills.  When patient was discharged from [facility] for esophageal varices her diuretics were discontinued.  Her PCP recommended for her to go back to [facility] ED to evaluate and treat.     In the ED, chest x-ray showed no active cardiopulmonary disease.  All labs unremarkable except .8, calcium 8.1, total protein 5.9, ALT 69, AST 71, bilirubin 2.3, albumin 3, hemoglobin 9.8, hematocrit 29.1.  Lasix given in the ED.  Patient admitted to hospitalist group for further evaluation and treatment    2021   Patient seen and examined.  Patient has had significant improvement in symptoms and fluid status  Advised to be compliant with her 2 g sodium/ fluid restricted diet  Will replace electrolytes     Review of Systems   Constitutional: Positive for malaise/fatigue and weight gain. Negative for chills and fever.   HENT: Negative.    Eyes: Negative.    Cardiovascular: Negative.  Negative for chest pain.   Respiratory: Positive for shortness of breath.    Endocrine: Negative.    Skin: Negative.    Musculoskeletal: Negative.    Gastrointestinal: Negative.  Negative for nausea and vomiting.   Genitourinary: Negative.    Neurological: Positive for weakness.   Psychiatric/Behavioral: Negative.    Allergic/Immunologic: Negative.      ROS       Objective       Vitals:   Temp:  [98.5 °F (36.9 °C)-99.7 °F (37.6 °C)] 98.8 °F (37.1 °C)  Heart Rate:  [84-93] 93  Resp:  [14-16] 14  BP: ()/(44-50) 95/49    Physical Exam   Vitals and nursing note reviewed.   Constitutional:       Appearance: Normal appearance.   HENT:      Head: Normocephalic.      Nose: Nose normal.      Mouth/Throat:      Pharynx: Oropharynx is clear.   Eyes:      Extraocular Movements: Extraocular movements intact.   Cardiovascular:      Rate and Rhythm: Normal rate and regular rhythm.      Pulses: Normal pulses.      Heart sounds: Normal heart sounds.   Pulmonary:      Effort: Pulmonary effort is normal.      Comments: Lung sounds diminished throughout  Abdominal:      General: Bowel sounds are normal. There is distension.      Palpations: Abdomen is soft.   Musculoskeletal:         General: Normal range of motion.      Cervical back: Normal range of motion.      Right lower leg: Edema present.      Left lower leg: Edema present.   Skin:     General: Skin is warm and dry.   Neurological:      Mental Status: She is alert and oriented to person, place, and time.   Psychiatric:         Mood and Affect: Mood normal.         Behavior: Behavior normal.      Result Review    Result Review:  I have personally reviewed the results from the time of this admission to 8/22/2021 14:45 EDT and agree with these findings:  [x]  Laboratory  [x]  Microbiology  [x]  Radiology  []  EKG/Telemetry   []  Cardiology/Vascular   []  Pathology  []  Old records  []  Other:  Most notable findings include:        Assessment/Plan      Brief Patient Summary:  Alondra Eduardo is a 36 y.o. female who  is admitted for lower extremity swelling after recent discharge.  She has underlying alcohol associated hepatotoxicity.  She has responded well to diuresis.    First Mouthwash (Magic Mouthwash), 5 mL, Swish & Spit, 4x Daily AC & at Bedtime  folic acid, 1 mg, Oral, Daily  furosemide, 40 mg, Intravenous, Q8H  midodrine, 10 mg, Oral,  Q8H  nadolol, 20 mg, Oral, Daily  nicotine, 1 patch, Transdermal, Q24H  pantoprazole, 40 mg, Oral, BID AC  [START ON 8/24/2021] predniSONE, 2.5 mg, Oral, Daily With Breakfast  predniSONE, 5 mg, Oral, Daily With Breakfast  sodium chloride, 10 mL, Intravenous, Q12H  thiamine, 100 mg, Oral, Daily  zinc sulfate, 220 mg, Oral, Daily             Active Hospital Problems:  Active Hospital Problems    Diagnosis    • Hypocalcemia    • Diastolic CHF, acute (CMS/HCC)    • Lower leg edema    • Esophageal varices (CMS/HCC)    • Elevated liver enzymes    • Hypotension    • Tobacco use      Plan:   Diastolic CHF, acute  Lower leg edema  -Echo on 8/10/2021 showed EF greater than 70%  -Lasix given in the ED, continue  -Daily BNP  -Daily weights  -I&O every shift  -Continuous cardiac monitoring  -TSH ordered  -Cardiac/2 g sodium diet ordered  -Continue home nadolol     Esophageal varices (CMS/HCC)  -Continue home Protonix  -Continue folic acid, thiamine, zinc     Elevated liver enzymes  -Monitor liver enzymes  -Ammonia level ordered     Hypotension  -Continue home midodrine        Hypocalcemia  -Calcium 8.1, ionized calcium ordered     Tobacco use  -Nicotine patch ordered  -encourage tobacco cessation  -Lipid panel ordered  -Tapering off home prednisone    DVT prophylaxis:  Mechanical DVT prophylaxis orders are present.    CODE STATUS:    Level Of Support Discussed With: Patient  Code Status: CPR  Medical Interventions (Level of Support Prior to Arrest): Full      Disposition:  I expect patient to be discharged 8/23/2021.    This patient has been examined wearing appropriate Personal Protective Equipment and discussed with hospital infection control department. 08/22/21      Electronically signed by Beti Retana MD, 08/22/21, 14:45 EDT.  [facility] Hospitalist Team

## 2021-08-23 PROBLEM — R14.0 ABDOMINAL DISTENTION: Status: ACTIVE | Noted: 2021-08-23

## 2021-08-23 LAB
ALBUMIN SERPL-MCNC: 2.5 G/DL (ref 3.5–5.2)
ALBUMIN/GLOB SERPL: 1 G/DL
ALP SERPL-CCNC: 96 U/L (ref 39–117)
ALT SERPL W P-5'-P-CCNC: 59 U/L (ref 1–33)
ANION GAP SERPL CALCULATED.3IONS-SCNC: 7 MMOL/L (ref 5–15)
AST SERPL-CCNC: 63 U/L (ref 1–32)
BASOPHILS # BLD AUTO: 0 10*3/MM3 (ref 0–0.2)
BASOPHILS NFR BLD AUTO: 0.6 % (ref 0–1.5)
BILIRUB SERPL-MCNC: 2.5 MG/DL (ref 0–1.2)
BUN SERPL-MCNC: 6 MG/DL (ref 6–20)
BUN/CREAT SERPL: 9.7 (ref 7–25)
CALCIUM SPEC-SCNC: 7.6 MG/DL (ref 8.6–10.5)
CHLORIDE SERPL-SCNC: 100 MMOL/L (ref 98–107)
CO2 SERPL-SCNC: 31 MMOL/L (ref 22–29)
CREAT SERPL-MCNC: 0.62 MG/DL (ref 0.57–1)
DEPRECATED RDW RBC AUTO: 63.9 FL (ref 37–54)
EOSINOPHIL # BLD AUTO: 0.1 10*3/MM3 (ref 0–0.4)
EOSINOPHIL NFR BLD AUTO: 0.8 % (ref 0.3–6.2)
ERYTHROCYTE [DISTWIDTH] IN BLOOD BY AUTOMATED COUNT: 19.2 % (ref 12.3–15.4)
GFR SERPL CREATININE-BSD FRML MDRD: 110 ML/MIN/1.73
GLOBULIN UR ELPH-MCNC: 2.6 GM/DL
GLUCOSE BLDC GLUCOMTR-MCNC: 84 MG/DL (ref 70–105)
GLUCOSE SERPL-MCNC: 86 MG/DL (ref 65–99)
HCT VFR BLD AUTO: 27 % (ref 34–46.6)
HGB BLD-MCNC: 9.1 G/DL (ref 12–15.9)
LYMPHOCYTES # BLD AUTO: 2 10*3/MM3 (ref 0.7–3.1)
LYMPHOCYTES NFR BLD AUTO: 25 % (ref 19.6–45.3)
MAGNESIUM SERPL-MCNC: 1.8 MG/DL (ref 1.6–2.6)
MCH RBC QN AUTO: 32.7 PG (ref 26.6–33)
MCHC RBC AUTO-ENTMCNC: 33.7 G/DL (ref 31.5–35.7)
MCV RBC AUTO: 97.1 FL (ref 79–97)
MONOCYTES # BLD AUTO: 0.6 10*3/MM3 (ref 0.1–0.9)
MONOCYTES NFR BLD AUTO: 7.1 % (ref 5–12)
NEUTROPHILS NFR BLD AUTO: 5.2 10*3/MM3 (ref 1.7–7)
NEUTROPHILS NFR BLD AUTO: 66.5 % (ref 42.7–76)
NRBC BLD AUTO-RTO: 0.1 /100 WBC (ref 0–0.2)
PLATELET # BLD AUTO: 87 10*3/MM3 (ref 140–450)
PMV BLD AUTO: 9 FL (ref 6–12)
POTASSIUM SERPL-SCNC: 2.9 MMOL/L (ref 3.5–5.2)
POTASSIUM SERPL-SCNC: 3.7 MMOL/L (ref 3.5–5.2)
PROT SERPL-MCNC: 5.1 G/DL (ref 6–8.5)
RBC # BLD AUTO: 2.78 10*6/MM3 (ref 3.77–5.28)
SODIUM SERPL-SCNC: 138 MMOL/L (ref 136–145)
WBC # BLD AUTO: 7.9 10*3/MM3 (ref 3.4–10.8)

## 2021-08-23 PROCEDURE — 63710000001 PREDNISONE PER 5 MG: Performed by: NURSE PRACTITIONER

## 2021-08-23 PROCEDURE — 80053 COMPREHEN METABOLIC PANEL: CPT | Performed by: INTERNAL MEDICINE

## 2021-08-23 PROCEDURE — 99232 SBSQ HOSP IP/OBS MODERATE 35: CPT | Performed by: HOSPITALIST

## 2021-08-23 PROCEDURE — 25010000002 MAGNESIUM SULFATE IN D5W 1G/100ML (PREMIX) 1-5 GM/100ML-% SOLUTION: Performed by: NURSE PRACTITIONER

## 2021-08-23 PROCEDURE — 25010000002 FUROSEMIDE PER 20 MG: Performed by: NURSE PRACTITIONER

## 2021-08-23 PROCEDURE — 84132 ASSAY OF SERUM POTASSIUM: CPT | Performed by: HOSPITALIST

## 2021-08-23 PROCEDURE — 83735 ASSAY OF MAGNESIUM: CPT | Performed by: NURSE PRACTITIONER

## 2021-08-23 PROCEDURE — 82962 GLUCOSE BLOOD TEST: CPT

## 2021-08-23 PROCEDURE — 85025 COMPLETE CBC W/AUTO DIFF WBC: CPT | Performed by: INTERNAL MEDICINE

## 2021-08-23 RX ADMIN — FOLIC ACID 1 MG: 1 TABLET ORAL at 08:00

## 2021-08-23 RX ADMIN — FUROSEMIDE 40 MG: 10 INJECTION, SOLUTION INTRAMUSCULAR; INTRAVENOUS at 05:56

## 2021-08-23 RX ADMIN — MIDODRINE HYDROCHLORIDE 10 MG: 5 TABLET ORAL at 05:55

## 2021-08-23 RX ADMIN — MAGNESIUM SULFATE IN DEXTROSE 1 G: 10 INJECTION, SOLUTION INTRAVENOUS at 07:49

## 2021-08-23 RX ADMIN — PREDNISONE 5 MG: 5 TABLET ORAL at 07:50

## 2021-08-23 RX ADMIN — Medication 100 MG: at 08:01

## 2021-08-23 RX ADMIN — PANTOPRAZOLE SODIUM 40 MG: 40 TABLET, DELAYED RELEASE ORAL at 07:50

## 2021-08-23 RX ADMIN — MIDODRINE HYDROCHLORIDE 10 MG: 5 TABLET ORAL at 15:01

## 2021-08-23 RX ADMIN — NADOLOL 20 MG: 20 TABLET ORAL at 08:00

## 2021-08-23 RX ADMIN — DIPHENHYDRAMINE HYDROCHLORIDE AND LIDOCAINE HYDROCHLORIDE AND ALUMINUM HYDROXIDE AND MAGNESIUM HYDRO 5 ML: KIT at 12:11

## 2021-08-23 RX ADMIN — DIPHENHYDRAMINE HYDROCHLORIDE AND LIDOCAINE HYDROCHLORIDE AND ALUMINUM HYDROXIDE AND MAGNESIUM HYDRO 5 ML: KIT at 18:02

## 2021-08-23 RX ADMIN — POTASSIUM CHLORIDE 40 MEQ: 1500 TABLET, EXTENDED RELEASE ORAL at 07:48

## 2021-08-23 RX ADMIN — POTASSIUM CHLORIDE 40 MEQ: 1500 TABLET, EXTENDED RELEASE ORAL at 12:11

## 2021-08-23 RX ADMIN — FUROSEMIDE 40 MG: 10 INJECTION, SOLUTION INTRAMUSCULAR; INTRAVENOUS at 21:17

## 2021-08-23 RX ADMIN — DIPHENHYDRAMINE HYDROCHLORIDE AND LIDOCAINE HYDROCHLORIDE AND ALUMINUM HYDROXIDE AND MAGNESIUM HYDRO 5 ML: KIT at 21:17

## 2021-08-23 RX ADMIN — DIPHENHYDRAMINE HYDROCHLORIDE AND LIDOCAINE HYDROCHLORIDE AND ALUMINUM HYDROXIDE AND MAGNESIUM HYDRO 5 ML: KIT at 07:50

## 2021-08-23 RX ADMIN — POTASSIUM CHLORIDE 40 MEQ: 1500 TABLET, EXTENDED RELEASE ORAL at 10:17

## 2021-08-23 RX ADMIN — PANTOPRAZOLE SODIUM 40 MG: 40 TABLET, DELAYED RELEASE ORAL at 18:02

## 2021-08-23 RX ADMIN — FUROSEMIDE 40 MG: 10 INJECTION, SOLUTION INTRAMUSCULAR; INTRAVENOUS at 15:01

## 2021-08-23 RX ADMIN — ZINC SULFATE 220 MG (50 MG) CAPSULE 220 MG: CAPSULE at 08:01

## 2021-08-23 RX ADMIN — MIDODRINE HYDROCHLORIDE 10 MG: 5 TABLET ORAL at 21:17

## 2021-08-23 RX ADMIN — Medication 10 ML: at 21:18

## 2021-08-23 RX ADMIN — Medication 10 ML: at 07:58

## 2021-08-23 RX ADMIN — NICOTINE 1 PATCH: 14 PATCH, EXTENDED RELEASE TRANSDERMAL at 08:01

## 2021-08-23 NOTE — CASE MANAGEMENT/SOCIAL WORK
Social Work Assessment  [facility]     Patient Name: Alondra Eduardo  MRN: [id number]  Today's Date: 8/23/2021    Admit Date: 8/20/2021    Discharge Plan     Row Name 08/23/21 1629       Plan    Plan Comments  See EMMANUEL note from last hospital admission, 8/11, where SW completed ETOH screen and discussed uninsured status. Last admission, all documents were submitted for Medicaid application - aside from birth certificate that as being ordered by patient's sister. [employer] to continue to follow for status of application (CONF # [id number]).     No physical contact with patient on this date.  ERWIN Guillen    Phone: [phone]  Cell: [phone]  Fax: [phone]  [email or web address]

## 2021-08-23 NOTE — PLAN OF CARE
Problem: Adult Inpatient Plan of Care  Goal: Plan of Care Review  Outcome: Ongoing, Progressing  Goal: Patient-Specific Goal (Individualized)  Outcome: Ongoing, Progressing  Goal: Absence of Hospital-Acquired Illness or Injury  Outcome: Ongoing, Progressing  Intervention: Identify and Manage Fall Risk  Recent Flowsheet Documentation  Taken 8/23/2021 0409 by Cayetano Oscar RN  Safety Promotion/Fall Prevention:   activity supervised   assistive device/personal items within reach   clutter free environment maintained   fall prevention program maintained   nonskid shoes/slippers when out of bed   room organization consistent   safety round/check completed  Taken 8/23/2021 0030 by Cayetano Oscar RN  Safety Promotion/Fall Prevention:   activity supervised   assistive device/personal items within reach   clutter free environment maintained   fall prevention program maintained   nonskid shoes/slippers when out of bed   room organization consistent   safety round/check completed  Taken 8/22/2021 2015 by Cayetano Oscar RN  Safety Promotion/Fall Prevention:   activity supervised   assistive device/personal items within reach   clutter free environment maintained   fall prevention program maintained   nonskid shoes/slippers when out of bed   room organization consistent   safety round/check completed  Intervention: Prevent Skin Injury  Recent Flowsheet Documentation  Taken 8/23/2021 0409 by Cayetano Oscar RN  Body Position: position changed independently  Skin Protection:   adhesive use limited   skin-to-device areas padded   skin-to-skin areas padded   transparent dressing maintained   tubing/devices free from skin contact  Taken 8/23/2021 0030 by Cayetano Oscar RN  Body Position: position changed independently  Skin Protection:   adhesive use limited   incontinence pads utilized   skin-to-device areas padded   skin-to-skin areas padded   transparent dressing maintained   tubing/devices free from skin  contact  Taken 8/22/2021 2015 by Cayetano Oscar RN  Body Position: position changed independently  Skin Protection:   adhesive use limited   incontinence pads utilized   skin-to-device areas padded   skin-to-skin areas padded   transparent dressing maintained   tubing/devices free from skin contact  Intervention: Prevent and Manage VTE (venous thromboembolism) Risk  Recent Flowsheet Documentation  Taken 8/23/2021 0409 by Cayetano Oscar RN  VTE Prevention/Management: patient refused intervention  Taken 8/23/2021 0030 by Cayetano Oscar RN  VTE Prevention/Management: patient refused intervention  Taken 8/22/2021 2015 by Cayetano Oscar RN  VTE Prevention/Management: patient refused intervention  Intervention: Prevent Infection  Recent Flowsheet Documentation  Taken 8/23/2021 0409 by Cayetano Oscar RN  Infection Prevention:   environmental surveillance performed   hand hygiene promoted   personal protective equipment utilized   rest/sleep promoted   single patient room provided  Taken 8/23/2021 0030 by Cayetano Oscar RN  Infection Prevention:   environmental surveillance performed   hand hygiene promoted   personal protective equipment utilized   rest/sleep promoted   single patient room provided  Taken 8/22/2021 2015 by Cayetano Oscar RN  Infection Prevention:   environmental surveillance performed   hand hygiene promoted   personal protective equipment utilized   rest/sleep promoted   single patient room provided  Goal: Optimal Comfort and Wellbeing  Outcome: Ongoing, Progressing  Intervention: Provide Person-Centered Care  Recent Flowsheet Documentation  Taken 8/23/2021 0409 by Cayetano Oscar RN  Trust Relationship/Rapport:   care explained   choices provided   emotional support provided   empathic listening provided   questions answered   questions encouraged   reassurance provided   thoughts/feelings acknowledged  Taken 8/23/2021 0030 by Cayetano Oscar RN  Trust Relationship/Rapport:   care  explained   choices provided   emotional support provided   empathic listening provided   questions answered   questions encouraged   reassurance provided   thoughts/feelings acknowledged  Taken 8/22/2021 2015 by Cayetano Oscar RN  Trust Relationship/Rapport:   care explained   choices provided   emotional support provided   empathic listening provided   questions answered   questions encouraged   reassurance provided   thoughts/feelings acknowledged  Goal: Readiness for Transition of Care  Outcome: Ongoing, Progressing     Problem: Alcohol Withdrawal  Goal: Alcohol Withdrawal Symptom Control  Outcome: Ongoing, Progressing  Intervention: Minimize/Manage Alcohol Withdrawal Symptoms  Recent Flowsheet Documentation  Taken 8/23/2021 0409 by Cayetano Oscar RN  Sensory Stimulation Regulation:   lighting decreased   quiet environment promoted  [place] Precautions:   awake/alert before oral intake   upright posture maintained  Seizure Precautions: clutter-free environment maintained  Taken 8/23/2021 0030 by Cayetano Oscar RN  Sensory Stimulation Regulation:   lighting decreased   quiet environment promoted  [place]iration Precautions:   awake/alert before oral intake   upright posture maintained  Seizure Precautions: clutter-free environment maintained  Taken 8/22/2021 2015 by Cayetano Oscar RN  Sensory Stimulation Regulation:   lighting decreased   quiet environment promoted   television on  [place]iration Precautions:   awake/alert before oral intake   upright posture maintained  Seizure Precautions: clutter-free environment maintained  Intervention: Manage Addictive Behavior (Brief Intervention)  Recent Flowsheet Documentation  Taken 8/23/2021 0409 by Cayetano Oscar RN  Supportive Measures:   active listening utilized   decision-making supported   relaxation techniques promoted   self-care encouraged  Taken 8/23/2021 0030 by Cayetano Oscar RN  Supportive Measures:   active listening utilized   decision-making  supported   relaxation techniques promoted   self-care encouraged  Taken 8/22/2021 2015 by Cayetano Oscar, RN  Supportive Measures:   active listening utilized   decision-making supported   relaxation techniques promoted   self-care encouraged   Goal Outcome Evaluation:

## 2021-08-23 NOTE — PROGRESS NOTES
[facility] Medicine Services Daily Progress Note    Patient Name: Alondra Eduardo  : 1985  MRN: [id number]  Primary Care Physician:  Provider, No Known  Date of admission: 2021      Subjective      Chief Complaint: Edema    Alondra Eduardo is a 36 y.o. female with past medical history of esophageal varices, elevated liver enzymes, hypotension, lower leg edema who presented to [facility] on 2021 complaining of bilateral lower extremity and abdominal edema.  Patient complains of occasional shortness of air and unintentional weight gain.  Patient denied chest pain, cough, nausea, vomiting, fever, chills.  When patient was discharged from [facility] for esophageal varices her diuretics were discontinued.  Her PCP recommended for her to go back to [facility] ED to evaluate and treat.     In the ED, chest x-ray showed no active cardiopulmonary disease.  All labs unremarkable except .8, calcium 8.1, total protein 5.9, ALT 69, AST 71, bilirubin 2.3, albumin 3, hemoglobin 9.8, hematocrit 29.1.  Lasix given in the ED.  Patient admitted to hospitalist group for further evaluation and treatment    2021   Patient seen and examined.  Patient has had significant improvement in symptoms and fluid status  Advised to be compliant with her 2 g sodium/ fluid restricted diet  Will replace electrolytes    2021 patient continues to slowly improve good urine output     Review of Systems   Neuro no headache no focal weakness no focal numbness  Cardiovascular no chest pain no palpitations  Respiratory no shortness of breath no cough  ROS       Objective      Vitals:   Temp:  [98.1 °F (36.7 °C)-98.9 °F (37.2 °C)] 98.1 °F (36.7 °C)  Heart Rate:  [72-98] 84  Resp:  [16-21] 16  BP: (94-99)/(46-55) 96/55    Physical Exam   Constitutional:  oriented to person, place, and time. No distress.   HENT:   Head: Normocephalic and atraumatic.   Eyes: Conjunctivae and  EOM are normal. Pupils are equal, round, and reactive to light.   Neck: No JVD present. No thyromegaly present.   Cardiovascular: Normal rate, regular rhythm, normal heart sounds and intact distal pulses. Exam reveals no gallop and no friction rub.   No murmur heard.  Pulmonary/Chest:  effort normal and breath sounds normal. No stridor. No respiratory distress.  has no wheezes.  has no rales.  exhibits no tenderness.   Abdominal: Mildly distended, nontender, bowel sounds distant but present  Musculoskeletal: Normal range of motion.  Pitting edema bilateral lower extremities  Lymphadenopathy:     no cervical adenopathy.   Neurological:  alert and oriented to person, place, and time. No cranial nerve deficit or sensory deficit. exhibits normal muscle tone.   Skin: No rash noted.  not diaphoretic.   Psychiatric:  normal mood and affect.   Vitals reviewed.      Result Review    Result Review:  I have personally reviewed the results from the time of this admission to 8/23/2021 12:48 EDT and agree with these findings:  [x]  Laboratory  [x]  Microbiology  [x]  Radiology  []  EKG/Telemetry   []  Cardiology/Vascular   []  Pathology  []  Old records  []  Other:  Most notable findings include: Hypokalemia        Assessment/Plan      Brief Patient Summary:  Alondra Eduardo is a 36 y.o. female who  is admitted for lower extremity swelling after recent discharge.  She has underlying alcohol associated hepatotoxicity.  She has responded well to diuresis.    First Mouthwash (Magic Mouthwash), 5 mL, Swish & Spit, 4x Daily AC & at Bedtime  folic acid, 1 mg, Oral, Daily  furosemide, 40 mg, Intravenous, Q8H  midodrine, 10 mg, Oral, Q8H  nadolol, 20 mg, Oral, Daily  nicotine, 1 patch, Transdermal, Q24H  pantoprazole, 40 mg, Oral, BID AC  [START ON 8/24/2021] predniSONE, 2.5 mg, Oral, Daily With Breakfast  sodium chloride, 10 mL, Intravenous, Q12H  thiamine, 100 mg, Oral, Daily  zinc sulfate, 220 mg, Oral, Daily             Active  Hospital Problems:  Active Hospital Problems    Diagnosis    • Hypocalcemia    • Diastolic CHF, acute (CMS/HCC)    • Lower leg edema    • Esophageal varices (CMS/HCC)    • Elevated liver enzymes    • Hypotension    • Tobacco use      Plan:   Anasarca  Multifactorial with liver disease and hypoalbuminemia, diastolic CHF.  With pitting lower extremity edema and distended abdomen consistent with ascites  Continue current diuretic regimen    CHF  Diastolic  Echo 8/10/2021 preserved EF  Continue current diuretic as above  2 g sodium    Liver cirrhosis  With esophageal varices and ascites  Continue spironolactone  Continue diuretic as mentioned above  Continue PPI    Elevated liver enzymes  Due to above    Hypotension  Continue midodrine,  Decreased oncotic pressure from liver disease  Tobacco use  Nicotine patch    Hypokalemia  Replace per protocol    DVT PUD prophylaxis    Plan as above      DVT prophylaxis:  Mechanical DVT prophylaxis orders are present.    CODE STATUS:    Level Of Support Discussed With: Patient  Code Status: CPR  Medical Interventions (Level of Support Prior to Arrest): Full      Disposition:  I expect patient to be discharged 8/23/2021.    Electronically signed by Damien Crockett MD, 08/23/21, 12:48 EDT.  [facility] Hospitalist Team

## 2021-08-23 NOTE — CASE MANAGEMENT/SOCIAL WORK
Discharge Planning Assessment  [facility]     Patient Name: Alondra Eduardo  MRN: [id number]  Today's Date: 8/23/2021    Admit Date: 8/20/2021    Discharge Needs Assessment     Row Name 08/23/21 1734       Living Environment    Lives With  parent(s)    Current Living Arrangements  home/apartment/condo    Primary Care Provided by  self    Provides Primary Care For  no one    Family Caregiver if Needed  parent(s)    Quality of Family Relationships  supportive    Able to Return to Prior Arrangements  yes       Resource/Environmental Concerns    Resource/Environmental Concerns  none    Transportation Concerns  car, none       Transition Planning    Patient/Family Anticipates Transition to  home with family    Patient/Family Anticipated Services at Transition  none    Transportation Anticipated  family or friend will provide       Discharge Needs Assessment    Readmission Within the Last 30 Days  no previous admission in last 30 days    Equipment Currently Used at Home  none    Concerns to be Addressed  no discharge needs identified;denies needs/concerns at this time    Anticipated Changes Related to Illness  none    Provided Post Acute Provider List?  Refused        Discharge Plan     Row Name 08/23/21 1734       Plan    Plan  DC Plan: Anticipate routine home, declined needs at this time.    Patient/Family in Agreement with Plan  yes    Plan Comments  CM discussed dc planning with patient. Reported she has been staying with her dad so he can help her out. She reported she has a PCP Irene Henriquez in [facility]. Per AVS, appt was to be scheduled with [facility]. Patient declined needs at this time.       Demographic Summary     Row Name 08/23/21 1734       General Information    Admission Type  observation    Reason for Consult  discharge planning    Preferred Language  English     Used During This Interaction  no       Contact Information    Permission Granted to Share Info With  case  manager        Functional Status     Row Name 08/23/21 1734       Functional Status    Usual Activity Tolerance  good    Current Activity Tolerance  good       Functional Status, IADL    Medications  independent    Meal Preparation  independent    Housekeeping  independent    Laundry  independent    Shopping  independent        Case Management Readmission Assessment Note       Case Management Readmission Assessment (all recorded)      Readmission Interview     Row Name 08/23/21 1728             Readmission Indications    Is this hospitalization related to the prior hospital diagnosis?  Yes      What was the reason you were admitted?  Previous hospitalization from 8/9/21-8/15/21. Patient reported she was having fluid build up.      Row Name 08/23/21 1728             Medications    Did you have newly prescribed medications at discharge?  Yes folic acid, midodrine, nadolol, pantoprazole, prednisone, thiamine, zinc sulfate      Did you understand the reasons for your medications at discharge and how to take them?  Yes      Did you understand the side effects of your medications?  Yes      Are you taking all of you prescribed medications?  Yes      [facility] Name 08/23/21 1728             Discharge Instructions    Did you understand your discharge instructions?  Yes      Were you told to eat a special diet?  Yes Low residue      Were you given a number of someone to call if you had questions or concerns?  Yes      [facility] Name 08/23/21 1728             Index discharge location/services    Where did you go upon discharge?  Home      Do you have supportive family or friends in the home?  Yes      [facility] Name 08/23/21 1728             Discharge Readiness    On a scale of 1-5 (5 being well prepared), how ready were you for discharge  4      Recommendation based on interview  Education on diagnosis/self management;Additional caregiver support;Transportation assistance;Goals of care discussion/advanced care planning;Financial assistance            LACE: 10    Phone communication or documentation only - no physical contact with patient or family.    Diamond Daniels RN     Office Phone: [phone]  Office Cell: [phone]

## 2021-08-24 LAB
ANION GAP SERPL CALCULATED.3IONS-SCNC: 6 MMOL/L (ref 5–15)
BUN SERPL-MCNC: 5 MG/DL (ref 6–20)
BUN/CREAT SERPL: 8.5 (ref 7–25)
CALCIUM SPEC-SCNC: 7.7 MG/DL (ref 8.6–10.5)
CHLORIDE SERPL-SCNC: 99 MMOL/L (ref 98–107)
CO2 SERPL-SCNC: 31 MMOL/L (ref 22–29)
CREAT SERPL-MCNC: 0.59 MG/DL (ref 0.57–1)
DEPRECATED RDW RBC AUTO: 61.3 FL (ref 37–54)
ERYTHROCYTE [DISTWIDTH] IN BLOOD BY AUTOMATED COUNT: 18.5 % (ref 12.3–15.4)
GFR SERPL CREATININE-BSD FRML MDRD: 117 ML/MIN/1.73
GLUCOSE SERPL-MCNC: 81 MG/DL (ref 65–99)
HCT VFR BLD AUTO: 28.7 % (ref 34–46.6)
HGB BLD-MCNC: 9.4 G/DL (ref 12–15.9)
MAGNESIUM SERPL-MCNC: 1.5 MG/DL (ref 1.6–2.6)
MCH RBC QN AUTO: 31.1 PG (ref 26.6–33)
MCHC RBC AUTO-ENTMCNC: 32.7 G/DL (ref 31.5–35.7)
MCV RBC AUTO: 94.9 FL (ref 79–97)
PLATELET # BLD AUTO: 99 10*3/MM3 (ref 140–450)
PMV BLD AUTO: 8.6 FL (ref 6–12)
POTASSIUM SERPL-SCNC: 3 MMOL/L (ref 3.5–5.2)
RBC # BLD AUTO: 3.03 10*6/MM3 (ref 3.77–5.28)
SODIUM SERPL-SCNC: 136 MMOL/L (ref 136–145)
WBC # BLD AUTO: 9.4 10*3/MM3 (ref 3.4–10.8)

## 2021-08-24 PROCEDURE — 25010000002 MAGNESIUM SULFATE 2 GM/50ML SOLUTION: Performed by: HOSPITALIST

## 2021-08-24 PROCEDURE — 25010000002 FUROSEMIDE PER 20 MG: Performed by: NURSE PRACTITIONER

## 2021-08-24 PROCEDURE — 25010000003 POTASSIUM CHLORIDE 10 MEQ/100ML SOLUTION: Performed by: HOSPITALIST

## 2021-08-24 PROCEDURE — 83735 ASSAY OF MAGNESIUM: CPT | Performed by: NURSE PRACTITIONER

## 2021-08-24 PROCEDURE — 80048 BASIC METABOLIC PNL TOTAL CA: CPT | Performed by: HOSPITALIST

## 2021-08-24 PROCEDURE — 36415 COLL VENOUS BLD VENIPUNCTURE: CPT | Performed by: HOSPITALIST

## 2021-08-24 PROCEDURE — 85027 COMPLETE CBC AUTOMATED: CPT | Performed by: HOSPITALIST

## 2021-08-24 PROCEDURE — P9047 ALBUMIN (HUMAN), 25%, 50ML: HCPCS | Performed by: HOSPITALIST

## 2021-08-24 PROCEDURE — 63710000001 PREDNISONE PER 5 MG: Performed by: NURSE PRACTITIONER

## 2021-08-24 PROCEDURE — 25010000003 POTASSIUM CHLORIDE 10 MEQ/100ML SOLUTION: Performed by: INTERNAL MEDICINE

## 2021-08-24 PROCEDURE — 99232 SBSQ HOSP IP/OBS MODERATE 35: CPT | Performed by: HOSPITALIST

## 2021-08-24 PROCEDURE — 25010000002 ALBUMIN HUMAN 25% PER 50 ML: Performed by: HOSPITALIST

## 2021-08-24 RX ORDER — ALBUMIN (HUMAN) 12.5 G/50ML
25 SOLUTION INTRAVENOUS ONCE
Status: COMPLETED | OUTPATIENT
Start: 2021-08-24 | End: 2021-08-24

## 2021-08-24 RX ORDER — MAGNESIUM SULFATE HEPTAHYDRATE 40 MG/ML
2 INJECTION, SOLUTION INTRAVENOUS ONCE
Status: COMPLETED | OUTPATIENT
Start: 2021-08-24 | End: 2021-08-24

## 2021-08-24 RX ORDER — POTASSIUM CHLORIDE 7.45 MG/ML
10 INJECTION INTRAVENOUS
Status: DISPENSED | OUTPATIENT
Start: 2021-08-24 | End: 2021-08-24

## 2021-08-24 RX ORDER — FUROSEMIDE 10 MG/ML
40 INJECTION INTRAMUSCULAR; INTRAVENOUS DAILY
Status: DISCONTINUED | OUTPATIENT
Start: 2021-08-25 | End: 2021-08-25 | Stop reason: HOSPADM

## 2021-08-24 RX ADMIN — Medication 10 ML: at 08:38

## 2021-08-24 RX ADMIN — POTASSIUM CHLORIDE 10 MEQ: 7.46 INJECTION, SOLUTION INTRAVENOUS at 12:09

## 2021-08-24 RX ADMIN — MIDODRINE HYDROCHLORIDE 10 MG: 5 TABLET ORAL at 21:19

## 2021-08-24 RX ADMIN — ACETAMINOPHEN 650 MG: 325 TABLET, FILM COATED ORAL at 08:44

## 2021-08-24 RX ADMIN — ALBUMIN HUMAN 25 G: 0.25 SOLUTION INTRAVENOUS at 19:10

## 2021-08-24 RX ADMIN — FUROSEMIDE 40 MG: 10 INJECTION, SOLUTION INTRAMUSCULAR; INTRAVENOUS at 05:53

## 2021-08-24 RX ADMIN — Medication 10 ML: at 21:18

## 2021-08-24 RX ADMIN — PREDNISONE 2.5 MG: 5 TABLET ORAL at 08:36

## 2021-08-24 RX ADMIN — DIPHENHYDRAMINE HYDROCHLORIDE AND LIDOCAINE HYDROCHLORIDE AND ALUMINUM HYDROXIDE AND MAGNESIUM HYDRO 5 ML: KIT at 21:18

## 2021-08-24 RX ADMIN — MIDODRINE HYDROCHLORIDE 10 MG: 5 TABLET ORAL at 05:53

## 2021-08-24 RX ADMIN — NADOLOL 20 MG: 20 TABLET ORAL at 08:35

## 2021-08-24 RX ADMIN — MIDODRINE HYDROCHLORIDE 10 MG: 5 TABLET ORAL at 12:45

## 2021-08-24 RX ADMIN — DIPHENHYDRAMINE HYDROCHLORIDE AND LIDOCAINE HYDROCHLORIDE AND ALUMINUM HYDROXIDE AND MAGNESIUM HYDRO 5 ML: KIT at 08:35

## 2021-08-24 RX ADMIN — POTASSIUM CHLORIDE 10 MEQ: 7.46 INJECTION, SOLUTION INTRAVENOUS at 10:35

## 2021-08-24 RX ADMIN — SODIUM CHLORIDE 500 ML: 9 INJECTION, SOLUTION INTRAVENOUS at 13:09

## 2021-08-24 RX ADMIN — DIPHENHYDRAMINE HYDROCHLORIDE AND LIDOCAINE HYDROCHLORIDE AND ALUMINUM HYDROXIDE AND MAGNESIUM HYDRO 5 ML: KIT at 17:28

## 2021-08-24 RX ADMIN — PANTOPRAZOLE SODIUM 40 MG: 40 TABLET, DELAYED RELEASE ORAL at 17:27

## 2021-08-24 RX ADMIN — DIPHENHYDRAMINE HYDROCHLORIDE AND LIDOCAINE HYDROCHLORIDE AND ALUMINUM HYDROXIDE AND MAGNESIUM HYDRO 5 ML: KIT at 12:10

## 2021-08-24 RX ADMIN — POTASSIUM CHLORIDE 10 MEQ: 7.46 INJECTION, SOLUTION INTRAVENOUS at 13:12

## 2021-08-24 RX ADMIN — ZINC SULFATE 220 MG (50 MG) CAPSULE 220 MG: CAPSULE at 08:35

## 2021-08-24 RX ADMIN — FOLIC ACID 1 MG: 1 TABLET ORAL at 08:35

## 2021-08-24 RX ADMIN — Medication 100 MG: at 08:36

## 2021-08-24 RX ADMIN — NICOTINE 1 PATCH: 14 PATCH, EXTENDED RELEASE TRANSDERMAL at 08:37

## 2021-08-24 RX ADMIN — POTASSIUM CHLORIDE 10 MEQ: 7.46 INJECTION, SOLUTION INTRAVENOUS at 17:25

## 2021-08-24 RX ADMIN — PANTOPRAZOLE SODIUM 40 MG: 40 TABLET, DELAYED RELEASE ORAL at 08:36

## 2021-08-24 RX ADMIN — MAGNESIUM SULFATE HEPTAHYDRATE 2 G: 2 INJECTION, SOLUTION INTRAVENOUS at 08:37

## 2021-08-24 NOTE — PROGRESS NOTES
"Heart Failure Program  Nurse Navigator  Discharge Planning    Patient Name:Alondra Eduardo  :1985  Cardiologist:   Current Admission Date: 2021   Previous Admission: 21  Admission frequency: 2 admissions in 6 months    Heart Failure history per record:    Symptoms on admission:c/o leg swelling, abd distension, weight gain, SOA since last admission. Pt recent admission [facility] 21-8/15/21, pt advises she was on a diuretic that was given to her by [facility] ER previously, \"my doctor told me to come back to the ER here because she wasn't comfortable prescribing a diuretic\".       Admission Weight:  Flowsheet Rows      First Filed Value   Admission Height  160 cm (63\") Documented at 2021   Admission Weight  87.5 kg (192 lb 14.4 oz) Documented at 2021            Current Home Medications:  Prior to Admission medications    Medication Sig Start Date End Date Taking? Authorizing Provider   [facility] (FIRST-MOUTHWASH [facility] [place]) Apply 5 mL to the mouth or throat 4 (Four) Times a Day Before Meals & at Bedtime.   Yes Provider, MD Brayan   folic acid (FOLVITE) 1 MG tablet Take 1 tablet by mouth Daily. 21  Yes Donny Harding, DO   midodrine (PROAMATINE) 10 MG tablet Take 1 tablet by mouth Every 8 (Eight) Hours. 8/15/21  Yes Donny Harding, DO   nadolol (CORGARD) 20 MG tablet Take 1 tablet by mouth Daily. 8/15/21  Yes Donny Harding, DO   pantoprazole (PROTONIX) 40 MG EC tablet Take 1 tablet by mouth 2 (Two) Times a Day Before Meals. 8/15/21  Yes Donny Harding, DO   predniSONE (DELTASONE) 20 MG tablet Take 1 tablet by mouth Daily With Breakfast. 21  Yes Donny Harding, DO   thiamine (thiamine) 100 MG tablet tablet Take 1 tablet by mouth Daily. 8/15/21  Yes Donny Harding, DO   zinc sulfate (ZINCATE) 220 (50 Zn) MG capsule Take 1 capsule by mouth Daily. 21  Yes Donny Harding, DO   furosemide (Lasix) 40 MG tablet Take 1 tablet by mouth Daily. 21   Beti Retana " "MD SANTI   predniSONE (DELTASONE) 2.5 MG tablet Take 1 tablet by mouth Daily With Breakfast for 2 doses. 8/24/21 8/26/21  Beti Retana MD   predniSONE (DELTASONE) 5 MG tablet Take 1 tablet by mouth Daily With Breakfast for 1 dose. 8/23/21 8/24/21  Beti Retana MD       Social history:   Pt lives with parents. No issues with obtaining medications or taking them, \"thye didn't give me a diuretic last time and had given me lots of IV fluids.\" pt confirms her PCP is Irene Martinez - added to chart.     Smoking status:     Diagnostics Testing:  proBNP level: [id number]    Echocardiogram:Results for orders placed during the hospital encounter of 08/09/21    Adult Transthoracic Echo Complete w/ Color, Spectral and Contrast if Necessary Per Protocol    Interpretation Summary  · Estimated left ventricular EF was in disagreement with the calculated left ventricular EF. Left ventricular ejection fraction appears to be greater than 70%. Left ventricular systolic function is normal.  · Left ventricular diastolic function was normal.  · Estimated right ventricular systolic pressure from tricuspid regurgitation is normal (<35 mmHg).        Patient Assessment:   Pt lying in bed, resp even and unlabored, noted slight nonpitting edema lower legs bilateral. Pt advises, \"the swelling is much better than when I got here.\" pt advises of SOA improved with ambulation.     Current O2: none  Home O2: none    Education provided to patient:  yes- Heart Failure disease education  yes -Symptom identification/management  yes -Daily Weights  yes- Diet education  yes- Fluid restriction (if ordered)  yes- Activity education  yes- Medication education  na- Smoking cessation  yes- Follow-up Appointments  yes-Provided information on how to access [facility] My HF Guide/Heart Failure Interactive workbook    Acceptance of learning: acceptance, cooperative, teachback    Heart Failure education interactive teaching session time: 30 minutes    Identified " needs/barriers:   Volume status improving, newer dx - review diet and fluid restriction, I&O, daily weights    Intervention:

## 2021-08-24 NOTE — PROGRESS NOTES
[facility] Medicine Services Daily Progress Note    Patient Name: Alondra Eduardo  : 1985  MRN: [id number]  Primary Care Physician:  Irene Henriquez MD  Date of admission: 2021      Subjective      Chief Complaint: Edema    Alondra Eduardo is a 36 y.o. female with past medical history of esophageal varices, elevated liver enzymes, hypotension, lower leg edema who presented to [facility] on 2021 complaining of bilateral lower extremity and abdominal edema.  Patient complains of occasional shortness of air and unintentional weight gain.  Patient denied chest pain, cough, nausea, vomiting, fever, chills.  When patient was discharged from [facility] for esophageal varices her diuretics were discontinued.  Her PCP recommended for her to go back to [facility] ED to evaluate and treat.     In the ED, chest x-ray showed no active cardiopulmonary disease.  All labs unremarkable except .8, calcium 8.1, total protein 5.9, ALT 69, AST 71, bilirubin 2.3, albumin 3, hemoglobin 9.8, hematocrit 29.1.  Lasix given in the ED.  Patient admitted to hospitalist group for further evaluation and treatment    2021   Patient seen and examined.  Patient has had significant improvement in symptoms and fluid status  Advised to be compliant with her 2 g sodium/ fluid restricted diet  Will replace electrolytes    2021 patient continues to slowly improve good urine output    2021 edema gone down ascites decreasing as well.  Good urine output     Review of Systems   Neuro no headache no focal weakness no focal numbness  Cardiovascular no chest pain no palpitations  Respiratory no shortness of breath no cough  ROS       Objective      Vitals:   Temp:  [97.6 °F (36.4 °C)-98.9 °F (37.2 °C)] 98.8 °F (37.1 °C)  Heart Rate:  [84-90] 85  Resp:  [15-21] 15  BP: ()/(40-66) 103/40    Physical Exam   Constitutional:  oriented to person, place, and time.  No distress.   HENT:   Head: Normocephalic and atraumatic.   Eyes: Conjunctivae and EOM are normal. Pupils are equal, round, and reactive to light.   Neck: No JVD present. No thyromegaly present.   Cardiovascular: Normal rate, regular rhythm, normal heart sounds and intact distal pulses. Exam reveals no gallop and no friction rub.   No murmur heard.  Pulmonary/Chest:  effort normal and breath sounds normal. No stridor. No respiratory distress.  has no wheezes.  has no rales.  exhibits no tenderness.   Abdominal: Mildly distended, nontender, bowel sounds distant but present  Musculoskeletal: Normal range of motion.  Pitting edema bilateral lower extremities  Lymphadenopathy:     no cervical adenopathy.   Neurological:  alert and oriented to person, place, and time. No cranial nerve deficit or sensory deficit. exhibits normal muscle tone.   Skin: No rash noted.  not diaphoretic.   Psychiatric:  normal mood and affect.   Vitals reviewed.      Result Review    Result Review:  I have personally reviewed the results from the time of this admission to 8/24/2021 10:49 EDT and agree with these findings:  [x]  Laboratory  [x]  Microbiology  [x]  Radiology  []  EKG/Telemetry   []  Cardiology/Vascular   []  Pathology  []  Old records  []  Other:  Most notable findings include: Hypokalemia        Assessment/Plan      Brief Patient Summary:  Alondra Eduardo is a 36 y.o. female who  is admitted for lower extremity swelling after recent discharge.  She has underlying alcohol associated hepatotoxicity.  She has responded well to diuresis.    First Mouthwash (Magic Mouthwash), 5 mL, Swish & Spit, 4x Daily AC & at Bedtime  folic acid, 1 mg, Oral, Daily  [START ON 8/25/2021] furosemide, 40 mg, Intravenous, Daily  midodrine, 10 mg, Oral, Q8H  nadolol, 20 mg, Oral, Daily  nicotine, 1 patch, Transdermal, Q24H  pantoprazole, 40 mg, Oral, BID AC  potassium chloride, 10 mEq, Intravenous, Q1H  predniSONE, 2.5 mg, Oral, Daily With  Breakfast  sodium chloride, 10 mL, Intravenous, Q12H  thiamine, 100 mg, Oral, Daily  zinc sulfate, 220 mg, Oral, Daily             Active Hospital Problems:  Active Hospital Problems    Diagnosis    • Abdominal distention    • Hypocalcemia    • Diastolic CHF, acute (CMS/HCC)    • Lower leg edema    • Esophageal varices (CMS/HCC)    • Elevated liver enzymes    • Hypotension    • Tobacco use      Plan:   Anasarca  Multifactorial with liver disease and hypoalbuminemia, diastolic CHF.  With pitting lower extremity edema and distended abdomen consistent with ascites  Start de-escalating diuretics today    CHF  Diastolic  Echo 8/10/2021 preserved EF  Continue current diuretic as above  2 g sodium    Liver cirrhosis  With esophageal varices and ascites  Continue spironolactone  Continue diuretic as mentioned above  Continue PPI    Elevated liver enzymes  Due to above    Hypotension  Continue midodrine,  Decreased oncotic pressure from liver disease    Tobacco use  Nicotine patch    Hypokalemia/hypomagnesemia  Replace per protocol-IV    DVT PUD prophylaxis    Plan as above      DVT prophylaxis:  Mechanical DVT prophylaxis orders are present.    CODE STATUS:    Level Of Support Discussed With: Patient  Code Status: CPR  Medical Interventions (Level of Support Prior to Arrest): Full      Disposition:  I expect patient to be discharged 8/23/2021.    Electronically signed by Damien Crockett MD, 08/24/21, 10:49 EDT.  [facility] Hospitalist Team

## 2021-08-24 NOTE — PLAN OF CARE
Goal Outcome Evaluation:  Plan of Care Reviewed With: patient        Progress: improving  Outcome Summary: Pt cont. to have intermittant low BP. Midodrine continued as well as nadolol. Symptomatic hypotension this AM. 500cc bolus and albumin given. BP has slightly increased w/ resolved symptoms at this time. Pt remains on RA. Swelling is greatly improved. Anticipate home on DC.

## 2021-08-24 NOTE — SIGNIFICANT NOTE
Pt Dc'd to [facility] via ambulance. Report given to Cailin. Mother at bedside at time of departure. Mother took all belongings with her.

## 2021-08-24 NOTE — CASE MANAGEMENT/SOCIAL WORK
Continued Stay Note   Pj     Patient Name: Alondra Eduardo  MRN: [id number]  Today's Date: 8/24/2021    Admit Date: 8/20/2021    Discharge Plan     Row Name 08/24/21 1327       Plan    Plan  anticipate routine home    Plan Comments  DC barriers: on iv lasix, hypotension, on midodrine        Expected Discharge Date and Time     Expected Discharge Date Expected Discharge Time    Aug 26, 2021         Phone communication or documentation only - no physical contact with patient or family.      Carina Woodruff RN

## 2021-08-25 VITALS
BODY MASS INDEX: 32.19 KG/M2 | SYSTOLIC BLOOD PRESSURE: 105 MMHG | WEIGHT: 181.66 LBS | OXYGEN SATURATION: 98 % | TEMPERATURE: 98.5 F | RESPIRATION RATE: 22 BRPM | HEIGHT: 63 IN | HEART RATE: 82 BPM | DIASTOLIC BLOOD PRESSURE: 64 MMHG

## 2021-08-25 LAB
ANION GAP SERPL CALCULATED.3IONS-SCNC: 6 MMOL/L (ref 5–15)
BUN SERPL-MCNC: 5 MG/DL (ref 6–20)
BUN/CREAT SERPL: 9.1 (ref 7–25)
CALCIUM SPEC-SCNC: 7.7 MG/DL (ref 8.6–10.5)
CHLORIDE SERPL-SCNC: 100 MMOL/L (ref 98–107)
CO2 SERPL-SCNC: 29 MMOL/L (ref 22–29)
CREAT SERPL-MCNC: 0.55 MG/DL (ref 0.57–1)
DEPRECATED RDW RBC AUTO: 61.7 FL (ref 37–54)
ERYTHROCYTE [DISTWIDTH] IN BLOOD BY AUTOMATED COUNT: 18.7 % (ref 12.3–15.4)
GFR SERPL CREATININE-BSD FRML MDRD: 127 ML/MIN/1.73
GLUCOSE SERPL-MCNC: 76 MG/DL (ref 65–99)
HCT VFR BLD AUTO: 26.4 % (ref 34–46.6)
HGB BLD-MCNC: 8.8 G/DL (ref 12–15.9)
MAGNESIUM SERPL-MCNC: 1.7 MG/DL (ref 1.6–2.6)
MCH RBC QN AUTO: 32.1 PG (ref 26.6–33)
MCHC RBC AUTO-ENTMCNC: 33.4 G/DL (ref 31.5–35.7)
MCV RBC AUTO: 96.2 FL (ref 79–97)
NT-PROBNP SERPL-MCNC: 454.5 PG/ML (ref 0–450)
PLATELET # BLD AUTO: 86 10*3/MM3 (ref 140–450)
PMV BLD AUTO: 8.8 FL (ref 6–12)
POTASSIUM SERPL-SCNC: 3.1 MMOL/L (ref 3.5–5.2)
RBC # BLD AUTO: 2.75 10*6/MM3 (ref 3.77–5.28)
SODIUM SERPL-SCNC: 135 MMOL/L (ref 136–145)
WBC # BLD AUTO: 6.3 10*3/MM3 (ref 3.4–10.8)

## 2021-08-25 PROCEDURE — 80048 BASIC METABOLIC PNL TOTAL CA: CPT | Performed by: HOSPITALIST

## 2021-08-25 PROCEDURE — 83735 ASSAY OF MAGNESIUM: CPT | Performed by: NURSE PRACTITIONER

## 2021-08-25 PROCEDURE — 25010000002 FUROSEMIDE PER 20 MG: Performed by: HOSPITALIST

## 2021-08-25 PROCEDURE — 99239 HOSP IP/OBS DSCHRG MGMT >30: CPT | Performed by: HOSPITALIST

## 2021-08-25 PROCEDURE — 36415 COLL VENOUS BLD VENIPUNCTURE: CPT | Performed by: HOSPITALIST

## 2021-08-25 PROCEDURE — 25010000003 POTASSIUM CHLORIDE 10 MEQ/100ML SOLUTION: Performed by: HOSPITALIST

## 2021-08-25 PROCEDURE — 85027 COMPLETE CBC AUTOMATED: CPT | Performed by: HOSPITALIST

## 2021-08-25 PROCEDURE — 25010000002 MAGNESIUM SULFATE IN D5W 1G/100ML (PREMIX) 1-5 GM/100ML-% SOLUTION: Performed by: NURSE PRACTITIONER

## 2021-08-25 PROCEDURE — 83880 ASSAY OF NATRIURETIC PEPTIDE: CPT | Performed by: NURSE PRACTITIONER

## 2021-08-25 PROCEDURE — 63710000001 PREDNISONE PER 5 MG: Performed by: NURSE PRACTITIONER

## 2021-08-25 RX ORDER — POTASSIUM CHLORIDE 20 MEQ/1
40 TABLET, EXTENDED RELEASE ORAL ONCE
Status: COMPLETED | OUTPATIENT
Start: 2021-08-25 | End: 2021-08-25

## 2021-08-25 RX ORDER — POTASSIUM CHLORIDE 7.45 MG/ML
10 INJECTION INTRAVENOUS
Status: DISCONTINUED | OUTPATIENT
Start: 2021-08-25 | End: 2021-08-25

## 2021-08-25 RX ORDER — SPIRONOLACTONE 25 MG/1
25 TABLET ORAL DAILY
Qty: 5 TABLET | Refills: 0 | Status: SHIPPED | OUTPATIENT
Start: 2021-08-25 | End: 2021-10-03

## 2021-08-25 RX ADMIN — POTASSIUM CHLORIDE 40 MEQ: 1500 TABLET, EXTENDED RELEASE ORAL at 08:53

## 2021-08-25 RX ADMIN — DIPHENHYDRAMINE HYDROCHLORIDE AND LIDOCAINE HYDROCHLORIDE AND ALUMINUM HYDROXIDE AND MAGNESIUM HYDRO 5 ML: KIT at 08:52

## 2021-08-25 RX ADMIN — MAGNESIUM SULFATE IN DEXTROSE 1 G: 10 INJECTION, SOLUTION INTRAVENOUS at 05:44

## 2021-08-25 RX ADMIN — DIPHENHYDRAMINE HYDROCHLORIDE AND LIDOCAINE HYDROCHLORIDE AND ALUMINUM HYDROXIDE AND MAGNESIUM HYDRO 5 ML: KIT at 11:17

## 2021-08-25 RX ADMIN — Medication 100 MG: at 08:52

## 2021-08-25 RX ADMIN — POTASSIUM CHLORIDE 10 MEQ: 7.46 INJECTION, SOLUTION INTRAVENOUS at 08:54

## 2021-08-25 RX ADMIN — POTASSIUM CHLORIDE 40 MEQ: 1500 TABLET, EXTENDED RELEASE ORAL at 11:17

## 2021-08-25 RX ADMIN — MIDODRINE HYDROCHLORIDE 10 MG: 5 TABLET ORAL at 13:20

## 2021-08-25 RX ADMIN — PANTOPRAZOLE SODIUM 40 MG: 40 TABLET, DELAYED RELEASE ORAL at 08:53

## 2021-08-25 RX ADMIN — FOLIC ACID 1 MG: 1 TABLET ORAL at 08:53

## 2021-08-25 RX ADMIN — POTASSIUM CHLORIDE 40 MEQ: 1500 TABLET, EXTENDED RELEASE ORAL at 05:44

## 2021-08-25 RX ADMIN — NICOTINE 1 PATCH: 14 PATCH, EXTENDED RELEASE TRANSDERMAL at 08:52

## 2021-08-25 RX ADMIN — MIDODRINE HYDROCHLORIDE 10 MG: 5 TABLET ORAL at 05:44

## 2021-08-25 RX ADMIN — PREDNISONE 2.5 MG: 5 TABLET ORAL at 08:53

## 2021-08-25 RX ADMIN — FUROSEMIDE 40 MG: 10 INJECTION, SOLUTION INTRAMUSCULAR; INTRAVENOUS at 08:53

## 2021-08-25 RX ADMIN — POTASSIUM CHLORIDE 10 MEQ: 7.46 INJECTION, SOLUTION INTRAVENOUS at 10:07

## 2021-08-25 RX ADMIN — Medication 10 ML: at 08:51

## 2021-08-25 RX ADMIN — ZINC SULFATE 220 MG (50 MG) CAPSULE 220 MG: CAPSULE at 08:53

## 2021-08-25 NOTE — DISCHARGE SUMMARY
[facility] Medicine Services  DISCHARGE SUMMARY    Patient Name: Alondra Eduardo  : 1985  MRN: [id number]    Date of Admission: 2021  Date of Discharge: 2021  Primary Care Physician: Irene Henriquez MD      Presenting Problem:   Abdominal distention [R14.0]  Lower leg edema [R60.0]  Bilateral lower extremity edema [R60.0]    Active and Resolved Hospital Problems:  Active Hospital Problems    Diagnosis POA   • Abdominal distention [R14.0] Yes   • Hypocalcemia [E83.51] Yes   • Diastolic CHF, acute (CMS/HCC) [I50.31] Yes   • Lower leg edema [R60.0] Yes   • Esophageal varices (CMS/HCC) [I85.00] Yes   • Elevated liver enzymes [R74.8] Yes   • Hypotension [I95.9] Yes   • Tobacco use [Z72.0] Yes      Resolved Hospital Problems   No resolved problems to display.         Hospital Course     Hospital Course:  Chief Complaint: Edema     History of Present Illness: Alondra Eduardo is a 36 y.o. female with past medical history of esophageal varices, elevated liver enzymes, hypotension, lower leg edema who presented to [facility] on 2021 complaining of bilateral lower extremity and abdominal edema.  Patient complains of occasional shortness of air and unintentional weight gain.  Patient denied chest pain, cough, nausea, vomiting, fever, chills.  When patient was discharged from [facility] for esophageal varices her diuretics were discontinued.  Her PCP recommended for her to go back to [facility] ED to evaluate and treat.     In the ED, chest x-ray showed no active cardiopulmonary disease.  All labs unremarkable except .8, calcium 8.1, total protein 5.9, ALT 69, AST 71, bilirubin 2.3, albumin 3, hemoglobin 9.8, hematocrit 29.1.  Lasix given in the ED.  Patient admitted to hospitalist group for further evaluation and treatment    Hospital course and problem list    Anasarca  Multifactorial with liver disease and hypoalbuminemia,  diastolic CHF.  With pitting lower extremity edema and distended abdomen consistent with ascites which is now resolved  He was diuresed with IV Lasix 3 times daily and de-escalate it to once a day and now to p.o. on discharge.  We will add spironolactone.     CHF  Diastolic  Echo 8/10/2021 preserved EF  Continue current diuretic as above  2 g sodium     Liver cirrhosis  With esophageal varices and ascites  Continue spironolactone  Continue diuretic as mentioned above  Continue PPI  Follow-up with GI     Elevated liver enzymes  Due to above     Hypotension  Continue midodrine,  Decreased oncotic pressure from liver disease     Tobacco use  Nicotine patch     Hypokalemia/hypomagnesemia  Replaced per protocol-IV n.p.o.     DVT PUD prophylaxis     Plan and overall much improved.  Discharge home stable condition follow-up closely with GI and PCP as an outpatient.      Reasons For Change In Medications and Indications for New Medications:      Day of Discharge     Vital Signs:  Temp:  [98.1 °F (36.7 °C)-98.9 °F (37.2 °C)] 98.1 °F (36.7 °C)  Heart Rate:  [62-85] 83  Resp:  [20-23] 20  BP: (81-99)/(31-59) 99/54    Physical Exam:  Physical Exam   Constitutional:  oriented to person, place, and time. No distress.   HENT:   Head: Normocephalic and atraumatic.   Eyes: Conjunctivae and EOM are normal. Pupils are equal, round, and reactive to light.   Neck: No JVD present. No thyromegaly present.   Cardiovascular: Normal rate, regular rhythm, normal heart sounds and intact distal pulses. Exam reveals no gallop and no friction rub.   No murmur heard.  Pulmonary/Chest: Effort normal and breath sounds normal. No stridor. No respiratory distress.  has no wheezes.  has no rales.  exhibits no tenderness.   Abdominal: Soft. Bowel sounds are normal.  no distension and no mass. There is no tenderness. There is no rebound and no guarding. No hernia.   Musculoskeletal: Normal range of motion.   Lymphadenopathy:     no cervical adenopathy.    Neurological:  alert and oriented to person, place, and time. No cranial nerve deficit or sensory deficit. exhibits normal muscle tone.   Skin: No rash noted.  not diaphoretic.   Psychiatric:  normal mood and affect.   Vitals reviewed.        Pertinent  and/or Most Recent Results     LAB RESULTS:      Lab 08/25/21  0427 08/24/21  0241 08/23/21  0444 08/22/21  0600 08/21/21  0608 08/20/21 2116 08/20/21 2116   WBC 6.30 9.40 7.90 10.60 8.60   < > 8.80   HEMOGLOBIN 8.8* 9.4* 9.1* 9.1* 10.1*   < > 9.8*   HEMATOCRIT 26.4* 28.7* 27.0* 26.6* 31.0*   < > 29.1*   PLATELETS 86* 99* 87* 90* 87*   < > 94*   NEUTROS ABS  --   --  5.20 7.10* 5.50  --  6.40   LYMPHS ABS  --   --  2.00 2.70 2.40  --  1.70   MONOS ABS  --   --  0.60 0.70 0.60  --  0.60   EOS ABS  --   --  0.10 0.10 0.00  --  0.00   MCV 96.2 94.9 97.1* 95.7 96.6   < > 96.4    < > = values in this interval not displayed.         Lab 08/25/21 0427 08/24/21 0241 08/23/21  1559 08/23/21  0444 08/22/21  1401 08/22/21  0600 08/22/21  0600 08/21/21  0608 08/21/21  0608 08/20/21 2116 08/20/21 2116   SODIUM 135* 136  --  138 136  --  135*   < > 140   < > 136   POTASSIUM 3.1* 3.0* 3.7 2.9* 3.1*   < > 2.9*   < > 3.8   < > 3.6   CHLORIDE 100 99  --  100 97*  --  98   < > 105   < > 104   CO2 29.0 31.0*  --  31.0* 31.0*  --  30.0*   < > 28.0   < > 27.0   ANION GAP 6.0 6.0  --  7.0 8.0  --  7.0   < > 7.0   < > 5.0   BUN 5* 5*  --  6 6  --  8   < > 8   < > 9   CREATININE 0.55* 0.59  --  0.62 0.78  --  0.63   < > 0.66   < > 0.64   GLUCOSE 76 81  --  86 156*  --  82   < > 78   < > 93   CALCIUM 7.7* 7.7*  --  7.6* 8.3*  --  7.9*   < > 8.2*   < > 8.1*   IONIZED CALCIUM  --   --   --   --   --   --   --   --  1.16*  --   --    MAGNESIUM 1.7 1.5*  --  1.8  --   --  1.5*  --   --   --   --    TSH  --   --   --   --   --   --   --   --   --   --  0.838    < > = values in this interval not displayed.         Lab 08/23/21  0444 08/22/21  0600 08/21/21  0608 08/20/21  1376   TOTAL  PROTEIN 5.1* 5.3* 5.6* 5.9*   ALBUMIN 2.50* 2.50* 2.90* 3.00*   GLOBULIN 2.6 2.8 2.7 2.9   ALT (SGPT) 59* 60* 64* 69*   AST (SGOT) 63* 58* 70* 71*   BILIRUBIN 2.5* 2.5* 2.8* 2.3*   ALK PHOS 96 90 88 110         Lab 08/25/21  0427 08/21/21  0608 08/20/21  2116   PROBNP 454.5* 514.0* 641.8*         Lab 08/21/21  0608   CHOLESTEROL 76   LDL CHOL 37   HDL CHOL 25*   TRIGLYCERIDES 60             Brief Urine Lab Results  (Last result in the past 365 days)      Color   Clarity   Blood   Leuk Est   Nitrite   Protein   CREAT   Urine HCG        08/10/21 1827               Negative         Microbiology Results (last 10 days)     Procedure Component Value - Date/Time    COVID PRE-OP / PRE-PROCEDURE SCREENING ORDER (NO ISOLATION) - Swab, Nasopharynx [[id number]]  (Normal) Collected: 08/21/21 1330    Lab Status: Final result Specimen: Swab from Nasopharynx Updated: 08/21/21 1356    Narrative:      The following orders were created for panel order COVID PRE-OP / PRE-PROCEDURE SCREENING ORDER (NO ISOLATION) - Swab, Nasopharynx.  Procedure                               Abnormality         Status                     ---------                               -----------         ------                     COVID-19,CEPHEID/JOSÉ MIGUEL/BD...[[id number]]  Normal              Final result                 Please view results for these tests on the individual orders.    COVID-19,CEPHEID/JOSÉ MIGUEL/BDMAX,COR/PATIENCE/PAD/MARSHALL IN-HOUSE(OR EMERGENT/ADD-ON),NP SWAB IN TRANSPORT MEDIA 3-4 HR TAT, RT-PCR - Swab, Nasopharynx [[id number]]  (Normal) Collected: 08/21/21 1330    Lab Status: Final result Specimen: Swab from Nasopharynx Updated: 08/21/21 1356     COVID19 Not Detected    Narrative:      Fact sheet for providers: https://www.fda.gov/media/481040/download     Fact sheet for patients: https://www.fda.gov/media/368354/download  Fact sheet for providers: https://www.fda.gov/media/468364/download    Fact sheet for patients:  https://www.fda.gov/media/766065/download    Test performed by PCR.          XR Chest 1 View    Result Date: 8/20/2021  Impression:  IMPRESSION: No active cardiopulmonary disease.   Electronically Signed By-Dioni Quintero DO On:8/20/2021 9:45 PM This report was finalized on [id number] by  Dioni Quintero DO.    XR Chest 1 View    Result Date: 8/12/2021  Impression: Diffuse airspace opacities with probable small pleural effusion. This is developed since previous study. Pulmonary edema versus ARDS or multifocal pneumonia. PICC line has appropriate position.  Electronically Signed By-Fiorella Bundy MD On:8/12/2021 8:44 AM This report was finalized on [id number] by  Fiorella Bundy MD.    XR Chest 1 View    Result Date: 8/9/2021  Impression: Low lung volumes. No acute cardiopulmonary abnormality.  Electronically Signed By-Fiorella Bundy MD On:8/9/2021 7:49 PM This report was finalized on [id number] by  Fiorella Bundy MD.    XR Chest 1 View    Result Date: 8/9/2021  Impression: No acute cardiopulmonary abnormality.  Electronically Signed By-Fiorella Bundy MD On:8/9/2021 7:12 PM This report was finalized on [id number] by  Fiorella Bundy MD.    US Renal Bilateral    Result Date: 8/11/2021  Impression:  1.  Moderate ascites incidentally noted. 2.  Normal kidneys.  Electronically signed by:  Gerson Fall M.D.  8/11/2021 3:39 AM              Results for orders placed during the hospital encounter of 08/09/21    Adult Transthoracic Echo Complete w/ Color, Spectral and Contrast if Necessary Per Protocol    Interpretation Summary  · Estimated left ventricular EF was in disagreement with the calculated left ventricular EF. Left ventricular ejection fraction appears to be greater than 70%. Left ventricular systolic function is normal.  · Left ventricular diastolic function was normal.  · Estimated right ventricular systolic pressure from tricuspid regurgitation is normal (<35 mmHg).      Labs Pending at Discharge:      Procedures  Performed           Consults:   Consults     Date and Time Order Name Status Description    8/20/2021  9:53 PM Hospitalist (on-call MD unless specified) Completed     8/13/2021  6:41 PM Inpatient Hospitalist Consult Completed     8/10/2021  9:47 AM Inpatient Nephrology Consult Completed     8/9/2021  6:28 PM Gastroenterology (on-call MD unless specified) Completed     8/9/2021  6:25 PM Intensivist (on-call MD unless specified) Completed             Discharge Details        Discharge Medications      New Medications      Instructions Start Date   furosemide 40 MG tablet  Commonly known as: Lasix   40 mg, Oral, Daily      spironolactone 25 MG tablet  Commonly known as: Aldactone   25 mg, Oral, Daily         Changes to Medications      Instructions Start Date   predniSONE 2.5 MG tablet  Commonly known as: DELTASONE  What changed:   · medication strength  · how much to take   2.5 mg, Oral, Daily With Breakfast         Continue These Medications      Instructions Start Date   FIRST-MOUTHWASH [facility] MT   5 mL, Mouth/Throat, 4 Times Daily Before Meals & Nightly      folic acid 1 MG tablet  Commonly known as: FOLVITE   1 mg, Oral, Daily      midodrine 10 MG tablet  Commonly known as: PROAMATINE   10 mg, Oral, Every 8 Hours Scheduled      nadolol 20 MG tablet  Commonly known as: CORGARD   20 mg, Oral, Daily      pantoprazole 40 MG EC tablet  Commonly known as: PROTONIX   40 mg, Oral, 2 Times Daily Before Meals      thiamine 100 MG tablet tablet  Commonly known as: VITAMIN B-1   100 mg, Oral, Daily      zinc sulfate 220 (50 Zn) MG capsule  Commonly known as: ZINCATE   220 mg, Oral, Daily         ASK your doctor about these medications      Instructions Start Date   predniSONE 5 MG tablet  Commonly known as: DELTASONE  Ask about: Should I take this medication?   5 mg, Oral, Daily With Breakfast             No Known Allergies      Discharge Disposition: Discharge home stable condition  Home or Self Care    Diet:  Hospital:  Diet  Order   Procedures   • Diet Cardiac; 2gm Na+         Discharge Activity:   Activity Instructions     Activity as Tolerated              CODE STATUS:  Code Status and Medical Interventions:   Ordered at: 08/21/21 0442     Level Of Support Discussed With:    Patient     Code Status:    CPR     Medical Interventions (Level of Support Prior to Arrest):    Full         No future appointments.    Additional Instructions for the Follow-ups that You Need to Schedule     Discharge Follow-up with PCP   As directed       Currently Documented PCP:    Irene Henriquez MD    PCP Phone Number:    [phone]     Follow Up Details: one week         Discharge Follow-up with Specialty: GI one week   As directed      Specialty: GI one week               Time spent on Discharge including face to face service: 38 minutes    Signature:   Electronically signed by Damien Crockett MD, 08/25/21, 12:18 PM EDT.

## 2021-08-25 NOTE — PLAN OF CARE
Goal Outcome Evaluation:  Plan of Care Reviewed With: patient        Progress: improving   Patient alert and oriented, continues on room air, BP up and down, midodrine given, patient denies any symptoms of low BP, patient resting well this shift.

## 2021-08-26 ENCOUNTER — READMISSION MANAGEMENT (OUTPATIENT)
Dept: CALL CENTER | Facility: HOSPITAL | Age: 36
End: 2021-08-26

## 2021-08-26 NOTE — OUTREACH NOTE
Prep Survey      Responses   [facility]?  [facility]   Is LACE score < 7 ?  No   Emergency Room discharge w/ pulse ox?  No   Eligibility  Readm Mgmt   Discharge diagnosis  Abdominal distention    Does the patient have one of the following disease processes/diagnoses(primary or secondary)?  Other   Does the patient have Home health ordered?  No   Is there a DME ordered?  No   Medication alerts for this patient  Lasix, Aldactone    Prep survey completed?  Yes          Jewels Cobb RN

## 2021-08-26 NOTE — CASE MANAGEMENT/SOCIAL WORK
Case Management Discharge Note      Final Note: home    Provided Post Acute Provider List?: Refused    Selected Continued Care - Discharged on 8/25/2021 Admission date: 8/20/2021 - Discharge disposition: Home or Self Care        Final Discharge Disposition Code: 01 - home or self-care

## 2021-08-27 ENCOUNTER — READMISSION MANAGEMENT (OUTPATIENT)
Dept: CALL CENTER | Facility: HOSPITAL | Age: 36
End: 2021-08-27

## 2021-08-27 NOTE — OUTREACH NOTE
Medical Week 1 Survey      Responses   [facility] patient discharged from?  [facility]   Does the patient have one of the following disease processes/diagnoses(primary or secondary)?  Other   Week 1 attempt successful?  No   Unsuccessful attempts  Attempt 2          Alisa Diaz LPN

## 2021-09-07 ENCOUNTER — READMISSION MANAGEMENT (OUTPATIENT)
Dept: CALL CENTER | Facility: HOSPITAL | Age: 36
End: 2021-09-07

## 2021-09-07 NOTE — OUTREACH NOTE
Medical Week 2 Survey      Responses   [facility] patient discharged from?  [facility]   Does the patient have one of the following disease processes/diagnoses(primary or secondary)?  Other   Week 2 attempt successful?  No   Unsuccessful attempts  Attempt 1          Chelita Tang LPN

## 2021-09-08 ENCOUNTER — READMISSION MANAGEMENT (OUTPATIENT)
Dept: CALL CENTER | Facility: HOSPITAL | Age: 36
End: 2021-09-08

## 2021-09-08 NOTE — OUTREACH NOTE
Medical Week 2 Survey      Responses   [facility] patient discharged from?  [facility]   Does the patient have one of the following disease processes/diagnoses(primary or secondary)?  Other   Week 2 attempt successful?  No   Unsuccessful attempts  Attempt 2          Patience Beck RN

## 2021-09-15 ENCOUNTER — READMISSION MANAGEMENT (OUTPATIENT)
Dept: CALL CENTER | Facility: HOSPITAL | Age: 36
End: 2021-09-15

## 2021-09-15 NOTE — OUTREACH NOTE
Medical Week 3 Survey      Responses   [facility] patient discharged from?  [facility]   Does the patient have one of the following disease processes/diagnoses(primary or secondary)?  Other   Week 3 attempt successful?  No   Unsuccessful attempts  Attempt 1          Chelita Tang LPN

## 2021-09-30 ENCOUNTER — OFFICE (OUTPATIENT)
Dept: URBAN - METROPOLITAN AREA CLINIC 64 | Facility: CLINIC | Age: 36
End: 2021-09-30
Payer: COMMERCIAL

## 2021-09-30 VITALS
SYSTOLIC BLOOD PRESSURE: 110 MMHG | HEART RATE: 81 BPM | WEIGHT: 144 LBS | HEIGHT: 63 IN | DIASTOLIC BLOOD PRESSURE: 66 MMHG

## 2021-09-30 DIAGNOSIS — I85.01 ESOPHAGEAL VARICES WITH BLEEDING: ICD-10-CM

## 2021-09-30 DIAGNOSIS — K92.0 HEMATEMESIS: ICD-10-CM

## 2021-09-30 PROCEDURE — 99214 OFFICE O/P EST MOD 30 MIN: CPT | Performed by: NURSE PRACTITIONER

## 2021-10-03 ENCOUNTER — ANESTHESIA (OUTPATIENT)
Dept: GASTROENTEROLOGY | Facility: HOSPITAL | Age: 36
End: 2021-10-03

## 2021-10-03 ENCOUNTER — HOSPITAL ENCOUNTER (INPATIENT)
Facility: HOSPITAL | Age: 36
LOS: 3 days | Discharge: HOME | End: 2021-10-06
Attending: EMERGENCY MEDICINE | Admitting: INTERNAL MEDICINE

## 2021-10-03 ENCOUNTER — INPATIENT HOSPITAL (OUTPATIENT)
Dept: URBAN - METROPOLITAN AREA HOSPITAL 84 | Facility: HOSPITAL | Age: 36
End: 2021-10-03
Payer: COMMERCIAL

## 2021-10-03 ENCOUNTER — ANESTHESIA EVENT (OUTPATIENT)
Dept: GASTROENTEROLOGY | Facility: HOSPITAL | Age: 36
End: 2021-10-03

## 2021-10-03 DIAGNOSIS — I85.01 ESOPHAGEAL VARICES WITH BLEEDING: ICD-10-CM

## 2021-10-03 DIAGNOSIS — K70.10 ALCOHOLIC HEPATITIS WITHOUT ASCITES: ICD-10-CM

## 2021-10-03 DIAGNOSIS — B18.2 CHRONIC VIRAL HEPATITIS C: ICD-10-CM

## 2021-10-03 DIAGNOSIS — K92.0 HEMATEMESIS: ICD-10-CM

## 2021-10-03 DIAGNOSIS — K92.2 ACUTE UPPER GI BLEED: ICD-10-CM

## 2021-10-03 DIAGNOSIS — F10.188 ALCOHOL ABUSE WITH OTHER ALCOHOL-INDUCED DISORDER: ICD-10-CM

## 2021-10-03 DIAGNOSIS — K92.2 GASTROINTESTINAL HEMORRHAGE, UNSPECIFIED GASTROINTESTINAL HEMORRHAGE TYPE: Primary | ICD-10-CM

## 2021-10-03 PROBLEM — E87.6 HYPOKALEMIA: Status: ACTIVE | Noted: 2021-10-03

## 2021-10-03 LAB
ABO GROUP BLD: NORMAL
ALBUMIN SERPL-MCNC: 2.7 G/DL (ref 3.5–5.2)
ALBUMIN/GLOB SERPL: 0.8 G/DL
ALP SERPL-CCNC: 105 U/L (ref 39–117)
ALT SERPL W P-5'-P-CCNC: 25 U/L (ref 1–33)
ANION GAP SERPL CALCULATED.3IONS-SCNC: 8 MMOL/L (ref 5–15)
ANTI-E: NORMAL
APTT PPP: 34.6 SECONDS (ref 24–31)
AST SERPL-CCNC: 52 U/L (ref 1–32)
BASOPHILS # BLD AUTO: 0.1 10*3/MM3 (ref 0–0.2)
BASOPHILS NFR BLD AUTO: 1.4 % (ref 0–1.5)
BILIRUB SERPL-MCNC: 2.1 MG/DL (ref 0–1.2)
BLD GP AB SCN SERPL QL: POSITIVE
BUN SERPL-MCNC: 7 MG/DL (ref 6–20)
BUN/CREAT SERPL: 13 (ref 7–25)
CALCIUM SPEC-SCNC: 8 MG/DL (ref 8.6–10.5)
CHLORIDE SERPL-SCNC: 103 MMOL/L (ref 98–107)
CO2 SERPL-SCNC: 25 MMOL/L (ref 22–29)
CREAT SERPL-MCNC: 0.54 MG/DL (ref 0.57–1)
DEPRECATED RDW RBC AUTO: 58.6 FL (ref 37–54)
E AG RBC QL: NEGATIVE
EOSINOPHIL # BLD AUTO: 0.1 10*3/MM3 (ref 0–0.4)
EOSINOPHIL NFR BLD AUTO: 1.7 % (ref 0.3–6.2)
ERYTHROCYTE [DISTWIDTH] IN BLOOD BY AUTOMATED COUNT: 18.4 % (ref 12.3–15.4)
ETHANOL UR QL: <0.01 %
GFR SERPL CREATININE-BSD FRML MDRD: 129 ML/MIN/1.73
GLOBULIN UR ELPH-MCNC: 3.2 GM/DL
GLUCOSE SERPL-MCNC: 116 MG/DL (ref 65–99)
HCT VFR BLD AUTO: 26.3 % (ref 34–46.6)
HCT VFR BLD AUTO: 28.9 % (ref 34–46.6)
HGB BLD-MCNC: 7.4 G/DL (ref 12–15.9)
HGB BLD-MCNC: 8 G/DL (ref 12–15.9)
HGB BLD-MCNC: 8.5 G/DL (ref 12–15.9)
HGB BLD-MCNC: 9.5 G/DL (ref 12–15.9)
INR PPP: 1.48 (ref 0.93–1.1)
IRON 24H UR-MRATE: 36 MCG/DL (ref 37–145)
IRON SATN MFR SERPL: 10 % (ref 20–50)
LIPASE SERPL-CCNC: 47 U/L (ref 13–60)
LYMPHOCYTES # BLD AUTO: 1 10*3/MM3 (ref 0.7–3.1)
LYMPHOCYTES NFR BLD AUTO: 22.6 % (ref 19.6–45.3)
MCH RBC QN AUTO: 29.8 PG (ref 26.6–33)
MCHC RBC AUTO-ENTMCNC: 33.1 G/DL (ref 31.5–35.7)
MCV RBC AUTO: 90.2 FL (ref 79–97)
MONOCYTES # BLD AUTO: 0.4 10*3/MM3 (ref 0.1–0.9)
MONOCYTES NFR BLD AUTO: 7.8 % (ref 5–12)
NEUTROPHILS NFR BLD AUTO: 3.1 10*3/MM3 (ref 1.7–7)
NEUTROPHILS NFR BLD AUTO: 66.5 % (ref 42.7–76)
NRBC BLD AUTO-RTO: 0.1 /100 WBC (ref 0–0.2)
PLATELET # BLD AUTO: 105 10*3/MM3 (ref 140–450)
PMV BLD AUTO: 8.2 FL (ref 6–12)
POTASSIUM SERPL-SCNC: 3.2 MMOL/L (ref 3.5–5.2)
PROT SERPL-MCNC: 5.9 G/DL (ref 6–8.5)
PROTHROMBIN TIME: 16 SECONDS (ref 9.6–11.7)
RBC # BLD AUTO: 3.2 10*6/MM3 (ref 3.77–5.28)
RH BLD: POSITIVE
SARS-COV-2 RNA PNL SPEC NAA+PROBE: NOT DETECTED
SODIUM SERPL-SCNC: 136 MMOL/L (ref 136–145)
T&S EXPIRATION DATE: NORMAL
TIBC SERPL-MCNC: 355 MCG/DL (ref 298–536)
TRANSFERRIN SERPL-MCNC: 238 MG/DL (ref 200–360)
WBC # BLD AUTO: 4.6 10*3/MM3 (ref 3.4–10.8)

## 2021-10-03 PROCEDURE — 86901 BLOOD TYPING SEROLOGIC RH(D): CPT | Performed by: EMERGENCY MEDICINE

## 2021-10-03 PROCEDURE — 80053 COMPREHEN METABOLIC PANEL: CPT | Performed by: EMERGENCY MEDICINE

## 2021-10-03 PROCEDURE — 85025 COMPLETE CBC W/AUTO DIFF WBC: CPT | Performed by: EMERGENCY MEDICINE

## 2021-10-03 PROCEDURE — 25010000003 POTASSIUM CHLORIDE 10 MEQ/100ML SOLUTION: Performed by: NURSE PRACTITIONER

## 2021-10-03 PROCEDURE — 99285 EMERGENCY DEPT VISIT HI MDM: CPT

## 2021-10-03 PROCEDURE — 86850 RBC ANTIBODY SCREEN: CPT | Performed by: EMERGENCY MEDICINE

## 2021-10-03 PROCEDURE — U0003 INFECTIOUS AGENT DETECTION BY NUCLEIC ACID (DNA OR RNA); SEVERE ACUTE RESPIRATORY SYNDROME CORONAVIRUS 2 (SARS-COV-2) (CORONAVIRUS DISEASE [COVID-19]), AMPLIFIED PROBE TECHNIQUE, MAKING USE OF HIGH THROUGHPUT TECHNOLOGIES AS DESCRIBED BY CMS-2020-01-R: HCPCS | Performed by: EMERGENCY MEDICINE

## 2021-10-03 PROCEDURE — 86902 BLOOD TYPE ANTIGEN DONOR EA: CPT

## 2021-10-03 PROCEDURE — 36415 COLL VENOUS BLD VENIPUNCTURE: CPT | Performed by: EMERGENCY MEDICINE

## 2021-10-03 PROCEDURE — 99223 1ST HOSP IP/OBS HIGH 75: CPT | Performed by: INTERNAL MEDICINE

## 2021-10-03 PROCEDURE — 85014 HEMATOCRIT: CPT | Performed by: NURSE PRACTITIONER

## 2021-10-03 PROCEDURE — 84466 ASSAY OF TRANSFERRIN: CPT | Performed by: NURSE PRACTITIONER

## 2021-10-03 PROCEDURE — 86905 BLOOD TYPING RBC ANTIGENS: CPT | Performed by: EMERGENCY MEDICINE

## 2021-10-03 PROCEDURE — 43244 EGD VARICES LIGATION: CPT | Performed by: INTERNAL MEDICINE

## 2021-10-03 PROCEDURE — 86922 COMPATIBILITY TEST ANTIGLOB: CPT

## 2021-10-03 PROCEDURE — 85018 HEMOGLOBIN: CPT | Performed by: NURSE PRACTITIONER

## 2021-10-03 PROCEDURE — P9041 ALBUMIN (HUMAN),5%, 50ML: HCPCS | Performed by: NURSE PRACTITIONER

## 2021-10-03 PROCEDURE — 25010000002 OCTREOTIDE PER 25 MCG: Performed by: INTERNAL MEDICINE

## 2021-10-03 PROCEDURE — 25010000002 PROPOFOL 10 MG/ML EMULSION: Performed by: ANESTHESIOLOGY

## 2021-10-03 PROCEDURE — G0378 HOSPITAL OBSERVATION PER HR: HCPCS

## 2021-10-03 PROCEDURE — 25010000002 CEFTRIAXONE PER 250 MG: Performed by: EMERGENCY MEDICINE

## 2021-10-03 PROCEDURE — 82077 ASSAY SPEC XCP UR&BREATH IA: CPT | Performed by: NURSE PRACTITIONER

## 2021-10-03 PROCEDURE — 86900 BLOOD TYPING SEROLOGIC ABO: CPT | Performed by: EMERGENCY MEDICINE

## 2021-10-03 PROCEDURE — 86870 RBC ANTIBODY IDENTIFICATION: CPT | Performed by: EMERGENCY MEDICINE

## 2021-10-03 PROCEDURE — 25010000002 ALBUMIN HUMAN 5% PER 50 ML: Performed by: NURSE PRACTITIONER

## 2021-10-03 PROCEDURE — 85730 THROMBOPLASTIN TIME PARTIAL: CPT | Performed by: EMERGENCY MEDICINE

## 2021-10-03 PROCEDURE — 83540 ASSAY OF IRON: CPT | Performed by: NURSE PRACTITIONER

## 2021-10-03 PROCEDURE — 06L38CZ OCCLUSION OF ESOPHAGEAL VEIN WITH EXTRALUMINAL DEVICE, VIA NATURAL OR ARTIFICIAL OPENING ENDOSCOPIC: ICD-10-PCS | Performed by: INTERNAL MEDICINE

## 2021-10-03 PROCEDURE — 83690 ASSAY OF LIPASE: CPT | Performed by: EMERGENCY MEDICINE

## 2021-10-03 PROCEDURE — 85018 HEMOGLOBIN: CPT | Performed by: INTERNAL MEDICINE

## 2021-10-03 PROCEDURE — 85610 PROTHROMBIN TIME: CPT | Performed by: EMERGENCY MEDICINE

## 2021-10-03 PROCEDURE — 25010000003 POTASSIUM CHLORIDE 10 MEQ/100ML SOLUTION: Performed by: INTERNAL MEDICINE

## 2021-10-03 RX ORDER — ZINC SULFATE 50(220)MG
220 CAPSULE ORAL DAILY
Status: DISCONTINUED | OUTPATIENT
Start: 2021-10-03 | End: 2021-10-06 | Stop reason: HOSPADM

## 2021-10-03 RX ORDER — PANTOPRAZOLE SODIUM 40 MG/10ML
40 INJECTION, POWDER, LYOPHILIZED, FOR SOLUTION INTRAVENOUS
Status: DISCONTINUED | OUTPATIENT
Start: 2021-10-03 | End: 2021-10-03

## 2021-10-03 RX ORDER — PROPOFOL 10 MG/ML
VIAL (ML) INTRAVENOUS AS NEEDED
Status: DISCONTINUED | OUTPATIENT
Start: 2021-10-03 | End: 2021-10-03 | Stop reason: SURG

## 2021-10-03 RX ORDER — MIDODRINE HYDROCHLORIDE 5 MG/1
10 TABLET ORAL EVERY 8 HOURS SCHEDULED
Status: DISCONTINUED | OUTPATIENT
Start: 2021-10-03 | End: 2021-10-03

## 2021-10-03 RX ORDER — FUROSEMIDE 40 MG/1
40 TABLET ORAL DAILY
Status: DISCONTINUED | OUTPATIENT
Start: 2021-10-03 | End: 2021-10-03

## 2021-10-03 RX ORDER — NADOLOL 20 MG/1
20 TABLET ORAL DAILY
Status: DISCONTINUED | OUTPATIENT
Start: 2021-10-03 | End: 2021-10-06 | Stop reason: HOSPADM

## 2021-10-03 RX ORDER — MIDODRINE HYDROCHLORIDE 5 MG/1
10 TABLET ORAL
Status: DISCONTINUED | OUTPATIENT
Start: 2021-10-03 | End: 2021-10-06 | Stop reason: HOSPADM

## 2021-10-03 RX ORDER — PANTOPRAZOLE SODIUM 40 MG/1
40 TABLET, DELAYED RELEASE ORAL DAILY
COMMUNITY

## 2021-10-03 RX ORDER — SODIUM TETRADECYL SULFATE 30 MG/ML
INJECTION, SOLUTION INTRAVENOUS
Status: DISCONTINUED
Start: 2021-10-03 | End: 2021-10-03 | Stop reason: WASHOUT

## 2021-10-03 RX ORDER — MIDODRINE HYDROCHLORIDE 5 MG/1
10 TABLET ORAL EVERY 8 HOURS PRN
COMMUNITY
End: 2021-10-06 | Stop reason: HOSPADM

## 2021-10-03 RX ORDER — NITROGLYCERIN 0.4 MG/1
0.4 TABLET SUBLINGUAL
Status: DISCONTINUED | OUTPATIENT
Start: 2021-10-03 | End: 2021-10-06 | Stop reason: HOSPADM

## 2021-10-03 RX ORDER — SODIUM CHLORIDE 0.9 % (FLUSH) 0.9 %
10 SYRINGE (ML) INJECTION AS NEEDED
Status: DISCONTINUED | OUTPATIENT
Start: 2021-10-03 | End: 2021-10-06 | Stop reason: HOSPADM

## 2021-10-03 RX ORDER — MAGNESIUM SULFATE HEPTAHYDRATE 40 MG/ML
2 INJECTION, SOLUTION INTRAVENOUS AS NEEDED
Status: DISCONTINUED | OUTPATIENT
Start: 2021-10-03 | End: 2021-10-06 | Stop reason: HOSPADM

## 2021-10-03 RX ORDER — ONDANSETRON 4 MG/1
4 TABLET, FILM COATED ORAL EVERY 6 HOURS PRN
Status: DISCONTINUED | OUTPATIENT
Start: 2021-10-03 | End: 2021-10-06 | Stop reason: HOSPADM

## 2021-10-03 RX ORDER — PHENYLEPHRINE HCL IN 0.9% NACL 1 MG/10 ML
SYRINGE (ML) INTRAVENOUS AS NEEDED
Status: DISCONTINUED | OUTPATIENT
Start: 2021-10-03 | End: 2021-10-03 | Stop reason: SURG

## 2021-10-03 RX ORDER — ZINC SULFATE 50(220)MG
220 CAPSULE ORAL DAILY
Status: ON HOLD | COMMUNITY
End: 2021-10-31 | Stop reason: SDUPTHER

## 2021-10-03 RX ORDER — SODIUM CHLORIDE 9 MG/ML
100 INJECTION, SOLUTION INTRAVENOUS CONTINUOUS
Status: DISCONTINUED | OUTPATIENT
Start: 2021-10-03 | End: 2021-10-06

## 2021-10-03 RX ORDER — PANTOPRAZOLE SODIUM 40 MG/10ML
80 INJECTION, POWDER, LYOPHILIZED, FOR SOLUTION INTRAVENOUS ONCE
Status: COMPLETED | OUTPATIENT
Start: 2021-10-03 | End: 2021-10-03

## 2021-10-03 RX ORDER — FOLIC ACID 1 MG/1
1 TABLET ORAL DAILY
Status: DISCONTINUED | OUTPATIENT
Start: 2021-10-03 | End: 2021-10-06 | Stop reason: HOSPADM

## 2021-10-03 RX ORDER — NADOLOL 20 MG/1
20 TABLET ORAL DAILY
Status: DISCONTINUED | OUTPATIENT
Start: 2021-10-03 | End: 2021-10-03

## 2021-10-03 RX ORDER — ALBUMIN, HUMAN INJ 5% 5 %
500 SOLUTION INTRAVENOUS ONCE
Status: COMPLETED | OUTPATIENT
Start: 2021-10-03 | End: 2021-10-03

## 2021-10-03 RX ORDER — ALCOHOL 0.98 ML/ML
INJECTION INTRASPINAL
Status: DISCONTINUED
Start: 2021-10-03 | End: 2021-10-03 | Stop reason: WASHOUT

## 2021-10-03 RX ORDER — FOLIC ACID 1 MG/1
1 TABLET ORAL DAILY
COMMUNITY

## 2021-10-03 RX ORDER — ONDANSETRON 2 MG/ML
4 INJECTION INTRAMUSCULAR; INTRAVENOUS EVERY 6 HOURS PRN
Status: DISCONTINUED | OUTPATIENT
Start: 2021-10-03 | End: 2021-10-06 | Stop reason: HOSPADM

## 2021-10-03 RX ORDER — PANTOPRAZOLE SODIUM 40 MG/10ML
40 INJECTION, POWDER, LYOPHILIZED, FOR SOLUTION INTRAVENOUS EVERY 12 HOURS SCHEDULED
Status: DISCONTINUED | OUTPATIENT
Start: 2021-10-03 | End: 2021-10-03

## 2021-10-03 RX ORDER — ZINC SULFATE 50(220)MG
220 CAPSULE ORAL DAILY
Status: DISCONTINUED | OUTPATIENT
Start: 2021-10-03 | End: 2021-10-03

## 2021-10-03 RX ORDER — FOLIC ACID 1 MG/1
1 TABLET ORAL DAILY
Status: DISCONTINUED | OUTPATIENT
Start: 2021-10-03 | End: 2021-10-03

## 2021-10-03 RX ORDER — MAGNESIUM SULFATE HEPTAHYDRATE 40 MG/ML
4 INJECTION, SOLUTION INTRAVENOUS AS NEEDED
Status: DISCONTINUED | OUTPATIENT
Start: 2021-10-03 | End: 2021-10-06 | Stop reason: HOSPADM

## 2021-10-03 RX ORDER — ONDANSETRON 2 MG/ML
4 INJECTION INTRAMUSCULAR; INTRAVENOUS EVERY 6 HOURS PRN
Status: DISCONTINUED | OUTPATIENT
Start: 2021-10-03 | End: 2021-10-03 | Stop reason: SDUPTHER

## 2021-10-03 RX ORDER — NADOLOL 20 MG/1
20 TABLET ORAL DAILY
Status: ON HOLD | COMMUNITY
End: 2021-10-31 | Stop reason: SDUPTHER

## 2021-10-03 RX ORDER — POTASSIUM CHLORIDE 7.45 MG/ML
10 INJECTION INTRAVENOUS
Status: DISCONTINUED | OUTPATIENT
Start: 2021-10-03 | End: 2021-10-06

## 2021-10-03 RX ADMIN — SODIUM CHLORIDE 100 ML/HR: 9 INJECTION, SOLUTION INTRAVENOUS at 17:15

## 2021-10-03 RX ADMIN — Medication 100 MG: at 10:57

## 2021-10-03 RX ADMIN — PANTOPRAZOLE SODIUM 8 MG/HR: 40 INJECTION, POWDER, FOR SOLUTION INTRAVENOUS at 16:35

## 2021-10-03 RX ADMIN — Medication 100 MCG: at 12:25

## 2021-10-03 RX ADMIN — PANTOPRAZOLE SODIUM 8 MG/HR: 40 INJECTION, POWDER, FOR SOLUTION INTRAVENOUS at 21:39

## 2021-10-03 RX ADMIN — MIDODRINE HYDROCHLORIDE 10 MG: 5 TABLET ORAL at 16:34

## 2021-10-03 RX ADMIN — PROPOFOL 100 MG: 10 INJECTION, EMULSION INTRAVENOUS at 12:27

## 2021-10-03 RX ADMIN — PROPOFOL 100 MG: 10 INJECTION, EMULSION INTRAVENOUS at 12:19

## 2021-10-03 RX ADMIN — SODIUM CHLORIDE 100 ML/HR: 9 INJECTION, SOLUTION INTRAVENOUS at 06:09

## 2021-10-03 RX ADMIN — OCTREOTIDE ACETATE 25 MCG/HR: 500 INJECTION, SOLUTION INTRAVENOUS; SUBCUTANEOUS at 20:06

## 2021-10-03 RX ADMIN — CEFTRIAXONE 1 G: 10 INJECTION, POWDER, FOR SOLUTION INTRAVENOUS at 04:41

## 2021-10-03 RX ADMIN — ALBUMIN HUMAN 500 ML: 0.05 INJECTION, SOLUTION INTRAVENOUS at 23:43

## 2021-10-03 RX ADMIN — PANTOPRAZOLE SODIUM 80 MG: 40 INJECTION, POWDER, FOR SOLUTION INTRAVENOUS at 04:26

## 2021-10-03 RX ADMIN — FOLIC ACID 1 MG: 1 TABLET ORAL at 15:45

## 2021-10-03 RX ADMIN — PANTOPRAZOLE SODIUM 8 MG/HR: 40 INJECTION, POWDER, FOR SOLUTION INTRAVENOUS at 10:57

## 2021-10-03 RX ADMIN — PROPOFOL 100 MG: 10 INJECTION, EMULSION INTRAVENOUS at 12:16

## 2021-10-03 RX ADMIN — PROPOFOL 100 MG: 10 INJECTION, EMULSION INTRAVENOUS at 12:23

## 2021-10-03 RX ADMIN — POTASSIUM CHLORIDE 10 MEQ: 7.46 INJECTION, SOLUTION INTRAVENOUS at 06:10

## 2021-10-03 RX ADMIN — NADOLOL 20 MG: 20 TABLET ORAL at 15:44

## 2021-10-03 RX ADMIN — PROPOFOL 100 MG: 10 INJECTION, EMULSION INTRAVENOUS at 12:25

## 2021-10-03 NOTE — NURSING NOTE
Placed call to [facility] per patient information this is the pharmacy she uses. Spoke with  Pharmacist, Alisa, who clarifies patient takes Midodrine 10mg every 8 hours, Protonix 40mg twice daily and Nadolol 20mg QD. Noted information updated in patient's home medication list and Dr. Beasley notified.

## 2021-10-03 NOTE — OP NOTE
ESOPHAGOGASTRODUODENOSCOPY Procedure Report    Patient Name:  Alondra Eduardo  YOB: 1985    Date of Surgery:  10/3/2021     Pre-Op Diagnosis:  Acute upper GI bleed [K92.2]       Postop diagnosis:  Esophageal variceal bleed    Procedure/CPT® Codes:      Procedure(s):  ESOPHAGOGASTRODUODENOSCOPY with esophageal variceal banding ligation    Staff:  Surgeon(s):  Tha Saleh MD      Anesthesia: Monitored Anesthesia Care    Description of Procedure:  A description of the procedure as well as risks, benefits and alternative methods were explained to the patient who voiced understanding and signed the corresponding consent form. A physical exam was performed and vital signs were monitored throughout the procedure.    An upper GI endoscope was placed into the mouth and proceeded through the esophagus, stomach and second portion of the duodenum without difficulty. The scope was then retroflexed and the fundus was visualized. The procedure was not difficult and there were no immediate complications.  There was no blood loss.    Impression:  1.  3 cords of F3 varices in the distal third of the esophagus with one cord having a nipple sign on it with a blood clot over it. As soon as suction was placed on this the nipple ruptured and caused a red out on the screen from rapid bleeding.  A band was placed over this successfully with complete hemostasis achieved.  In fear that this band would pop off, no other bands were placed near it.  One other band was placed 2 cm proximal and on the opposite wall.  2.  Blood filling much of the stomach limiting the view.  3.  Normal duodenal mucosa with blood filling the duodenal bulb      Recommendations:  Octreotide drip x72 hours  IV every 12 hours PPI x72 hours  Ceftriaxone x5 days  Repeat EGD with banding in 4 weeks      Tha Saleh MD     Date: 10/3/2021    Time: 12:31 EDT

## 2021-10-03 NOTE — NURSING NOTE
Patient arrived on unit awake, alert, clearly able to communicate with staff. RN to complete admission.

## 2021-10-03 NOTE — H&P
[facility] Medicine Services      Patient Name: Alondra Eduardo  : 1985  MRN: [id number]  Primary Care Physician:  Irene Henriquez MD  Date of admission: 10/3/2021      Subjective      Chief Complaint: Vomiting blood    History of Present Illness: Alondra Eduardo is a very pleasant 36 y.o. female who presented to [facility] on 10/3/2021 complaining of vomiting bright red blood this evening with clots.  She has a past medical history of her cirrhosis with previous episodes of esophageal variceal bleeding.  Last episode was in 2021.  She also has a past medical history of hepatitis C untreated.  Denies alcohol use.  Reports she only had one episode of vomiting yesterday .  She reports her stool was darker than usual but may have swallowed blood.  Globin today is 9.5 which is stable from previous.  Her enzymes are improved from last admission in August with ALT 25 AST 52 and bilirubin 2.1.  Reports some mild abdominal pain rated 1.  Potassium today is 3.2.  She was given one-time dose Protonix IV in ED and prophylactic 1 g ceftriaxone IV and gastroenterology has been consulted for EGD.  Has medical history also includes diastolic CHF and untreated hepatitis C.  She reports being partially COVID-19 vaccinated.  COVID-19 ordered and pending.    Review of Systems   Constitutional: Negative.   HENT: Negative.    Eyes: Negative.    Cardiovascular: Negative.    Respiratory: Negative.    Endocrine: Negative.    Hematologic/Lymphatic: Negative.    Skin: Negative.    Musculoskeletal: Negative.    Gastrointestinal: Positive for hematochezia, melena and vomiting.   Genitourinary: Negative.    Neurological: Negative.    Psychiatric/Behavioral: Negative.    Allergic/Immunologic: Negative.         Personal History     Past Medical History:   Diagnosis Date   • Esophageal varices (HCC)    • Hepatitis C    • Hypotension    • Renal insufficiency        Past Surgical History:    Procedure Laterality Date   • ENDOSCOPY N/A 8/10/2021    Procedure: ESOPHAGOGASTRODUODENOSCOPY AT BEDSIDE with banding x4;  Surgeon: Clark Ochoa MD;  Location: [facility] ENDOSCOPY;  Service: Gastroenterology;  Laterality: N/A;  post: esophageal varices with banding x4   • ESOPHAGEAL VARICES LIGATION     • HERNIA REPAIR         Family History: family history includes Cancer in her mother; Diabetes in her father; Heart disease in her paternal grandfather. Otherwise pertinent FHx was reviewed and not pertinent to current issue.    Social History:  reports that she has quit smoking. She smoked 0.00 packs per day. She has never used smokeless tobacco. She reports previous alcohol use. She reports previous drug use.    Home Medications:  Prior to Admission Medications     Prescriptions Last Dose Informant Patient Reported? Taking?    DPH-Lido-AlHydr-MgHydr-Simeth (FIRST-MOUTHWASH BLM MT)   Yes No    Apply 5 mL to the mouth or throat 4 (Four) Times a Day Before Meals & at Bedtime.    folic acid (FOLVITE) 1 MG tablet   No No    Take 1 tablet by mouth Daily.    furosemide (Lasix) 40 MG tablet   No No    Take 1 tablet by mouth Daily.    midodrine (PROAMATINE) 10 MG tablet   No No    Take 1 tablet by mouth Every 8 (Eight) Hours.    nadolol (CORGARD) 20 MG tablet   No No    Take 1 tablet by mouth Daily.    pantoprazole (PROTONIX) 40 MG EC tablet   No No    Take 1 tablet by mouth 2 (Two) Times a Day Before Meals.    spironolactone (Aldactone) 25 MG tablet   No No    Take 1 tablet by mouth Daily for 5 days.    thiamine (thiamine) 100 MG tablet tablet   No No    Take 1 tablet by mouth Daily.    zinc sulfate (ZINCATE) 220 (50 Zn) MG capsule   No No    Take 1 capsule by mouth Daily.            Allergies:  No Known Allergies    Objective      Vitals:   Temp:  [98.3 °F (36.8 °C)] 98.3 °F (36.8 °C)  Heart Rate:  [88] 88  Resp:  [18] 18  BP: (120)/(53) 120/53    Physical Exam  Vitals reviewed.   Constitutional:        Appearance: Normal appearance. She is normal weight.   HENT:      Head: Normocephalic and atraumatic.      Right Ear: External ear normal.      Left Ear: External ear normal.      Nose: Nose normal.      Mouth/Throat:      Mouth: Mucous membranes are moist.   Eyes:      Extraocular Movements: Extraocular movements intact.      Pupils: Pupils are equal, round, and reactive to light.   Cardiovascular:      Rate and Rhythm: Normal rate and regular rhythm.      Heart sounds: Normal heart sounds.   Pulmonary:      Effort: Pulmonary effort is normal.      Breath sounds: Normal breath sounds.   Abdominal:      Palpations: Abdomen is soft.   Genitourinary:     Comments: Deferred  Musculoskeletal:         General: Normal range of motion.      Cervical back: Normal range of motion and neck supple.   Skin:     General: Skin is warm and dry.   Neurological:      General: No focal deficit present.      Mental Status: She is alert and oriented to person, place, and time.   Psychiatric:         Mood and Affect: Mood normal.         Behavior: Behavior normal.         Thought Content: Thought content normal.         Judgment: Judgment normal.          Result Review    Result Review:  I have personally reviewed the results from the time of this admission to 10/3/2021 05:37 EDT and agree with these findings:  [x]  Laboratory  []  Microbiology  []  Radiology  []  EKG/Telemetry   []  Cardiology/Vascular   []  Pathology  [x]  Old records  []  Other:  Most notable findings include: Clinical presentation hemoglobin stable at 9.5 past medical records indicating previous esophageal varices.    Assessment/Plan        Active Hospital Problems:  Active Hospital Problems    Diagnosis    • Acute upper GI bleed    • Esophageal varices (HCC)    • Hypokalemia      Plan:     Acute upper GI bleed secondary to esophageal varices with past medical history of liver cirrhosis, hemoglobin and LFTs stable from previous, 40 mg IV Protonix twice daily, normal  saline IV fluid hydration, n.p.o. status except ice chips, monitor H&H every 8 hours, type and cross, gastroenterology consulted    Hypokalemia potassium 3.2, IV potassium replacement started magnesium pending    Liver cirrhosis, untreated hepatitis C, home meds unverified patient n.p.o. not reordered at this time    Diastolic CHF stable Home meds unverified at this time n.p.o. status not reordered meds at this time continuous cardiac monitoring      DVT prophylaxis:  Mechanical DVT prophylaxis orders are present.    CODE STATUS:    Code Status: CPR  Medical Interventions (Level of Support Prior to Arrest): Full    Admission Status:  I believe this patient meets observation status.      I discussed the patient's findings and my recommendations with patient and family member at bedside.    This patient has been examined wearing appropriate Personal Protective Equipment     Signature: Electronically signed by SEGUN Rubalcava, 10/03/21, 5:57 AM EDT.      Attending attestation:    I have reviewed the LISBETH's note and agree with the documented findings and plan of care unless stated otherwise by my note.    36 y.o. female who presented to [facility] on 10/3/2021 complaining of vomiting bright red blood this evening with clots.  She has a past medical history of her cirrhosis with previous episodes of esophageal variceal bleeding.  Last episode was in August 2021.  She also has a past medical history of hepatitis C untreated.  Denies alcohol use.  Reports she only had one episode of vomiting yesterday .  She reports her stool was darker than usual but may have swallowed blood. Hemoglobin stable on admission compared to previous. Had banding done during last time but does not know how many. Discussed with GI, plan for EGD today. Keep n.p.o.    General: Awake, alert, NAD  Eyes: PERRL, EOMI, conjunctive are clear  Cardiovascular: Regular rate and rhythm, no murmurs  Respiratory: Clear to auscultation  bilaterally, no wheezing or rales, unlabored breathing  Abdomen: Soft, nontender, positive bowel sounds, no guarding  Neurologic: A&O, CN grossly intact, moves all extremities spontaneously  Musculoskeletal: Normal range of motion, no deformities  Skin: Warm, dry, intact    Assessment:  Upper GI bleed likely secondary to esophageal varices  Hep C liver cirrhosis  Hypokalemia  Chronic anemia    Plan:  -Hemoglobin stable, discussed with GI, plan for EGD today  -Continue PPI and ceftriaxone for SBP prophylaxis  -Consider octreotide if EGD being delayed  -Monitor hemoglobin  -Replace electrolytes  -GI consulted      Electronically signed by Kanu Beasley DO, 10/03/21, 9:34 AM EDT.

## 2021-10-03 NOTE — CONSULTS
"Patient Care Team:  Irene Henriquez MD as PCP - General (Internal Medicine)  Jean Hernandez MD as Consulting Physician (Nephrology)    Chief complaint:   GI bleed     Subjective    \" I vomited blood\"    History of present illness:    MARGAUX BURNETT is a  36 year old female with a history of alcohol abuse, alcohol and hepatitis C cirrhosis (treatment naïve) with bleeding esophageal varices and ascites, CHF who was just seen by me in the office on 9/30/21 for hospital follow-up. And to start Hep C treatment.     Patient presented to the ER on 10/3/2021 vomiting blood.  She had some abdominal pain with the vomiting.  Patient states she ate about 3:00 this morning 10/3/2021 and shortly after that she vomited food and blood.  States she had been feeling fine the previous day.  Denies any fever or chills.  States she has not been taking her PPI as she has had difficulty getting it refilled.  During her office visit she never mentioned having difficulty filling it to me.  Patient's labs in the ER on 10/3/2021 were WBCs 4.6, hemoglobin 9.5, MCV 90.2, platelets 105.  INR 1.48.  BUN 7 creatinine 0.54.  TB 2.1, AST 52, ALT and alk phos normal.  She had a 1 episode of vomiting and has not had any since 3 AM.  Patient states her stools may have been dark the last couple of days.    8/25/2021 WBC 6.3, Hgb 8.8, MCV 96.2, PLT 86. Creatinine 0.55, sodium 135, potassium 3.1  8/15/2021 WBCs 5.4, Hgb 8.5, MCV 96, PLT 54. ALT 32, AST 37H, alk phos 69, TB 2.9. INR 1.97.  8/10/2021 WBCs 13.3, Hgb 8.7, MCV 96.9, PLT 83. Ceruloplasmin 12, ferritin 59.3. Alpha-1 antitrypsin 133, acetaminophen less than 5. GGT 25, iron 126. Hepatitis C quant 337,000, genotype 1A. Smooth muscle antibody 6, mitochondrial antibody less than 20.  8/9/21 WBCs 15, Hgb 5.6, .8, . BUN 32, Cr 2.48. Sodium 130, potassium 4.8. TB 4.3, alk phos 80, AST 42, ALT 32. Hepatitis panel negative except for hepatitis C antibody positive. INR " 1.88. KASIA negative. AFP 4    Endo History:  8/10/2021 EGD (Dr. Ochoa) at least 3 columns of grade 3 varices extending to the midesophagus status post banding ×4. Erosive esophagitis. The GE junction Chronic appearing 9 mm prepyloric antral ulcer without stigmata of bleeding.        Past Medical History:  Past Medical History:   Diagnosis Date   • Esophageal varices (HCC)    • Hepatitis C    • Hypotension    • Renal insufficiency        Past Surgical History:  Past Surgical History:   Procedure Laterality Date   • ENDOSCOPY N/A 8/10/2021    Procedure: ESOPHAGOGASTRODUODENOSCOPY AT BEDSIDE with banding x4;  Surgeon: Clark Ochoa MD;  Location: [facility] ENDOSCOPY;  Service: Gastroenterology;  Laterality: N/A;  post: esophageal varices with banding x4   • ESOPHAGEAL VARICES LIGATION     • HERNIA REPAIR         Social History:  Social History     Tobacco Use   • Smoking status: Former Smoker     Packs/day: 0.00   • Smokeless tobacco: Never Used   Vaping Use   • Vaping Use: Never used   Substance Use Topics   • Alcohol use: Not Currently   • Drug use: Not Currently       Family History:  Family History   Problem Relation Age of Onset   • Cancer Mother    • Diabetes Father    • Heart disease Paternal Grandfather        Medications:  (Not in a hospital admission)      Scheduled Meds:[START ON 10/4/2021] cefTRIAXone, 1 g, Intravenous, Q24H  pantoprazole, 40 mg, Intravenous, Q12H      Continuous Infusions:sodium chloride, 100 mL/hr, Last Rate: 100 mL/hr (10/03/21 0609)      PRN Meds:.magnesium sulfate **OR** magnesium sulfate **OR** magnesium sulfate  •  nitroglycerin  •  ondansetron  •  potassium chloride  •  potassium chloride  •  [COMPLETED] Insert peripheral IV **AND** sodium chloride    ALLERGIES:  Patient has no known allergies.    ROS:  Positive nausea, vomiting, abdominal pain, hematemesis questionable melena  The following systems were reviewed and negative;   Constitution:  No fevers, chills, no  unintentional weight loss  Skin: no rash, no jaundice  Eyes:  No blurry vision, no eye pain  HENT:  No change in hearing or smell  Resp:  No dyspnea or cough  CV:  No chest pain or palpitations  :  No dysuria, hematuria  Musculoskeletal:  No leg cramps or arthralgias  Neuro:  No tremor, no numbness  Psych:  No depression or confsuion    Objective Resting in hospital bed.  ER 16.    Vital Signs:   Vitals:    10/03/21 0701 10/03/21 0716 10/03/21 0731 10/03/21 0746   BP: 103/65 98/66 114/72 101/62   BP Location:       Patient Position:       Pulse: 84 84 89 89   Resp:    18   Temp:    98 °F (36.7 °C)   TempSrc:       SpO2: 98% 97% 99% 96%   Weight:       Height:           Physical Exam:   General Appearance:    Awake and alert, in no acute distress   Head:    Normocephalic, without obvious abnormality, atraumatic   Eyes:            Conjunctivae normal, anicteric sclera, pupils equal   Ears:    Ears appear intact with no abnormalities noted   Throat:   No oral lesions, no thrush, oral mucosa moist   Neck:   Supple, no JVD   Lungs:     Clear to auscultation bilaterally, respirations regular, even and unlabored    Heart:    Regular rhythm and normal rate, normal S1 and S2, no            Murmur appreciated   Chest Wall:    No abnormalities observed   Abdomen:     Normal bowel sounds, soft, non-tender, no rebound or guarding, non-distended, no hepatosplenomegaly   Rectal:     Deferred   Extremities:   Moves all extremities, no edema, no cyanosis   Pulses:   Pulses palpable and equal bilaterally   Skin:   No rash, no jaundice, normal palpaion   Lymph nodes:   No cervical, supraclavicular or submandibular palpable adenopathy   Neurologic:   Cranial nerves 2 - 12 grossly intact, no asterixis     Results Review:   I reviewed the patient's labs and imaging.  Lab Results (last 24 hours)     Procedure Component Value Units Date/Time    COVID PRE-OP / PRE-PROCEDURE SCREENING ORDER (NO ISOLATION) - Swab, Nasopharynx [[id number]]   (Normal) Collected: 10/03/21 0437    Specimen: Swab from Nasopharynx Updated: 10/03/21 0532    Narrative:      The following orders were created for panel order COVID PRE-OP / PRE-PROCEDURE SCREENING ORDER (NO ISOLATION) - Swab, Nasopharynx.  Procedure                               Abnormality         Status                     ---------                               -----------         ------                     COVID-19,CEPHEID/JOSÉ MIGUEL/BD...[[id number]]  Normal              Final result                 Please view results for these tests on the individual orders.    COVID-19,CEPHEID/JOSÉ MIGUEL/BDMAX,COR/PATIENCE/PAD/MARSHALL IN-HOUSE(OR EMERGENT/ADD-ON),NP SWAB IN TRANSPORT MEDIA 3-4 HR TAT, RT-PCR - Swab, Nasopharynx [[id number]]  (Normal) Collected: 10/03/21 0437    Specimen: Swab from Nasopharynx Updated: 10/03/21 0532     COVID19 Not Detected    Narrative:      Fact sheet for providers: https://www.fda.gov/media/094484/download     Fact sheet for patients: https://www.fda.gov/media/079810/download  Fact sheet for providers: https://www.fda.gov/media/773530/download     Fact sheet for patients: https://www.fda.gov/media/525227/download    Comprehensive Metabolic Panel [[id number]]  (Abnormal) Collected: 10/03/21 0426    Specimen: Blood Updated: 10/03/21 0458     Glucose 116 mg/dL      BUN 7 mg/dL      Creatinine 0.54 mg/dL      Sodium 136 mmol/L      Potassium 3.2 mmol/L      Chloride 103 mmol/L      CO2 25.0 mmol/L      Calcium 8.0 mg/dL      Total Protein 5.9 g/dL      Albumin 2.70 g/dL      ALT (SGPT) 25 U/L      AST (SGOT) 52 U/L      Alkaline Phosphatase 105 U/L      Total Bilirubin 2.1 mg/dL      eGFR Non African Amer 129 mL/min/1.73      Globulin 3.2 gm/dL      A/G Ratio 0.8 g/dL      BUN/Creatinine Ratio 13.0     Anion Gap 8.0 mmol/L     Narrative:      GFR Normal >60  Chronic Kidney Disease <60  Kidney Failure <15      Lipase [[id number]]  (Normal) Collected: 10/03/21 0426    Specimen: Blood Updated: 10/03/21 0458     Lipase  47 U/L     Protime-INR [[id number]]  (Abnormal) Collected: 10/03/21 0426    Specimen: Blood Updated: 10/03/21 0453     Protime 16.0 Seconds      INR 1.48    aPTT [[id number]]  (Abnormal) Collected: 10/03/21 0426    Specimen: Blood Updated: 10/03/21 0453     PTT 34.6 seconds     CBC & Differential [[id number]]  (Abnormal) Collected: 10/03/21 0414    Specimen: Blood Updated: 10/03/21 0420    Narrative:      The following orders were created for panel order CBC & Differential.  Procedure                               Abnormality         Status                     ---------                               -----------         ------                     CBC Auto Differential[[id number]]        Abnormal            Final result                 Please view results for these tests on the individual orders.    CBC Auto Differential [[id number]]  (Abnormal) Collected: 10/03/21 0414    Specimen: Blood Updated: 10/03/21 0420     WBC 4.60 10*3/mm3      RBC 3.20 10*6/mm3      Hemoglobin 9.5 g/dL      Hematocrit 28.9 %      MCV 90.2 fL      MCH 29.8 pg      MCHC 33.1 g/dL      RDW 18.4 %      RDW-SD 58.6 fl      MPV 8.2 fL      Platelets 105 10*3/mm3      Neutrophil % 66.5 %      Lymphocyte % 22.6 %      Monocyte % 7.8 %      Eosinophil % 1.7 %      Basophil % 1.4 %      Neutrophils, Absolute 3.10 10*3/mm3      Lymphocytes, Absolute 1.00 10*3/mm3      Monocytes, Absolute 0.40 10*3/mm3      Eosinophils, Absolute 0.10 10*3/mm3      Basophils, Absolute 0.10 10*3/mm3      nRBC 0.1 /100 WBC           Imaging Results (Last 24 Hours)     ** No results found for the last 24 hours. **             ASSESSMENT AND PLAN:  Hematemesis consider esophageal bleed versus esophagitis versus gastritis versus known ulcer  Alcohol and hepatitis C cirrhosis (treatment naïve) with bleeding esophageal varices and ascites  Elevated LFTs and INR due to above  Normocytic anemia-chronic and related to hematemesis  Thrombocytopenia rated to cirrhosis  History of  alcohol abuse, sober  CHF  Hypokalemia    8/10/2021 EGD (Dr. Ochoa) at least 3 columns of grade 3 varices extending to the midesophagus status post banding ×4. Erosive esophagitis. The GE junction Chronic appearing 9 mm prepyloric antral ulcer without stigmata of bleeding.    PLAN:  EGD today for evaluation of hematemesis  Monitor H&H and transfuse for hemoglobin less than 7  Will start PPI drip & Octreoride drip, zinc, thiamine  Rocephin for SBP prevention  Electrolyte replacement    I discussed the patients findings and my recommendations with the patient.  Annamaria Masters, APRN  10/03/21  09:25 EDT    Time:

## 2021-10-03 NOTE — ED PROVIDER NOTES
Subjective   Chief complaint: Vomiting blood    36-year-old female with a history of esophageal varices presents after vomiting blood.  Patient states she vomited once tonight and there was dark red blood and clots.  She had some abdominal pain right after vomiting but states that has improved now.  She denies fever.  She denies any alleviating or exacerbating factors.  She states she has had GI bleeding in the past.  She states she has not been taking her PPI because she has had difficulty getting it refilled.      History provided by:  Patient      Review of Systems   Constitutional: Negative for fever.   HENT: Negative for congestion and sore throat.    Eyes: Negative for redness.   Respiratory: Negative for cough and shortness of breath.    Cardiovascular: Negative for chest pain.   Gastrointestinal: Positive for abdominal pain, nausea and vomiting. Negative for diarrhea.   Genitourinary: Negative for dysuria.   Musculoskeletal: Negative for back pain.   Skin: Negative for rash.   Neurological: Negative for dizziness and headaches.   Psychiatric/Behavioral: Negative for confusion.       Past Medical History:   Diagnosis Date   • Esophageal varices (CMS/HCC)    • Hepatitis C    • Hypotension    • Renal insufficiency        No Known Allergies    Past Surgical History:   Procedure Laterality Date   • ENDOSCOPY N/A 8/10/2021    Procedure: ESOPHAGOGASTRODUODENOSCOPY AT BEDSIDE with banding x4;  Surgeon: Clark Ochoa MD;  Location: [facility] ENDOSCOPY;  Service: Gastroenterology;  Laterality: N/A;  post: esophageal varices with banding x4   • ESOPHAGEAL VARICES LIGATION     • HERNIA REPAIR         Family History   Problem Relation Age of Onset   • Cancer Mother    • Diabetes Father    • Heart disease Paternal Grandfather        Social History     Socioeconomic History   • Marital status: Single     Spouse name: Not on file   • Number of children: Not on file   • Years of education: Not on file   • Highest  education level: Not on file   Tobacco Use   • Smoking status: Former Smoker     Packs/day: 0.00   • Smokeless tobacco: Never Used   Vaping Use   • Vaping Use: Never used   Substance and Sexual Activity   • Alcohol use: Not Currently   • Drug use: Not Currently   • Sexual activity: Defer       /53 (BP Location: Left arm, Patient Position: Sitting)   Pulse 88   Temp 98.3 °F (36.8 °C) (Oral)   Resp 18   SpO2 99%       Objective   Physical Exam  Vitals and nursing note reviewed.   Constitutional:       Appearance: Normal appearance. She is well-developed.   HENT:      Head: Normocephalic and atraumatic.   Eyes:      Extraocular Movements: Extraocular movements intact.      Pupils: Pupils are equal, round, and reactive to light.   Cardiovascular:      Rate and Rhythm: Normal rate and regular rhythm.      Heart sounds: Normal heart sounds.   Pulmonary:      Effort: Pulmonary effort is normal. No respiratory distress.      Breath sounds: Normal breath sounds.   Abdominal:      General: Bowel sounds are normal.      Palpations: Abdomen is soft.      Tenderness: There is no abdominal tenderness.   Musculoskeletal:         General: Normal range of motion.      Cervical back: Normal range of motion and neck supple.   Skin:     General: Skin is warm and dry.   Neurological:      General: No focal deficit present.      Mental Status: She is alert and oriented to person, place, and time.         Procedures           ED Course      Results for orders placed or performed during the hospital encounter of 10/03/21   Comprehensive Metabolic Panel    Specimen: Blood   Result Value Ref Range    Glucose 116 (H) 65 - 99 mg/dL    BUN 7 6 - 20 mg/dL    Creatinine 0.54 (L) 0.57 - 1.00 mg/dL    Sodium 136 136 - 145 mmol/L    Potassium 3.2 (L) 3.5 - 5.2 mmol/L    Chloride 103 98 - 107 mmol/L    CO2 25.0 22.0 - 29.0 mmol/L    Calcium 8.0 (L) 8.6 - 10.5 mg/dL    Total Protein 5.9 (L) 6.0 - 8.5 g/dL    Albumin 2.70 (L) 3.50 - 5.20 g/dL     ALT (SGPT) 25 1 - 33 U/L    AST (SGOT) 52 (H) 1 - 32 U/L    Alkaline Phosphatase 105 39 - 117 U/L    Total Bilirubin 2.1 (H) 0.0 - 1.2 mg/dL    eGFR Non African Amer 129 >60 mL/min/1.73    Globulin 3.2 gm/dL    A/G Ratio 0.8 g/dL    BUN/Creatinine Ratio 13.0 7.0 - 25.0    Anion Gap 8.0 5.0 - 15.0 mmol/L   Protime-INR    Specimen: Blood   Result Value Ref Range    Protime 16.0 (H) 9.6 - 11.7 Seconds    INR 1.48 (H) 0.93 - 1.10   aPTT    Specimen: Blood   Result Value Ref Range    PTT 34.6 (H) 24.0 - 31.0 seconds   Lipase    Specimen: Blood   Result Value Ref Range    Lipase 47 13 - 60 U/L   CBC Auto Differential    Specimen: Blood   Result Value Ref Range    WBC 4.60 3.40 - 10.80 10*3/mm3    RBC 3.20 (L) 3.77 - 5.28 10*6/mm3    Hemoglobin 9.5 (L) 12.0 - 15.9 g/dL    Hematocrit 28.9 (L) 34.0 - 46.6 %    MCV 90.2 79.0 - 97.0 fL    MCH 29.8 26.6 - 33.0 pg    MCHC 33.1 31.5 - 35.7 g/dL    RDW 18.4 (H) 12.3 - 15.4 %    RDW-SD 58.6 (H) 37.0 - 54.0 fl    MPV 8.2 6.0 - 12.0 fL    Platelets 105 (L) 140 - 450 10*3/mm3    Neutrophil % 66.5 42.7 - 76.0 %    Lymphocyte % 22.6 19.6 - 45.3 %    Monocyte % 7.8 5.0 - 12.0 %    Eosinophil % 1.7 0.3 - 6.2 %    Basophil % 1.4 0.0 - 1.5 %    Neutrophils, Absolute 3.10 1.70 - 7.00 10*3/mm3    Lymphocytes, Absolute 1.00 0.70 - 3.10 10*3/mm3    Monocytes, Absolute 0.40 0.10 - 0.90 10*3/mm3    Eosinophils, Absolute 0.10 0.00 - 0.40 10*3/mm3    Basophils, Absolute 0.10 0.00 - 0.20 10*3/mm3    nRBC 0.1 0.0 - 0.2 /100 WBC                                          MDM   Patient had the above evaluation.  Results were discussed with the patient.  IV access was established and the patient was given a dose of Protonix.  She was also given a dose of Rocephin for SBP prophylaxis.  She has had no vomiting in the emergency room.  White blood cell count is normal.  Hemoglobin is 9.5.  I discussed with the nurse practitioner on-call for the hospitalist and the patient will be admitted for further  evaluation and management.      Final diagnoses:   Gastrointestinal hemorrhage, unspecified gastrointestinal hemorrhage type       ED Disposition  ED Disposition     ED Disposition Condition Comment    Decision to Admit  Level of Care: Med/Surg [1]   Diagnosis: Acute upper GI bleed [[id number]]   Admitting Physician: HEIDI KAPOOR [[id number]]   Bed Request Comments: cardiac monitor            No follow-up provider specified.       Medication List      No changes were made to your prescriptions during this visit.          Ritchie Stone MD  10/03/21 0584

## 2021-10-03 NOTE — ANESTHESIA PREPROCEDURE EVALUATION
Anesthesia Evaluation     NPO Solid Status: > 8 hours  NPO Liquid Status: > 8 hours           Airway   Mallampati: II  TM distance: >3 FB  No difficulty expected  Dental      Pulmonary    Cardiovascular         Neuro/Psych  GI/Hepatic/Renal/Endo    (+)  GI bleeding active bleeding, hepatitis C, liver disease, renal disease, diabetes mellitus,     Musculoskeletal     Abdominal    Substance History      OB/GYN          Other        ROS/Med Hx Other: From medicine note    Alondra Eduardo is a very pleasant 36 y.o. female who presented to [facility] on 10/3/2021 complaining of vomiting bright red blood this evening with clots.  She has a past medical history of her cirrhosis with previous episodes of esophageal variceal bleeding.  Last episode was in August 2021.  She also has a past medical history of hepatitis C untreated.  Denies alcohol use.  Reports she only had one episode of vomiting yesterday .  She reports her stool was darker than usual but may have swallowed blood.  Globin today is 9.5 which is stable from previous.  Her enzymes are improved from last admission in August with ALT 25 AST 52 and bilirubin 2.1.  Reports some mild abdominal pain rated 1.  Potassium today is 3.2.  She was given one-time dose Protonix IV in ED and prophylactic 1 g ceftriaxone IV and gastroenterology has been consulted for EGD.  Has medical history also includes diastolic CHF and untreated hepatitis C.  She reports being partially COVID-19 vaccinated.  COVID-19 ordered and pending                  Anesthesia Plan    ASA 3 - emergent     MAC     intravenous induction     Anesthetic plan, all risks, benefits, and alternatives have been provided, discussed and informed consent has been obtained with: patient.

## 2021-10-03 NOTE — NURSING NOTE
Clarified with Annamaria Masters NP GI per Dr. Beasley r/t patient needing blood transfusion. Annamaria Masters NP, no blood at this time. Notified Blood Bank of no blood for patient at this time.

## 2021-10-03 NOTE — ANESTHESIA POSTPROCEDURE EVALUATION
Patient: Alondra WEBER Tevis    Procedure Summary     Date: 10/03/21 Room / Location: [facility] ENDOSCOPY 1 / [facility] ENDOSCOPY    Anesthesia Start: 1214 Anesthesia Stop: 1236    Procedure: ESOPHAGOGASTRODUODENOSCOPY (N/A ) Diagnosis:       Acute upper GI bleed      (Acute upper GI bleed [K92.2])    Surgeons: Tha Saleh MD Provider: Aquiles Bobby MD    Anesthesia Type: MAC ASA Status: 3 - Emergent          Anesthesia Type: MAC    Vitals  Vitals Value Taken Time   BP 99/63 10/03/21 1338   Temp 97.8 °F (36.6 °C) 10/03/21 1338   Pulse 88 10/03/21 1338   Resp 20 10/03/21 1338   SpO2 98 % 10/03/21 1338           Post Anesthesia Care and Evaluation    Patient location during evaluation: PACU  Patient participation: complete - patient participated  Level of consciousness: awake  Pain scale: See nurse's notes for pain score.  Pain management: adequate  Airway patency: patent  Anesthetic complications: No anesthetic complications  PONV Status: none  Cardiovascular status: acceptable  Respiratory status: acceptable  Hydration status: acceptable    Comments: Patient seen and examined postoperatively; vital signs stable; SpO2 greater than or equal to 90%; cardiopulmonary status stable; nausea/vomiting adequately controlled; pain adequately controlled; no apparent anesthesia complications; patient discharged from anesthesia care when discharge criteria were met

## 2021-10-04 ENCOUNTER — INPATIENT HOSPITAL (OUTPATIENT)
Dept: URBAN - METROPOLITAN AREA HOSPITAL 84 | Facility: HOSPITAL | Age: 36
End: 2021-10-04
Payer: COMMERCIAL

## 2021-10-04 DIAGNOSIS — K70.10 ALCOHOLIC HEPATITIS WITHOUT ASCITES: ICD-10-CM

## 2021-10-04 DIAGNOSIS — K92.0 HEMATEMESIS: ICD-10-CM

## 2021-10-04 DIAGNOSIS — F10.188 ALCOHOL ABUSE WITH OTHER ALCOHOL-INDUCED DISORDER: ICD-10-CM

## 2021-10-04 DIAGNOSIS — B18.2 CHRONIC VIRAL HEPATITIS C: ICD-10-CM

## 2021-10-04 DIAGNOSIS — I85.01 ESOPHAGEAL VARICES WITH BLEEDING: ICD-10-CM

## 2021-10-04 PROBLEM — K92.2 GASTROINTESTINAL HEMORRHAGE: Status: ACTIVE | Noted: 2021-10-04

## 2021-10-04 LAB
ALBUMIN SERPL-MCNC: 2.5 G/DL (ref 3.5–5.2)
ALBUMIN/GLOB SERPL: 1 G/DL
ALP SERPL-CCNC: 70 U/L (ref 39–117)
ALT SERPL W P-5'-P-CCNC: 19 U/L (ref 1–33)
ANION GAP SERPL CALCULATED.3IONS-SCNC: 10 MMOL/L (ref 5–15)
AST SERPL-CCNC: 51 U/L (ref 1–32)
BASOPHILS # BLD AUTO: 0 10*3/MM3 (ref 0–0.2)
BASOPHILS NFR BLD AUTO: 0.8 % (ref 0–1.5)
BILIRUB SERPL-MCNC: 2 MG/DL (ref 0–1.2)
BUN SERPL-MCNC: 9 MG/DL (ref 6–20)
BUN/CREAT SERPL: 16.7 (ref 7–25)
CALCIUM SPEC-SCNC: 7.5 MG/DL (ref 8.6–10.5)
CHLORIDE SERPL-SCNC: 109 MMOL/L (ref 98–107)
CO2 SERPL-SCNC: 22 MMOL/L (ref 22–29)
CREAT SERPL-MCNC: 0.54 MG/DL (ref 0.57–1)
DEPRECATED RDW RBC AUTO: 62.1 FL (ref 37–54)
EOSINOPHIL # BLD AUTO: 0.1 10*3/MM3 (ref 0–0.4)
EOSINOPHIL NFR BLD AUTO: 2.1 % (ref 0.3–6.2)
ERYTHROCYTE [DISTWIDTH] IN BLOOD BY AUTOMATED COUNT: 19 % (ref 12.3–15.4)
GFR SERPL CREATININE-BSD FRML MDRD: 129 ML/MIN/1.73
GLOBULIN UR ELPH-MCNC: 2.5 GM/DL
GLUCOSE SERPL-MCNC: 78 MG/DL (ref 65–99)
HCT VFR BLD AUTO: 22.3 % (ref 34–46.6)
HCT VFR BLD AUTO: 27.2 % (ref 34–46.6)
HGB BLD-MCNC: 7.1 G/DL (ref 12–15.9)
HGB BLD-MCNC: 8.6 G/DL (ref 12–15.9)
INR PPP: 1.61 (ref 0.93–1.1)
LYMPHOCYTES # BLD AUTO: 1.4 10*3/MM3 (ref 0.7–3.1)
LYMPHOCYTES NFR BLD AUTO: 39.8 % (ref 19.6–45.3)
MCH RBC QN AUTO: 29.4 PG (ref 26.6–33)
MCHC RBC AUTO-ENTMCNC: 32 G/DL (ref 31.5–35.7)
MCV RBC AUTO: 91.8 FL (ref 79–97)
MONOCYTES # BLD AUTO: 0.2 10*3/MM3 (ref 0.1–0.9)
MONOCYTES NFR BLD AUTO: 5.9 % (ref 5–12)
NEUTROPHILS NFR BLD AUTO: 1.8 10*3/MM3 (ref 1.7–7)
NEUTROPHILS NFR BLD AUTO: 51.4 % (ref 42.7–76)
NRBC BLD AUTO-RTO: 0 /100 WBC (ref 0–0.2)
PLATELET # BLD AUTO: 75 10*3/MM3 (ref 140–450)
PMV BLD AUTO: 8.6 FL (ref 6–12)
POTASSIUM SERPL-SCNC: 3.6 MMOL/L (ref 3.5–5.2)
PROT SERPL-MCNC: 5 G/DL (ref 6–8.5)
PROTHROMBIN TIME: 17.3 SECONDS (ref 9.6–11.7)
RBC # BLD AUTO: 2.43 10*6/MM3 (ref 3.77–5.28)
SODIUM SERPL-SCNC: 141 MMOL/L (ref 136–145)
WBC # BLD AUTO: 3.4 10*3/MM3 (ref 3.4–10.8)

## 2021-10-04 PROCEDURE — 94799 UNLISTED PULMONARY SVC/PX: CPT

## 2021-10-04 PROCEDURE — 85025 COMPLETE CBC W/AUTO DIFF WBC: CPT | Performed by: INTERNAL MEDICINE

## 2021-10-04 PROCEDURE — 85014 HEMATOCRIT: CPT | Performed by: INTERNAL MEDICINE

## 2021-10-04 PROCEDURE — 25010000002 CEFTRIAXONE PER 250 MG: Performed by: INTERNAL MEDICINE

## 2021-10-04 PROCEDURE — 99233 SBSQ HOSP IP/OBS HIGH 50: CPT | Performed by: NURSE PRACTITIONER

## 2021-10-04 PROCEDURE — 94640 AIRWAY INHALATION TREATMENT: CPT

## 2021-10-04 PROCEDURE — 85018 HEMOGLOBIN: CPT | Performed by: INTERNAL MEDICINE

## 2021-10-04 PROCEDURE — 25010000003 PHYTONADIONE 10 MG/ML SOLUTION: Performed by: INTERNAL MEDICINE

## 2021-10-04 PROCEDURE — 25010000002 NA FERRIC GLUC CPLX PER 12.5 MG: Performed by: INTERNAL MEDICINE

## 2021-10-04 PROCEDURE — 80053 COMPREHEN METABOLIC PANEL: CPT | Performed by: NURSE PRACTITIONER

## 2021-10-04 PROCEDURE — 85610 PROTHROMBIN TIME: CPT | Performed by: NURSE PRACTITIONER

## 2021-10-04 PROCEDURE — 25010000002 CALCIUM GLUCONATE 2-0.675 GM/100ML-% SOLUTION: Performed by: INTERNAL MEDICINE

## 2021-10-04 PROCEDURE — 99233 SBSQ HOSP IP/OBS HIGH 50: CPT | Performed by: INTERNAL MEDICINE

## 2021-10-04 PROCEDURE — 25010000002 ONDANSETRON PER 1 MG: Performed by: INTERNAL MEDICINE

## 2021-10-04 RX ORDER — IPRATROPIUM BROMIDE AND ALBUTEROL SULFATE 2.5; .5 MG/3ML; MG/3ML
3 SOLUTION RESPIRATORY (INHALATION)
Status: DISCONTINUED | OUTPATIENT
Start: 2021-10-04 | End: 2021-10-06 | Stop reason: HOSPADM

## 2021-10-04 RX ORDER — ACETAMINOPHEN 325 MG/1
650 TABLET ORAL EVERY 12 HOURS PRN
Status: DISCONTINUED | OUTPATIENT
Start: 2021-10-04 | End: 2021-10-06 | Stop reason: HOSPADM

## 2021-10-04 RX ORDER — PANTOPRAZOLE SODIUM 40 MG/10ML
40 INJECTION, POWDER, LYOPHILIZED, FOR SOLUTION INTRAVENOUS EVERY 12 HOURS SCHEDULED
Status: DISCONTINUED | OUTPATIENT
Start: 2021-10-04 | End: 2021-10-06

## 2021-10-04 RX ORDER — PHYTONADIONE 10 MG/ML
5 INJECTION, EMULSION INTRAMUSCULAR; INTRAVENOUS; SUBCUTANEOUS ONCE
Status: COMPLETED | OUTPATIENT
Start: 2021-10-04 | End: 2021-10-04

## 2021-10-04 RX ORDER — DIPHENHYDRAMINE HYDROCHLORIDE AND LIDOCAINE HYDROCHLORIDE AND ALUMINUM HYDROXIDE AND MAGNESIUM HYDRO
10 KIT EVERY 6 HOURS PRN
Status: DISCONTINUED | OUTPATIENT
Start: 2021-10-04 | End: 2021-10-06 | Stop reason: HOSPADM

## 2021-10-04 RX ORDER — CALCIUM GLUCONATE 20 MG/ML
2 INJECTION, SOLUTION INTRAVENOUS ONCE
Status: COMPLETED | OUTPATIENT
Start: 2021-10-04 | End: 2021-10-04

## 2021-10-04 RX ADMIN — NADOLOL 20 MG: 20 TABLET ORAL at 09:22

## 2021-10-04 RX ADMIN — IPRATROPIUM BROMIDE AND ALBUTEROL SULFATE 3 ML: 2.5; .5 SOLUTION RESPIRATORY (INHALATION) at 19:58

## 2021-10-04 RX ADMIN — ACETAMINOPHEN 650 MG: 325 TABLET, FILM COATED ORAL at 21:34

## 2021-10-04 RX ADMIN — SODIUM CHLORIDE 250 MG: 9 INJECTION, SOLUTION INTRAVENOUS at 12:42

## 2021-10-04 RX ADMIN — MIDODRINE HYDROCHLORIDE 10 MG: 5 TABLET ORAL at 12:48

## 2021-10-04 RX ADMIN — Medication 10 ML: at 09:22

## 2021-10-04 RX ADMIN — IPRATROPIUM BROMIDE AND ALBUTEROL SULFATE 3 ML: 2.5; .5 SOLUTION RESPIRATORY (INHALATION) at 11:48

## 2021-10-04 RX ADMIN — MIDODRINE HYDROCHLORIDE 10 MG: 5 TABLET ORAL at 09:22

## 2021-10-04 RX ADMIN — PANTOPRAZOLE SODIUM 40 MG: 40 INJECTION, POWDER, FOR SOLUTION INTRAVENOUS at 13:34

## 2021-10-04 RX ADMIN — DIPHENHYDRAMINE HYDROCHLORIDE AND LIDOCAINE HYDROCHLORIDE AND ALUMINUM HYDROXIDE AND MAGNESIUM HYDRO 10 ML: KIT at 21:35

## 2021-10-04 RX ADMIN — Medication 220 MG: at 09:23

## 2021-10-04 RX ADMIN — Medication 100 MG: at 09:22

## 2021-10-04 RX ADMIN — CEFTRIAXONE SODIUM 1 G: 1 INJECTION, POWDER, FOR SOLUTION INTRAMUSCULAR; INTRAVENOUS at 03:42

## 2021-10-04 RX ADMIN — PANTOPRAZOLE SODIUM 40 MG: 40 INJECTION, POWDER, FOR SOLUTION INTRAVENOUS at 20:35

## 2021-10-04 RX ADMIN — MIDODRINE HYDROCHLORIDE 10 MG: 5 TABLET ORAL at 17:04

## 2021-10-04 RX ADMIN — PANTOPRAZOLE SODIUM 8 MG/HR: 40 INJECTION, POWDER, FOR SOLUTION INTRAVENOUS at 08:05

## 2021-10-04 RX ADMIN — SODIUM CHLORIDE 1000 ML: 9 INJECTION, SOLUTION INTRAVENOUS at 11:00

## 2021-10-04 RX ADMIN — PHYTONADIONE 5 MG: 10 INJECTION, EMULSION INTRAMUSCULAR; INTRAVENOUS; SUBCUTANEOUS at 17:06

## 2021-10-04 RX ADMIN — ONDANSETRON 4 MG: 2 INJECTION INTRAMUSCULAR; INTRAVENOUS at 21:34

## 2021-10-04 RX ADMIN — CALCIUM GLUCONATE 2 G: 20 INJECTION, SOLUTION INTRAVENOUS at 12:23

## 2021-10-04 RX ADMIN — FOLIC ACID 1 MG: 1 TABLET ORAL at 09:22

## 2021-10-04 NOTE — PLAN OF CARE
Goal Outcome Evaluation:  Pt states she is beginning to feel better.  Symptoms have improved.  Will continue to monitor

## 2021-10-04 NOTE — PROGRESS NOTES
[facility] Medicine Services Daily Progress Note    Patient Name: Alondra Eduardo  : 1985  MRN: [id number]  Primary Care Physician:  Irene Henriquez MD  Date of admission: 10/3/2021      Subjective      Chief Complaint:vomiting     Alondra Eduardo is a very pleasant 36 y.o. female who presented to [facility] on 10/3/2021 complaining of vomiting bright red blood this evening with clots.  She has a past medical history of her cirrhosis with previous episodes of esophageal variceal bleeding.  Last episode was in 2021.  She also has a past medical history of hepatitis C untreated.  Denies alcohol use.  Reports she only had one episode of vomiting yesterday .  She reports her stool was darker than usual but may have swallowed blood.  Globin today is 9.5 which is stable from previous.  Her enzymes are improved from last admission in August with ALT 25 AST 52 and bilirubin 2.1.  Reports some mild abdominal pain rated 1.  Potassium today is 3.2.  She was given one-time dose Protonix IV in ED and prophylactic 1 g ceftriaxone IV and gastroenterology has been consulted for EGD.  Has medical history also includes diastolic CHF and untreated hepatitis C.  She reports being partially COVID-19 vaccinated.  COVID-19 ordered and pending.    10/4/21 patient seen and examined now hypotensive.  Hemoglobin dropped.  Will transfuse 2 packed RBC, give a liter of IV normal saline.  discussed with RN. Iron 36 iv iron oredered    Review of Systems   Constitutional: Positive for malaise/fatigue.   HENT: Negative.    Eyes: Negative.    Cardiovascular: Negative.    Respiratory: Negative.    Endocrine: Negative.    Hematologic/Lymphatic: Negative.    Skin: Negative.    Musculoskeletal: Negative.    Gastrointestinal: Negative.    Genitourinary: Negative.    Neurological: Negative.    Psychiatric/Behavioral: Negative.    Allergic/Immunologic: Negative.    All other systems reviewed and are  negative.     Objective      Vitals:   Temp:  [98 °F (36.7 °C)-98.7 °F (37.1 °C)] 98.2 °F (36.8 °C)  Heart Rate:  [74-81] 74  Resp:  [14-18] 18  BP: (86-98)/(46-63) 96/62    Physical Exam  Vitals and nursing note reviewed.   Constitutional:       General: She is not in acute distress.     Appearance: She is well-developed. She is not ill-appearing, toxic-appearing or diaphoretic.   HENT:      Head: Normocephalic and atraumatic.      Nose: Nose normal. No congestion or rhinorrhea.      Mouth/Throat:      Mouth: Mucous membranes are moist.      Pharynx: No oropharyngeal exudate.   Eyes:      General: No scleral icterus.        Right eye: No discharge.         Left eye: No discharge.      Extraocular Movements: Extraocular movements intact.      Conjunctiva/sclera: Conjunctivae normal.      Pupils: Pupils are equal, round, and reactive to light.   Neck:      Thyroid: No thyromegaly.      Vascular: No carotid bruit or JVD.      Trachea: No tracheal deviation.   Cardiovascular:      Rate and Rhythm: Normal rate and regular rhythm.      Pulses: Normal pulses.      Heart sounds: Normal heart sounds. No murmur heard.   No friction rub. No gallop.    Pulmonary:      Effort: Pulmonary effort is normal. No respiratory distress.      Breath sounds: Normal breath sounds. No stridor.   Abdominal:      General: Bowel sounds are normal. There is no distension.      Palpations: Abdomen is soft. There is no mass.      Tenderness: There is no abdominal tenderness. There is no guarding or rebound.      Hernia: No hernia is present.   Musculoskeletal:         General: No swelling or tenderness. Normal range of motion.      Cervical back: Normal range of motion and neck supple. No rigidity. No muscular tenderness.   Lymphadenopathy:      Cervical: No cervical adenopathy.   Skin:     General: Skin is warm and dry.      Coloration: Skin is pale. Skin is not jaundiced.      Findings: No bruising, erythema or rash.   Neurological:       General: No focal deficit present.      Mental Status: She is alert and oriented to person, place, and time. Mental status is at baseline.      Cranial Nerves: No cranial nerve deficit.      Motor: No abnormal muscle tone.   Psychiatric:         Mood and Affect: Mood normal.         Behavior: Behavior normal.         Thought Content: Thought content normal.         Judgment: Judgment normal.          Result Review    Result Review:  I have personally reviewed the results from the time of this admission to 10/4/2021 14:11 EDT and agree with these findings:  [x]  Laboratory  [x]  Microbiology  [x]  Radiology  [x]  EKG/Telemetry   []  Cardiology/Vascular   []  Pathology  []  Old records  []  Other:  Most notable findings include:hgl 7.1, inr 1.6  Assessment/Plan      Brief Patient Summary:  Alondra Eduardo is a 36 y.o. female who presented to [facility] on 10/3/2021 complaining of vomiting bright red blood this evening with clots.  She has a past medical history of her cirrhosis with previous episodes of esophageal variceal bleeding.  Last episode was in August 2021.  She also has a past medical history of hepatitis C untreated.  Denies alcohol use.  Reports she only had one episode of vomiting yesterday .  She reports her stool was darker than usual but may have swallowed blood.  Globin today is 9.5 which is stable from previous.  Her enzymes are improved from last admission in August with ALT 25 AST 52 and bilirubin 2.1.  Reports some mild abdominal pain rated 1.  Potassium today is 3.2    cefTRIAXone, 1 g, Intravenous, Q24H  ferric gluconate, 250 mg, Intravenous, Once  folic acid, 1 mg, Oral, Daily  ipratropium-albuterol, 3 mL, Nebulization, 4x Daily - RT  midodrine, 10 mg, Oral, TID AC  nadolol, 20 mg, Oral, Daily  octreotide, 50 mcg, Intravenous, Once  pantoprazole, 40 mg, Intravenous, Q12H  thiamine, 100 mg, Oral, Daily  zinc sulfate, 220 mg, Oral, Daily       octreotide (SandoSTATIN) infusion, 25  mcg/hr, Last Rate: 25 mcg/hr (10/03/21 2006)  sodium chloride, 100 mL/hr, Last Rate: 100 mL/hr (10/03/21 1715)       Active Hospital Problems:  Active Hospital Problems    Diagnosis    • Gastrointestinal hemorrhage    • Acute upper GI bleed    • Hypokalemia    • Esophageal varices (HCC)      Acute upper GI bleed secondary to esophageal varices with past medical history of liver cirrhosis, hemoglobin and LFTs stable from previous,   -40 mg IV Protonix twice daily,   -normal saline IV fluid hydration,   -n.p.o. status except ice chips,   -monitor H&H every 8 hours, type and cross,   -gastroenterology consulted> reports that she has been tolerating clear liquids overnight without nausea/vomiting.  Does complain of some mild throat discomfort with swallowing.  States that previously this resolved with [employer] mouthwash.  S/p EGD yesterday showing 3 cords of grade 3 esophageal varices with one cord with active bleeding s/p banding.  Hemoglobin 7.1 today from 8.5 yesterday.  Continue to monitor H/H.  Would not recommend transfusing PRBCs unless hemoglobin is less than 7.0 to avoid over transfusion which would make rebleeding much more likely.  Continue octreotide drip x48 more hours.  Continue IV PPI twice daily.  Continue Rocephin x4 more days.     Hypokalemia potassium 3.2, IV potassium replacement started magnesium pending     Liver cirrhosis, untreated hepatitis C, home meds unverified patient n.p.o. not reordered at this time     Diastolic CHF stable Home meds unverified at this time n.p.o. status not reordered meds at this time continuous cardiac monitoring     DVT prophylaxis:  Mechanical DVT prophylaxis orders are present.    CODE STATUS:    Code Status: CPR  Medical Interventions (Level of Support Prior to Arrest): Full      Disposition:  I expect patient to be discharged home.    This patient has been examined wearing appropriate Personal Protective Equipment and discussed with rn.  10/04/21      Electronically signed by Momo Palafox MD, 10/04/21, 14:11 EDT.  [facility] Hospitalist Team

## 2021-10-04 NOTE — NURSING NOTE
Call placed to GI in regards to med clarification per GI order. Therese responded. Ordered to give IV protonix continuous and repeated back.

## 2021-10-04 NOTE — PROGRESS NOTES
LOS: 0 days   Patient Care Team:  Irene Henriquez MD as PCP - General (Internal Medicine)  Jean Hernandez MD as Consulting Physician (Nephrology)      Subjective     Interval History:     Subjective: Patient with no new complaints.  Reports that she has been tolerating clear liquid diet.  Does report some dark stool overnight.  No bright red blood per rectum.  Denies any abdominal pain.  Does complain of some throat discomfort with swallowing.  States that previously this has resolved with Spring's Magic mouthwash.  No nausea/vomiting.      ROS:   No chest pain, shortness of breath, or cough.        Medication Review:     Current Facility-Administered Medications:   •  calcium gluconate 2-0.675 GM/100ML NACL IVPB, 2 g, Intravenous, Once, Momo Palafox MD  •  cefTRIAXone (ROCEPHIN) 1 g in sodium chloride 0.9 % 100 mL IVPB, 1 g, Intravenous, Q24H, Tha Saleh MD, Last Rate: 200 mL/hr at 10/04/21 0342, 1 g at 10/04/21 0342  •  ferric gluconate (FERRLECIT) 250 MG in sodium chloride 0.9% 100 mL IVPB, 250 mg, Intravenous, Once, Momo Palafox MD  •  First Mouthwash (Magic Mouthwash) 10 mL, 10 mL, Swish & Spit, Q6H PRN, Jeanne Zarate, APRN  •  folic acid (FOLVITE) tablet 1 mg, 1 mg, Oral, Daily, Kanu Beasley, DO, 1 mg at 10/04/21 0922  •  ipratropium-albuterol (DUO-NEB) nebulizer solution 3 mL, 3 mL, Nebulization, 4x Daily - RT, Momo Palafox MD  •  Magnesium Sulfate 2 gram Bolus, followed by 8 gram infusion (total Mg dose 10 grams)- Mg less than or equal to 1mg/dL, 2 g, Intravenous, PRN **OR** Magnesium Sulfate 2 gram / 50mL Infusion (GIVE X 3 BAGS TO EQUAL 6GM TOTAL DOSE) - Mg 1.1 - 1.5 mg/dl, 2 g, Intravenous, PRN **OR** Magnesium Sulfate 4 gram infusion- Mg 1.6-1.9 mg/dL, 4 g, Intravenous, PRN, Tha Saleh MD  •  midodrine (PROAMATINE) tablet 10 mg, 10 mg, Oral, TID ACRamos Jalaram, DO, 10 mg at 10/04/21 0922  •  nadolol (CORGARD) tablet 20 mg, 20 mg, Oral, Daily,  Kanu Beasley, , 20 mg at 10/04/21 0922  •  nitroglycerin (NITROSTAT) SL tablet 0.4 mg, 0.4 mg, Sublingual, Q5 Min PRN, Tha Saleh MD  •  octreotide (SANDOSTATIN) bolus from bag 5 mcg/mL 50 mcg, 50 mcg, Intravenous, Once **FOLLOWED BY** octreotide (sandoSTATIN) 500 mcg in sodium chloride 0.9 % 100 mL (5 mcg/mL) infusion, 25 mcg/hr, Intravenous, Continuous, Tha Saleh MD, Last Rate: 5 mL/hr at 10/03/21 2006, 25 mcg/hr at 10/03/21 2006  •  ondansetron (ZOFRAN) tablet 4 mg, 4 mg, Oral, Q6H PRN **OR** ondansetron (ZOFRAN) injection 4 mg, 4 mg, Intravenous, Q6H PRN, Tha Saleh MD  •  pantoprazole (PROTONIX) 40 mg in sodium chloride 0.9 % 100 mL (0.4 mg/mL) IVPB-MBP, 8 mg/hr, Intravenous, Continuous, Tha Saleh MD, Last Rate: 20 mL/hr at 10/04/21 0805, 8 mg/hr at 10/04/21 0805  •  potassium chloride 10 mEq in 100 mL IVPB, 10 mEq, Intravenous, Q1H PRN, Tha Saleh MD, Last Rate: 100 mL/hr at 10/03/21 0610, 10 mEq at 10/03/21 0610  •  potassium chloride 10 mEq in 100 mL IVPB, 10 mEq, Intravenous, Q1H PRN, Tha Saleh MD  •  sodium chloride 0.9 % bolus 1,000 mL, 1,000 mL, Intravenous, Once, Momo Palafox MD  •  [COMPLETED] Insert peripheral IV, , , Once **AND** sodium chloride 0.9 % flush 10 mL, 10 mL, Intravenous, PRN, Tha Saleh MD, 10 mL at 10/04/21 0922  •  sodium chloride 0.9 % infusion, 100 mL/hr, Intravenous, Continuous, Tha Saleh MD, Last Rate: 100 mL/hr at 10/03/21 1715, 100 mL/hr at 10/03/21 1715  •  thiamine (VITAMIN B-1) tablet 100 mg, 100 mg, Oral, Daily, Tha Saleh MD, 100 mg at 10/04/21 0922  •  zinc sulfate (ZINCATE) capsule 220 mg, 220 mg, Oral, Daily, Tha Saleh MD, 220 mg at 10/04/21 0923      Objective     Vital Signs  Vitals:    10/04/21 0127 10/04/21 0336 10/04/21 0419 10/04/21 0900   BP: 98/56 92/55 90/53 (!) 86/46   BP Location: Left arm Left leg Right leg Left arm   Patient Position: Sitting Lying Lying Lying   Pulse: 81 80  74    Resp: 17 18  14   Temp: 98 °F (36.7 °C) 98.2 °F (36.8 °C)     TempSrc: Oral Oral     SpO2: 94% 94%  91%   Weight:  69.9 kg (154 lb)     Height:           Physical Exam:     General Appearance:    Awake and alert, in no acute distress   Head:    Normocephalic, without obvious abnormality   Eyes:          Conjunctivae normal, anicteric sclera   Throat:   No oral lesions, no thrush, oral mucosa moist   Neck:   No adenopathy, supple, no JVD   Lungs:     respirations regular, even and unlabored   Abdomen:     Soft, non-tender, no rebound or guarding, non-distended   Rectal:     Deferred   Extremities:   No edema, no cyanosis   Skin:   No bruising or rash, no jaundice        Results Review:    CBC    Results from last 7 days   Lab Units 10/04/21  0610 10/03/21  2306 10/03/21  1658 10/03/21  1051 10/03/21  0414   WBC 10*3/mm3 3.40  --   --   --  4.60   HEMOGLOBIN g/dL 7.1* 8.5* 7.4* 8.0* 9.5*   PLATELETS 10*3/mm3 75*  --   --   --  105*     CMP   Results from last 7 days   Lab Units 10/04/21  0610 10/03/21  0426   SODIUM mmol/L 141 136   POTASSIUM mmol/L 3.6 3.2*   CHLORIDE mmol/L 109* 103   CO2 mmol/L 22.0 25.0   BUN mg/dL 9 7   CREATININE mg/dL 0.54* 0.54*   GLUCOSE mg/dL 78 116*   ALBUMIN g/dL 2.50* 2.70*   BILIRUBIN mg/dL 2.0* 2.1*   ALK PHOS U/L 70 105   AST (SGOT) U/L 51* 52*   ALT (SGPT) U/L 19 25   LIPASE U/L  --  47     Cr Clearance Estimated Creatinine Clearance: 137.7 mL/min (A) (by C-G formula based on SCr of 0.54 mg/dL (L)).  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 Ochoa) at least 3 columns of grade 3 varices extending to the midesophagus status post banding ×4. Erosive esophagitis. The GE junction Chronic appearing 9 mm prepyloric antral ulcer without stigmata of bleeding.    PLAN:  Patient reports that she has been tolerating clear liquids overnight without nausea/vomiting.  Does complain of some mild throat discomfort with swallowing.  States that previously this resolved with Spring's Magic mouthwash.  S/p EGD yesterday showing 3 cords of grade 3 esophageal varices with one cord with active bleeding s/p banding.  Hemoglobin 7.1 today from 8.5 yesterday.  Continue to monitor H/H.  Would not recommend transfusing PRBCs unless hemoglobin is less than 7.0 to avoid over transfusion which would make rebleeding much more likely.  Continue octreotide drip x48 more hours.  Continue IV PPI twice daily.  Continue Rocephin x4 more days.  Patient will need repeat EGD with banding in 4 weeks.  We will advance diet to full liquids.  Continue supportive care.      Electronically signed by SEGUN Otero, 10/04/21, 10:49 AM EDT.

## 2021-10-04 NOTE — PAYOR COMM NOTE
"[facility] PA form attached    Er admit   Observation order 10/3  Inpatient order 10/4/21    EGD: ESOPHAGOGASTRODUODENOSCOPY with esophageal variceal banding ligation     Labs: hgb 9.5>7.1  ------  [place]:  Gastrointestinal Bleeding, Upper (M-180)  Admission is indicated for 1 or more of the following  Variceal bleeding (eg, diagnosed on endoscopy)  ---  AUTHORIZATION PENDING:   PLEASE FAX OR CALL DETERMINATION TO CONTACT BELOW:     THANK YOU,    EDWIN Francis, RN  Utilization Review  [facility]  Phone: [phone]  Fax: [phone]      NPI: [id number]  Tax ID: [id number]    Margaux Eduardo TIA (36 y.o. Female)     Date of Birth Social Security Number Address Home Phone MRN    1985  [id number]  [address]  [place] [facility] [phone] [id number]    Adventism Marital Status          None Single       Admission Date Admission Type Admitting Provider Attending Provider Department, Room/Bed    10/3/21 Emergency Beasley, DO Vivienne Bobby Federico N, MD [facility] 3A MEDICAL INPATIENT, 311/1    Discharge Date Discharge Disposition Discharge Destination                       Attending Provider: Momo Palafox MD    Allergies: No Known Allergies    Isolation: None   Infection: None   Code Status: CPR    Ht: 160 cm (63\")   Wt: 69.9 kg (154 lb)    Admission Cmt: None   Principal Problem: None                Active Insurance as of 10/3/2021     Primary Coverage     Payor Plan Insurance Group Employer/Plan Group    [facility] [id number]      Payor Plan Address Payor Plan Phone Number Payor Plan Fax Number Effective Dates    [address]   8/1/2021 - None Entered    [facility] [id number]       Subscriber Name Subscriber Birth Date Member ID       MARGAUX EDUARDO TIA 1985 [id number]                 Emergency Contacts      (Rel.) Home Phone Work Phone Mobile Phone    Ivan Sheikh (Father) -- -- --               History & Physical      Beasley, " DO Kanu at 10/03/21 0536              [facility] Medicine Services      Patient Name: Alondra Eduardo  : 1985  MRN: [id number]  Primary Care Physician:  Irene Henriquez MD  Date of admission: 10/3/2021      Subjective      Chief Complaint: Vomiting blood    History of Present Illness: Alondra Eduardo is a very pleasant 36 y.o. female who presented to [facility] on 10/3/2021 complaining of vomiting bright red blood this evening with clots.  She has a past medical history of her cirrhosis with previous episodes of esophageal variceal bleeding.  Last episode was in 2021.  She also has a past medical history of hepatitis C untreated.  Denies alcohol use.  Reports she only had one episode of vomiting yesterday .  She reports her stool was darker than usual but may have swallowed blood.  Globin today is 9.5 which is stable from previous.  Her enzymes are improved from last admission in August with ALT 25 AST 52 and bilirubin 2.1.  Reports some mild abdominal pain rated 1.  Potassium today is 3.2.  She was given one-time dose Protonix IV in ED and prophylactic 1 g ceftriaxone IV and gastroenterology has been consulted for EGD.  Has medical history also includes diastolic CHF and untreated hepatitis C.  She reports being partially COVID-19 vaccinated.  COVID-19 ordered and pending.    Review of Systems   Constitutional: Negative.   HENT: Negative.    Eyes: Negative.    Cardiovascular: Negative.    Respiratory: Negative.    Endocrine: Negative.    Hematologic/Lymphatic: Negative.    Skin: Negative.    Musculoskeletal: Negative.    Gastrointestinal: Positive for hematochezia, melena and vomiting.   Genitourinary: Negative.    Neurological: Negative.    Psychiatric/Behavioral: Negative.    Allergic/Immunologic: Negative.         Personal History     Past Medical History:   Diagnosis Date   • Esophageal varices (HCC)    • Hepatitis C    • Hypotension    • Renal  insufficiency        Past Surgical History:   Procedure Laterality Date   • ENDOSCOPY N/A 8/10/2021    Procedure: ESOPHAGOGASTRODUODENOSCOPY AT BEDSIDE with banding x4;  Surgeon: Clark Ochoa MD;  Location: [facility] ENDOSCOPY;  Service: Gastroenterology;  Laterality: N/A;  post: esophageal varices with banding x4   • ESOPHAGEAL VARICES LIGATION     • HERNIA REPAIR         Family History: family history includes Cancer in her mother; Diabetes in her father; Heart disease in her paternal grandfather. Otherwise pertinent FHx was reviewed and not pertinent to current issue.    Social History:  reports that she has quit smoking. She smoked 0.00 packs per day. She has never used smokeless tobacco. She reports previous alcohol use. She reports previous drug use.    Home Medications:  Prior to Admission Medications     Prescriptions Last Dose Informant Patient Reported? Taking?    DPH-Lido-AlHydr-MgHydr-Simeth (FIRST-MOUTHWASH BLM MT)   Yes No    Apply 5 mL to the mouth or throat 4 (Four) Times a Day Before Meals & at Bedtime.    folic acid (FOLVITE) 1 MG tablet   No No    Take 1 tablet by mouth Daily.    furosemide (Lasix) 40 MG tablet   No No    Take 1 tablet by mouth Daily.    midodrine (PROAMATINE) 10 MG tablet   No No    Take 1 tablet by mouth Every 8 (Eight) Hours.    nadolol (CORGARD) 20 MG tablet   No No    Take 1 tablet by mouth Daily.    pantoprazole (PROTONIX) 40 MG EC tablet   No No    Take 1 tablet by mouth 2 (Two) Times a Day Before Meals.    spironolactone (Aldactone) 25 MG tablet   No No    Take 1 tablet by mouth Daily for 5 days.    thiamine (thiamine) 100 MG tablet tablet   No No    Take 1 tablet by mouth Daily.    zinc sulfate (ZINCATE) 220 (50 Zn) MG capsule   No No    Take 1 capsule by mouth Daily.            Allergies:  No Known Allergies    Objective      Vitals:   Temp:  [98.3 °F (36.8 °C)] 98.3 °F (36.8 °C)  Heart Rate:  [88] 88  Resp:  [18] 18  BP: (120)/(53) 120/53    Physical  Exam  Vitals reviewed.   Constitutional:       Appearance: Normal appearance. She is normal weight.   HENT:      Head: Normocephalic and atraumatic.      Right Ear: External ear normal.      Left Ear: External ear normal.      Nose: Nose normal.      Mouth/Throat:      Mouth: Mucous membranes are moist.   Eyes:      Extraocular Movements: Extraocular movements intact.      Pupils: Pupils are equal, round, and reactive to light.   Cardiovascular:      Rate and Rhythm: Normal rate and regular rhythm.      Heart sounds: Normal heart sounds.   Pulmonary:      Effort: Pulmonary effort is normal.      Breath sounds: Normal breath sounds.   Abdominal:      Palpations: Abdomen is soft.   Genitourinary:     Comments: Deferred  Musculoskeletal:         General: Normal range of motion.      Cervical back: Normal range of motion and neck supple.   Skin:     General: Skin is warm and dry.   Neurological:      General: No focal deficit present.      Mental Status: She is alert and oriented to person, place, and time.   Psychiatric:         Mood and Affect: Mood normal.         Behavior: Behavior normal.         Thought Content: Thought content normal.         Judgment: Judgment normal.          Result Review    Result Review:  I have personally reviewed the results from the time of this admission to 10/3/2021 05:37 EDT and agree with these findings:  [x]  Laboratory  []  Microbiology  []  Radiology  []  EKG/Telemetry   []  Cardiology/Vascular   []  Pathology  [x]  Old records  []  Other:  Most notable findings include: Clinical presentation hemoglobin stable at 9.5 past medical records indicating previous esophageal varices.    Assessment/Plan        Active Hospital Problems:  Active Hospital Problems    Diagnosis    • Acute upper GI bleed    • Esophageal varices (HCC)    • Hypokalemia      Plan:     Acute upper GI bleed secondary to esophageal varices with past medical history of liver cirrhosis, hemoglobin and LFTs stable from  previous, 40 mg IV Protonix twice daily, normal saline IV fluid hydration, n.p.o. status except ice chips, monitor H&H every 8 hours, type and cross, gastroenterology consulted    Hypokalemia potassium 3.2, IV potassium replacement started magnesium pending    Liver cirrhosis, untreated hepatitis C, home meds unverified patient n.p.o. not reordered at this time    Diastolic CHF stable Home meds unverified at this time n.p.o. status not reordered meds at this time continuous cardiac monitoring      DVT prophylaxis:  Mechanical DVT prophylaxis orders are present.    CODE STATUS:    Code Status: CPR  Medical Interventions (Level of Support Prior to Arrest): Full    Admission Status:  I believe this patient meets observation status.      I discussed the patient's findings and my recommendations with patient and family member at bedside.    This patient has been examined wearing appropriate Personal Protective Equipment     Signature: Electronically signed by SEGUN Rubalcava, 10/03/21, 5:57 AM EDT.      Attending attestation:    I have reviewed the LISBETH's note and agree with the documented findings and plan of care unless stated otherwise by my note.    36 y.o. female who presented to [facility] on 10/3/2021 complaining of vomiting bright red blood this evening with clots.  She has a past medical history of her cirrhosis with previous episodes of esophageal variceal bleeding.  Last episode was in August 2021.  She also has a past medical history of hepatitis C untreated.  Denies alcohol use.  Reports she only had one episode of vomiting yesterday .  She reports her stool was darker than usual but may have swallowed blood. Hemoglobin stable on admission compared to previous. Had banding done during last time but does not know how many. Discussed with GI, plan for EGD today. Keep n.p.o.    General: Awake, alert, NAD  Eyes: PERRL, EOMI, conjunctive are clear  Cardiovascular: Regular rate and rhythm, no  murmurs  Respiratory: Clear to auscultation bilaterally, no wheezing or rales, unlabored breathing  Abdomen: Soft, nontender, positive bowel sounds, no guarding  Neurologic: A&O, CN grossly intact, moves all extremities spontaneously  Musculoskeletal: Normal range of motion, no deformities  Skin: Warm, dry, intact    Assessment:  Upper GI bleed likely secondary to esophageal varices  Hep C liver cirrhosis  Hypokalemia  Chronic anemia    Plan:  -Hemoglobin stable, discussed with GI, plan for EGD today  -Continue PPI and ceftriaxone for SBP prophylaxis  -Consider octreotide if EGD being delayed  -Monitor hemoglobin  -Replace electrolytes  -GI consulted      Electronically signed by Kanu Beasley DO, 10/03/21, 9:34 AM EDT.      Electronically signed by Kanu Beasley DO at 10/03/21 3235          Emergency Department Notes      Ritchie Stone MD at 10/03/21 4129          Subjective   Chief complaint: Vomiting blood    36-year-old female with a history of esophageal varices presents after vomiting blood.  Patient states she vomited once tonight and there was dark red blood and clots.  She had some abdominal pain right after vomiting but states that has improved now.  She denies fever.  She denies any alleviating or exacerbating factors.  She states she has had GI bleeding in the past.  She states she has not been taking her PPI because she has had difficulty getting it refilled.      History provided by:  Patient      Review of Systems   Constitutional: Negative for fever.   HENT: Negative for congestion and sore throat.    Eyes: Negative for redness.   Respiratory: Negative for cough and shortness of breath.    Cardiovascular: Negative for chest pain.   Gastrointestinal: Positive for abdominal pain, nausea and vomiting. Negative for diarrhea.   Genitourinary: Negative for dysuria.   Musculoskeletal: Negative for back pain.   Skin: Negative for rash.   Neurological: Negative for dizziness and headaches.    Psychiatric/Behavioral: Negative for confusion.       Past Medical History:   Diagnosis Date   • Esophageal varices (CMS/HCC)    • Hepatitis C    • Hypotension    • Renal insufficiency        No Known Allergies    Past Surgical History:   Procedure Laterality Date   • ENDOSCOPY N/A 8/10/2021    Procedure: ESOPHAGOGASTRODUODENOSCOPY AT BEDSIDE with banding x4;  Surgeon: Clark Ochoa MD;  Location: [facility] ENDOSCOPY;  Service: Gastroenterology;  Laterality: N/A;  post: esophageal varices with banding x4   • ESOPHAGEAL VARICES LIGATION     • HERNIA REPAIR         Family History   Problem Relation Age of Onset   • Cancer Mother    • Diabetes Father    • Heart disease Paternal Grandfather        Social History     Socioeconomic History   • Marital status: Single     Spouse name: Not on file   • Number of children: Not on file   • Years of education: Not on file   • Highest education level: Not on file   Tobacco Use   • Smoking status: Former Smoker     Packs/day: 0.00   • Smokeless tobacco: Never Used   Vaping Use   • Vaping Use: Never used   Substance and Sexual Activity   • Alcohol use: Not Currently   • Drug use: Not Currently   • Sexual activity: Defer       /53 (BP Location: Left arm, Patient Position: Sitting)   Pulse 88   Temp 98.3 °F (36.8 °C) (Oral)   Resp 18   SpO2 99%       Objective   Physical Exam  Vitals and nursing note reviewed.   Constitutional:       Appearance: Normal appearance. She is well-developed.   HENT:      Head: Normocephalic and atraumatic.   Eyes:      Extraocular Movements: Extraocular movements intact.      Pupils: Pupils are equal, round, and reactive to light.   Cardiovascular:      Rate and Rhythm: Normal rate and regular rhythm.      Heart sounds: Normal heart sounds.   Pulmonary:      Effort: Pulmonary effort is normal. No respiratory distress.      Breath sounds: Normal breath sounds.   Abdominal:      General: Bowel sounds are normal.      Palpations: Abdomen  is soft.      Tenderness: There is no abdominal tenderness.   Musculoskeletal:         General: Normal range of motion.      Cervical back: Normal range of motion and neck supple.   Skin:     General: Skin is warm and dry.   Neurological:      General: No focal deficit present.      Mental Status: She is alert and oriented to person, place, and time.         Procedures          ED Course      Results for orders placed or performed during the hospital encounter of 10/03/21   Comprehensive Metabolic Panel    Specimen: Blood   Result Value Ref Range    Glucose 116 (H) 65 - 99 mg/dL    BUN 7 6 - 20 mg/dL    Creatinine 0.54 (L) 0.57 - 1.00 mg/dL    Sodium 136 136 - 145 mmol/L    Potassium 3.2 (L) 3.5 - 5.2 mmol/L    Chloride 103 98 - 107 mmol/L    CO2 25.0 22.0 - 29.0 mmol/L    Calcium 8.0 (L) 8.6 - 10.5 mg/dL    Total Protein 5.9 (L) 6.0 - 8.5 g/dL    Albumin 2.70 (L) 3.50 - 5.20 g/dL    ALT (SGPT) 25 1 - 33 U/L    AST (SGOT) 52 (H) 1 - 32 U/L    Alkaline Phosphatase 105 39 - 117 U/L    Total Bilirubin 2.1 (H) 0.0 - 1.2 mg/dL    eGFR Non African Amer 129 >60 mL/min/1.73    Globulin 3.2 gm/dL    A/G Ratio 0.8 g/dL    BUN/Creatinine Ratio 13.0 7.0 - 25.0    Anion Gap 8.0 5.0 - 15.0 mmol/L   Protime-INR    Specimen: Blood   Result Value Ref Range    Protime 16.0 (H) 9.6 - 11.7 Seconds    INR 1.48 (H) 0.93 - 1.10   aPTT    Specimen: Blood   Result Value Ref Range    PTT 34.6 (H) 24.0 - 31.0 seconds   Lipase    Specimen: Blood   Result Value Ref Range    Lipase 47 13 - 60 U/L   CBC Auto Differential    Specimen: Blood   Result Value Ref Range    WBC 4.60 3.40 - 10.80 10*3/mm3    RBC 3.20 (L) 3.77 - 5.28 10*6/mm3    Hemoglobin 9.5 (L) 12.0 - 15.9 g/dL    Hematocrit 28.9 (L) 34.0 - 46.6 %    MCV 90.2 79.0 - 97.0 fL    MCH 29.8 26.6 - 33.0 pg    MCHC 33.1 31.5 - 35.7 g/dL    RDW 18.4 (H) 12.3 - 15.4 %    RDW-SD 58.6 (H) 37.0 - 54.0 fl    MPV 8.2 6.0 - 12.0 fL    Platelets 105 (L) 140 - 450 10*3/mm3    Neutrophil % 66.5 42.7 -  76.0 %    Lymphocyte % 22.6 19.6 - 45.3 %    Monocyte % 7.8 5.0 - 12.0 %    Eosinophil % 1.7 0.3 - 6.2 %    Basophil % 1.4 0.0 - 1.5 %    Neutrophils, Absolute 3.10 1.70 - 7.00 10*3/mm3    Lymphocytes, Absolute 1.00 0.70 - 3.10 10*3/mm3    Monocytes, Absolute 0.40 0.10 - 0.90 10*3/mm3    Eosinophils, Absolute 0.10 0.00 - 0.40 10*3/mm3    Basophils, Absolute 0.10 0.00 - 0.20 10*3/mm3    nRBC 0.1 0.0 - 0.2 /100 WBC                                          MDM   Patient had the above evaluation.  Results were discussed with the patient.  IV access was established and the patient was given a dose of Protonix.  She was also given a dose of Rocephin for SBP prophylaxis.  She has had no vomiting in the emergency room.  White blood cell count is normal.  Hemoglobin is 9.5.  I discussed with the nurse practitioner on-call for the hospitalist and the patient will be admitted for further evaluation and management.      Final diagnoses:   Gastrointestinal hemorrhage, unspecified gastrointestinal hemorrhage type       ED Disposition  ED Disposition     ED Disposition Condition Comment    Decision to Admit  Level of Care: Med/Surg [1]   Diagnosis: Acute upper GI bleed [561110]   Admitting Physician: HEIDI KAPOOR [[id number]]   Bed Request Comments: cardiac monitor            No follow-up provider specified.       Medication List      No changes were made to your prescriptions during this visit.          Ritchie Stone MD  10/03/21 0532      Electronically signed by Ritchie Stone MD at 10/03/21 0532         Facility-Administered Medications as of 10/4/2021   Medication Dose Route Frequency Provider Last Rate Last Admin   • [COMPLETED] albumin human 5 % solution 500 mL  500 mL Intravenous Once Coco Pedroza APRN   500 mL at 10/03/21 3433   • [COMPLETED] calcium gluconate 2-0.675 GM/100ML NACL IVPB  2 g Intravenous Once Momo Palafox MD   2 g at 10/04/21 1223   • cefTRIAXone (ROCEPHIN) 1 g in sodium chloride 0.9 % 100 mL  IVPB  1 g Intravenous Q24H Tha Saleh  mL/hr at 10/04/21 0342 1 g at 10/04/21 0342   • [COMPLETED] cefTRIAXone (ROCEPHIN) in SWFI 1 gram/10ml IV PUSH syringe  1 g Intravenous Once Ritchie Stone MD   1 g at 10/03/21 0441   • [COMPLETED] ferric gluconate (FERRLECIT) 250 MG in sodium chloride 0.9% 100 mL IVPB  250 mg Intravenous Once Momo Palafox MD 50 mL/hr at 10/04/21 1242 250 mg at 10/04/21 1242   • First Mouthwash (Magic Mouthwash) 10 mL  10 mL Swish & Spit Q6H PRN Jeanne Zarate APRN       • folic acid (FOLVITE) tablet 1 mg  1 mg Oral Daily Kanu Beasley DO   1 mg at 10/04/21 0922   • ipratropium-albuterol (DUO-NEB) nebulizer solution 3 mL  3 mL Nebulization 4x Daily - RT Momo Palafox MD   3 mL at 10/04/21 1148   • Magnesium Sulfate 2 gram Bolus, followed by 8 gram infusion (total Mg dose 10 grams)- Mg less than or equal to 1mg/dL  2 g Intravenous PRN Tha Saleh MD        Or   • Magnesium Sulfate 2 gram / 50mL Infusion (GIVE X 3 BAGS TO EQUAL 6GM TOTAL DOSE) - Mg 1.1 - 1.5 mg/dl  2 g Intravenous PRN Tha Saleh MD        Or   • Magnesium Sulfate 4 gram infusion- Mg 1.6-1.9 mg/dL  4 g Intravenous PRN Tha Saleh MD       • midodrine (PROAMATINE) tablet 10 mg  10 mg Oral TID AC Kanu Beasley DO   10 mg at 10/04/21 1248   • nadolol (CORGARD) tablet 20 mg  20 mg Oral Daily Kanu Beasley DO   20 mg at 10/04/21 0922   • nitroglycerin (NITROSTAT) SL tablet 0.4 mg  0.4 mg Sublingual Q5 Min PRN Tha Saleh MD       • octreotide (SANDOSTATIN) bolus from bag 5 mcg/mL 50 mcg  50 mcg Intravenous Once Tha Saleh MD        Followed by   • octreotide (sandoSTATIN) 500 mcg in sodium chloride 0.9 % 100 mL (5 mcg/mL) infusion  25 mcg/hr Intravenous Continuous Tha Saleh MD 5 mL/hr at 10/03/21 2006 25 mcg/hr at 10/03/21 2006   • ondansetron (ZOFRAN) tablet 4 mg  4 mg Oral Q6H PRN Tha Saleh MD        Or   • ondansetron (ZOFRAN) injection 4 mg  4 mg  Intravenous Q6H PRN Tha Saleh MD       • pantoprazole (PROTONIX) injection 40 mg  40 mg Intravenous Q12H Jeanne Zarate APRN   40 mg at 10/04/21 1334   • [COMPLETED] pantoprazole (PROTONIX) injection 80 mg  80 mg Intravenous Once Ritchie Stone MD   80 mg at 10/03/21 0426   • phytonadione (AQUA-MEPHYTON, VITAMIN K) injection 5 mg  5 mg Subcutaneous Once Momo Palafox MD       • potassium chloride 10 mEq in 100 mL IVPB  10 mEq Intravenous Q1H PRN Tha Saleh  mL/hr at 10/03/21 0610 10 mEq at 10/03/21 0610   • potassium chloride 10 mEq in 100 mL IVPB  10 mEq Intravenous Q1H PRN Tha Saleh MD       • [COMPLETED] sodium chloride 0.9 % bolus 1,000 mL  1,000 mL Intravenous Once Momo Palafox MD 4,000 mL/hr at 10/04/21 1100 1,000 mL at 10/04/21 1100   • sodium chloride 0.9 % flush 10 mL  10 mL Intravenous PRN Tha Saleh MD   10 mL at 10/04/21 0922   • sodium chloride 0.9 % infusion  100 mL/hr Intravenous Continuous Tha Saleh  mL/hr at 10/03/21 1715 100 mL/hr at 10/03/21 1715   • thiamine (VITAMIN B-1) tablet 100 mg  100 mg Oral Daily Tha Saleh MD   100 mg at 10/04/21 0922   • zinc sulfate (ZINCATE) capsule 220 mg  220 mg Oral Daily Tha Saleh MD   220 mg at 10/04/21 0923        Operative/Procedure Notes (last 48 hours) (Notes from 10/02/21 1631 through 10/04/21 1631)      Tha Saleh MD at 10/03/21 1222        ESOPHAGOGASTRODUODENOSCOPY Procedure Report    Patient Name:  Alondra Eduardo  YOB: 1985    Date of Surgery:  10/3/2021     Pre-Op Diagnosis:  Acute upper GI bleed [K92.2]       Postop diagnosis:  Esophageal variceal bleed    Procedure/CPT® Codes:      Procedure(s):  ESOPHAGOGASTRODUODENOSCOPY with esophageal variceal banding ligation    Staff:  Surgeon(s):  Tha Saleh MD      Anesthesia: Monitored Anesthesia Care    Description of Procedure:  A description of the procedure as well as risks, benefits and alternative  methods were explained to the patient who voiced understanding and signed the corresponding consent form. A physical exam was performed and vital signs were monitored throughout the procedure.    An upper GI endoscope was placed into the mouth and proceeded through the esophagus, stomach and second portion of the duodenum without difficulty. The scope was then retroflexed and the fundus was visualized. The procedure was not difficult and there were no immediate complications.  There was no blood loss.    Impression:  1.  3 cords of F3 varices in the distal third of the esophagus with one cord having a nipple sign on it with a blood clot over it. As soon as suction was placed on this the nipple ruptured and caused a red out on the screen from rapid bleeding.  A band was placed over this successfully with complete hemostasis achieved.  In fear that this band would pop off, no other bands were placed near it.  One other band was placed 2 cm proximal and on the opposite wall.  2.  Blood filling much of the stomach limiting the view.  3.  Normal duodenal mucosa with blood filling the duodenal bulb      Recommendations:  Octreotide drip x72 hours  IV every 12 hours PPI x72 hours  Ceftriaxone x5 days  Repeat EGD with banding in 4 weeks      Tha Saleh MD     Date: 10/3/2021    Time: 12:31 EDT        Electronically signed by Tha Saleh MD at 10/03/21 1234          Physician Progress Notes (last 24 hours) (Notes from 10/03/21 1631 through 10/04/21 1631)      Momo Palafox MD at 10/04/21 1411              [facility] Medicine Services Daily Progress Note    Patient Name: Alondra Eduardo  : 1985  MRN: [id number]  Primary Care Physician:  Irene Henriquez MD  Date of admission: 10/3/2021      Subjective      Chief Complaint:vomiting     Alondra Eduardo is a very pleasant 36 y.o. female who presented to [facility] on 10/3/2021 complaining of vomiting bright red blood  this evening with clots.  She has a past medical history of her cirrhosis with previous episodes of esophageal variceal bleeding.  Last episode was in August 2021.  She also has a past medical history of hepatitis C untreated.  Denies alcohol use.  Reports she only had one episode of vomiting yesterday .  She reports her stool was darker than usual but may have swallowed blood.  Globin today is 9.5 which is stable from previous.  Her enzymes are improved from last admission in August with ALT 25 AST 52 and bilirubin 2.1.  Reports some mild abdominal pain rated 1.  Potassium today is 3.2.  She was given one-time dose Protonix IV in ED and prophylactic 1 g ceftriaxone IV and gastroenterology has been consulted for EGD.  Has medical history also includes diastolic CHF and untreated hepatitis C.  She reports being partially COVID-19 vaccinated.  COVID-19 ordered and pending.    10/4/21 patient seen and examined now hypotensive.  Hemoglobin dropped.  Will transfuse 2 packed RBC, give a liter of IV normal saline.  discussed with RN. Iron 36 iv iron oredered    Review of Systems   Constitutional: Positive for malaise/fatigue.   HENT: Negative.    Eyes: Negative.    Cardiovascular: Negative.    Respiratory: Negative.    Endocrine: Negative.    Hematologic/Lymphatic: Negative.    Skin: Negative.    Musculoskeletal: Negative.    Gastrointestinal: Negative.    Genitourinary: Negative.    Neurological: Negative.    Psychiatric/Behavioral: Negative.    Allergic/Immunologic: Negative.    All other systems reviewed and are negative.     Objective      Vitals:   Temp:  [98 °F (36.7 °C)-98.7 °F (37.1 °C)] 98.2 °F (36.8 °C)  Heart Rate:  [74-81] 74  Resp:  [14-18] 18  BP: (86-98)/(46-63) 96/62    Physical Exam  Vitals and nursing note reviewed.   Constitutional:       General: She is not in acute distress.     Appearance: She is well-developed. She is not ill-appearing, toxic-appearing or diaphoretic.   HENT:      Head: Normocephalic  and atraumatic.      Nose: Nose normal. No congestion or rhinorrhea.      Mouth/Throat:      Mouth: Mucous membranes are moist.      Pharynx: No oropharyngeal exudate.   Eyes:      General: No scleral icterus.        Right eye: No discharge.         Left eye: No discharge.      Extraocular Movements: Extraocular movements intact.      Conjunctiva/sclera: Conjunctivae normal.      Pupils: Pupils are equal, round, and reactive to light.   Neck:      Thyroid: No thyromegaly.      Vascular: No carotid bruit or JVD.      Trachea: No tracheal deviation.   Cardiovascular:      Rate and Rhythm: Normal rate and regular rhythm.      Pulses: Normal pulses.      Heart sounds: Normal heart sounds. No murmur heard.   No friction rub. No gallop.    Pulmonary:      Effort: Pulmonary effort is normal. No respiratory distress.      Breath sounds: Normal breath sounds. No stridor.   Abdominal:      General: Bowel sounds are normal. There is no distension.      Palpations: Abdomen is soft. There is no mass.      Tenderness: There is no abdominal tenderness. There is no guarding or rebound.      Hernia: No hernia is present.   Musculoskeletal:         General: No swelling or tenderness. Normal range of motion.      Cervical back: Normal range of motion and neck supple. No rigidity. No muscular tenderness.   Lymphadenopathy:      Cervical: No cervical adenopathy.   Skin:     General: Skin is warm and dry.      Coloration: Skin is pale. Skin is not jaundiced.      Findings: No bruising, erythema or rash.   Neurological:      General: No focal deficit present.      Mental Status: She is alert and oriented to person, place, and time. Mental status is at baseline.      Cranial Nerves: No cranial nerve deficit.      Motor: No abnormal muscle tone.   Psychiatric:         Mood and Affect: Mood normal.         Behavior: Behavior normal.         Thought Content: Thought content normal.         Judgment: Judgment normal.          Result Review     Result Review:  I have personally reviewed the results from the time of this admission to 10/4/2021 14:11 EDT and agree with these findings:  [x]  Laboratory  [x]  Microbiology  [x]  Radiology  [x]  EKG/Telemetry   []  Cardiology/Vascular   []  Pathology  []  Old records  []  Other:  Most notable findings include:hgl 7.1, inr 1.6  Assessment/Plan      Brief Patient Summary:  Alondra Eduardo is a 36 y.o. female who presented to [facility] on 10/3/2021 complaining of vomiting bright red blood this evening with clots.  She has a past medical history of her cirrhosis with previous episodes of esophageal variceal bleeding.  Last episode was in August 2021.  She also has a past medical history of hepatitis C untreated.  Denies alcohol use.  Reports she only had one episode of vomiting yesterday .  She reports her stool was darker than usual but may have swallowed blood.  Globin today is 9.5 which is stable from previous.  Her enzymes are improved from last admission in August with ALT 25 AST 52 and bilirubin 2.1.  Reports some mild abdominal pain rated 1.  Potassium today is 3.2    cefTRIAXone, 1 g, Intravenous, Q24H  ferric gluconate, 250 mg, Intravenous, Once  folic acid, 1 mg, Oral, Daily  ipratropium-albuterol, 3 mL, Nebulization, 4x Daily - RT  midodrine, 10 mg, Oral, TID AC  nadolol, 20 mg, Oral, Daily  octreotide, 50 mcg, Intravenous, Once  pantoprazole, 40 mg, Intravenous, Q12H  thiamine, 100 mg, Oral, Daily  zinc sulfate, 220 mg, Oral, Daily       octreotide (SandoSTATIN) infusion, 25 mcg/hr, Last Rate: 25 mcg/hr (10/03/21 2006)  sodium chloride, 100 mL/hr, Last Rate: 100 mL/hr (10/03/21 1715)       Active Hospital Problems:  Active Hospital Problems    Diagnosis    • Gastrointestinal hemorrhage    • Acute upper GI bleed    • Hypokalemia    • Esophageal varices (HCC)      Acute upper GI bleed secondary to esophageal varices with past medical history of liver cirrhosis, hemoglobin and LFTs stable from  previous,   -40 mg IV Protonix twice daily,   -normal saline IV fluid hydration,   -n.p.o. status except ice chips,   -monitor H&H every 8 hours, type and cross,   -gastroenterology consulted> reports that she has been tolerating clear liquids overnight without nausea/vomiting.  Does complain of some mild throat discomfort with swallowing.  States that previously this resolved with Spring's Magic mouthwash.  S/p EGD yesterday showing 3 cords of grade 3 esophageal varices with one cord with active bleeding s/p banding.  Hemoglobin 7.1 today from 8.5 yesterday.  Continue to monitor H/H.  Would not recommend transfusing PRBCs unless hemoglobin is less than 7.0 to avoid over transfusion which would make rebleeding much more likely.  Continue octreotide drip x48 more hours.  Continue IV PPI twice daily.  Continue Rocephin x4 more days.     Hypokalemia potassium 3.2, IV potassium replacement started magnesium pending     Liver cirrhosis, untreated hepatitis C, home meds unverified patient n.p.o. not reordered at this time     Diastolic CHF stable Home meds unverified at this time n.p.o. status not reordered meds at this time continuous cardiac monitoring     DVT prophylaxis:  Mechanical DVT prophylaxis orders are present.    CODE STATUS:    Code Status: CPR  Medical Interventions (Level of Support Prior to Arrest): Full      Disposition:  I expect patient to be discharged home.    This patient has been examined wearing appropriate Personal Protective Equipment and discussed with rn. 10/04/21      Electronically signed by Momo Palafox MD, 10/04/21, 14:11 EDT.  [facility] Hospitalist Team             Electronically signed by Momo Palafox MD at 10/04/21 1418     Jeanne Zarate APRN at 10/04/21 1049     Attestation signed by Tha Saleh MD at 10/04/21 1601    I have reviewed this documentation and agree.  Patient with no new complaints.  She been tolerating clear liquid diet does report some dark stool  overnight.  But no bright red blood per rectum.  Physical exam  Abdomen-soft, nontender, nondistended  Otherwise benign physical exam  Labs-noted  Assessment plan  Hematemesis-status post EGD with band yesterday.  Appears to be doing well.  Hemoglobin 7.1.  Would hold off on transfusing unless hemoglobin less than 7.  Continue octreotide drip and IV PPI.  Continue ceftriaxone  Alcohol and hep C cirrhosis-treatment naïve.  Need to follow-up in clinic to discuss treatment.                     LOS: 0 days   Patient Care Team:  Irene Henriquez MD as PCP - General (Internal Medicine)  Jean Hernandez MD as Consulting Physician (Nephrology)      Subjective     Interval History:     Subjective: Patient with no new complaints.  Reports that she has been tolerating clear liquid diet.  Does report some dark stool overnight.  No bright red blood per rectum.  Denies any abdominal pain.  Does complain of some throat discomfort with swallowing.  States that previously this has resolved with Spring's Magic mouthwash.  No nausea/vomiting.      ROS:   No chest pain, shortness of breath, or cough.        Medication Review:     Current Facility-Administered Medications:   •  calcium gluconate 2-0.675 GM/100ML NACL IVPB, 2 g, Intravenous, Once, Momo Palafox MD  •  cefTRIAXone (ROCEPHIN) 1 g in sodium chloride 0.9 % 100 mL IVPB, 1 g, Intravenous, Q24H, Tha Saleh MD, Last Rate: 200 mL/hr at 10/04/21 0342, 1 g at 10/04/21 0342  •  ferric gluconate (FERRLECIT) 250 MG in sodium chloride 0.9% 100 mL IVPB, 250 mg, Intravenous, Once, Momo Palafox MD  •  First Mouthwash (Magic Mouthwash) 10 mL, 10 mL, Swish & Spit, Q6H PRN, Jeanne Zarate, APRN  •  folic acid (FOLVITE) tablet 1 mg, 1 mg, Oral, Daily, Kanu Beasley, DO, 1 mg at 10/04/21 0922  •  ipratropium-albuterol (DUO-NEB) nebulizer solution 3 mL, 3 mL, Nebulization, 4x Daily - RT, Momo Palafox MD  •  Magnesium Sulfate 2 gram Bolus, followed  by 8 gram infusion (total Mg dose 10 grams)- Mg less than or equal to 1mg/dL, 2 g, Intravenous, PRN **OR** Magnesium Sulfate 2 gram / 50mL Infusion (GIVE X 3 BAGS TO EQUAL 6GM TOTAL DOSE) - Mg 1.1 - 1.5 mg/dl, 2 g, Intravenous, PRN **OR** Magnesium Sulfate 4 gram infusion- Mg 1.6-1.9 mg/dL, 4 g, Intravenous, PRN, Tha Saleh MD  •  midodrine (PROAMATINE) tablet 10 mg, 10 mg, Oral, TID AC, Kanu Beasley DO, 10 mg at 10/04/21 0922  •  nadolol (CORGARD) tablet 20 mg, 20 mg, Oral, Daily, Kanu Beasley DO, 20 mg at 10/04/21 0922  •  nitroglycerin (NITROSTAT) SL tablet 0.4 mg, 0.4 mg, Sublingual, Q5 Min PRN, Tha Saleh MD  •  octreotide (SANDOSTATIN) bolus from bag 5 mcg/mL 50 mcg, 50 mcg, Intravenous, Once **FOLLOWED BY** octreotide (sandoSTATIN) 500 mcg in sodium chloride 0.9 % 100 mL (5 mcg/mL) infusion, 25 mcg/hr, Intravenous, Continuous, Tha Saleh MD, Last Rate: 5 mL/hr at 10/03/21 2006, 25 mcg/hr at 10/03/21 2006  •  ondansetron (ZOFRAN) tablet 4 mg, 4 mg, Oral, Q6H PRN **OR** ondansetron (ZOFRAN) injection 4 mg, 4 mg, Intravenous, Q6H PRN, Tha Saleh MD  •  pantoprazole (PROTONIX) 40 mg in sodium chloride 0.9 % 100 mL (0.4 mg/mL) IVPB-MBP, 8 mg/hr, Intravenous, Continuous, Tha Saleh MD, Last Rate: 20 mL/hr at 10/04/21 0805, 8 mg/hr at 10/04/21 0805  •  potassium chloride 10 mEq in 100 mL IVPB, 10 mEq, Intravenous, Q1H PRN, Tha Saleh MD, Last Rate: 100 mL/hr at 10/03/21 0610, 10 mEq at 10/03/21 0610  •  potassium chloride 10 mEq in 100 mL IVPB, 10 mEq, Intravenous, Q1H PRN, Tha Saleh MD  •  sodium chloride 0.9 % bolus 1,000 mL, 1,000 mL, Intravenous, Once, Momo Palafox MD  •  [COMPLETED] Insert peripheral IV, , , Once **AND** sodium chloride 0.9 % flush 10 mL, 10 mL, Intravenous, PRN, Tha Saleh MD, 10 mL at 10/04/21 0922  •  sodium chloride 0.9 % infusion, 100 mL/hr, Intravenous, Continuous, Tha Saleh MD, Last Rate: 100 mL/hr at 10/03/21 1715,  100 mL/hr at 10/03/21 1715  •  thiamine (VITAMIN B-1) tablet 100 mg, 100 mg, Oral, Daily, Tha Saleh MD, 100 mg at 10/04/21 0922  •  zinc sulfate (ZINCATE) capsule 220 mg, 220 mg, Oral, Daily, Tha Saleh MD, 220 mg at 10/04/21 0923      Objective     Vital Signs  Vitals:    10/04/21 0127 10/04/21 0336 10/04/21 0419 10/04/21 0900   BP: 98/56 92/55 90/53 (!) 86/46   BP Location: Left arm Left leg Right leg Left arm   Patient Position: Sitting Lying Lying Lying   Pulse: 81 80  74   Resp: 17 18  14   Temp: 98 °F (36.7 °C) 98.2 °F (36.8 °C)     TempSrc: Oral Oral     SpO2: 94% 94%  91%   Weight:  69.9 kg (154 lb)     Height:           Physical Exam:     General Appearance:    Awake and alert, in no acute distress   Head:    Normocephalic, without obvious abnormality   Eyes:          Conjunctivae normal, anicteric sclera   Throat:   No oral lesions, no thrush, oral mucosa moist   Neck:   No adenopathy, supple, no JVD   Lungs:     respirations regular, even and unlabored   Abdomen:     Soft, non-tender, no rebound or guarding, non-distended   Rectal:     Deferred   Extremities:   No edema, no cyanosis   Skin:   No bruising or rash, no jaundice        Results Review:    CBC    Results from last 7 days   Lab Units 10/04/21  0610 10/03/21  2306 10/03/21  1658 10/03/21  1051 10/03/21  0414   WBC 10*3/mm3 3.40  --   --   --  4.60   HEMOGLOBIN g/dL 7.1* 8.5* 7.4* 8.0* 9.5*   PLATELETS 10*3/mm3 75*  --   --   --  105*     CMP   Results from last 7 days   Lab Units 10/04/21  0610 10/03/21  0426   SODIUM mmol/L 141 136   POTASSIUM mmol/L 3.6 3.2*   CHLORIDE mmol/L 109* 103   CO2 mmol/L 22.0 25.0   BUN mg/dL 9 7   CREATININE mg/dL 0.54* 0.54*   GLUCOSE mg/dL 78 116*   ALBUMIN g/dL 2.50* 2.70*   BILIRUBIN mg/dL 2.0* 2.1*   ALK PHOS U/L 70 105   AST (SGOT) U/L 51* 52*   ALT (SGPT) U/L 19 25   LIPASE U/L  --  47     Cr Clearance Estimated Creatinine Clearance: 137.7 mL/min (A) (by C-G formula based on SCr of 0.54 mg/dL  (L)).  Coag   Results from last 7 days   Lab Units 10/04/21  0610 10/03/21  0426   INR  1.61* 1.48*   APTT seconds  --  34.6*     HbA1C   Lab Results   Component Value Date    HGBA1C 5.1 03/26/2019     Blood Glucose No results found for: POCGLU  Infection     UA      Radiology(recent) No radiology results for the last day       Assessment/Plan     ASSESSMENT:  -Hematemesis -s/p EGD yesterday  -Alcohol and hepatitis C cirrhosis (treatment naïve) with bleeding esophageal varices and ascites  -Elevated LFTs and INR due to above  -Normocytic anemia-chronic and related to hematemesis  -Thrombocytopenia rated to cirrhosis  -History of alcohol abuse, sober  -CHF  -Hypokalemia     8/10/2021 EGD (Dr. Ochoa) at least 3 columns of grade 3 varices extending to the midesophagus status post banding ×4. Erosive esophagitis. The GE junction Chronic appearing 9 mm prepyloric antral ulcer without stigmata of bleeding.    PLAN:  Patient reports that she has been tolerating clear liquids overnight without nausea/vomiting.  Does complain of some mild throat discomfort with swallowing.  States that previously this resolved with Spring's Magic mouthwash.  S/p EGD yesterday showing 3 cords of grade 3 esophageal varices with one cord with active bleeding s/p banding.  Hemoglobin 7.1 today from 8.5 yesterday.  Continue to monitor H/H.  Would not recommend transfusing PRBCs unless hemoglobin is less than 7.0 to avoid over transfusion which would make rebleeding much more likely.  Continue octreotide drip x48 more hours.  Continue IV PPI twice daily.  Continue Rocephin x4 more days.  Patient will need repeat EGD with banding in 4 weeks.  We will advance diet to full liquids.  Continue supportive care.      Electronically signed by SEGUN Otero, 10/04/21, 10:49 AM EDT.             Electronically signed by Tha Saleh MD at 10/04/21 5625

## 2021-10-04 NOTE — PLAN OF CARE
Goal Outcome Evaluation:  Plan of Care Reviewed With: patient        Progress: no change  Outcome Summary: Pt slept through half the night. Call placed to hospitalist regarding pt BP decrease, see orders. Pt currently receiving IVF as well as antibotics. No other complaints at this time. Will continue to monitor.

## 2021-10-04 NOTE — CASE MANAGEMENT/SOCIAL WORK
Continued Stay Note  SON[facility] Oliva     Patient Name: Alondra Eduardo  MRN: [id number]  Today's Date: 10/4/2021    Admit Date: 10/3/2021    Discharge Plan     Row Name 10/04/21 1243       Plan    Plan  Patient refuses CM assessment at this time due. Needs cm assessment    Plan Comments  To patient room and began assessment. Patient is currently very weak, hypotensive and requiring fluid bolus and PRBC. She asks me to come back at another time. Patient with possible transfer noted. Barriers to dc: IV abx and IV PPI            Casie Richard RN   Met with patient in room wearing PPE: mask, face shield/goggles, gloves, gown.      Maintained distance greater than six feet and spent less than 15 minutes in the room.

## 2021-10-05 ENCOUNTER — INPATIENT HOSPITAL (OUTPATIENT)
Dept: URBAN - METROPOLITAN AREA HOSPITAL 84 | Facility: HOSPITAL | Age: 36
End: 2021-10-05
Payer: COMMERCIAL

## 2021-10-05 DIAGNOSIS — F10.10 ALCOHOL ABUSE, UNCOMPLICATED: ICD-10-CM

## 2021-10-05 DIAGNOSIS — I85.00 ESOPHAGEAL VARICES WITHOUT BLEEDING: ICD-10-CM

## 2021-10-05 DIAGNOSIS — K70.31 ALCOHOLIC CIRRHOSIS OF LIVER WITH ASCITES: ICD-10-CM

## 2021-10-05 DIAGNOSIS — D64.89 OTHER SPECIFIED ANEMIAS: ICD-10-CM

## 2021-10-05 DIAGNOSIS — K70.11 ALCOHOLIC HEPATITIS WITH ASCITES: ICD-10-CM

## 2021-10-05 DIAGNOSIS — K92.0 HEMATEMESIS: ICD-10-CM

## 2021-10-05 LAB
ALBUMIN SERPL-MCNC: 2.2 G/DL (ref 3.5–5.2)
ALBUMIN/GLOB SERPL: 1 G/DL
ALP SERPL-CCNC: 67 U/L (ref 39–117)
ALT SERPL W P-5'-P-CCNC: 32 U/L (ref 1–33)
ANION GAP SERPL CALCULATED.3IONS-SCNC: 9 MMOL/L (ref 5–15)
AST SERPL-CCNC: 95 U/L (ref 1–32)
BASOPHILS # BLD AUTO: 0 10*3/MM3 (ref 0–0.2)
BASOPHILS NFR BLD AUTO: 0.6 % (ref 0–1.5)
BILIRUB SERPL-MCNC: 2.4 MG/DL (ref 0–1.2)
BUN SERPL-MCNC: 8 MG/DL (ref 6–20)
BUN/CREAT SERPL: 14 (ref 7–25)
CALCIUM SPEC-SCNC: 7.2 MG/DL (ref 8.6–10.5)
CHLORIDE SERPL-SCNC: 111 MMOL/L (ref 98–107)
CO2 SERPL-SCNC: 18 MMOL/L (ref 22–29)
CREAT SERPL-MCNC: 0.57 MG/DL (ref 0.57–1)
DEPRECATED RDW RBC AUTO: 72.6 FL (ref 37–54)
EOSINOPHIL # BLD AUTO: 0 10*3/MM3 (ref 0–0.4)
EOSINOPHIL NFR BLD AUTO: 0.6 % (ref 0.3–6.2)
ERYTHROCYTE [DISTWIDTH] IN BLOOD BY AUTOMATED COUNT: 20.2 % (ref 12.3–15.4)
GFR SERPL CREATININE-BSD FRML MDRD: 121 ML/MIN/1.73
GLOBULIN UR ELPH-MCNC: 2.3 GM/DL
GLUCOSE SERPL-MCNC: 107 MG/DL (ref 65–99)
HCT VFR BLD AUTO: 24.6 % (ref 34–46.6)
HGB BLD-MCNC: 7.3 G/DL (ref 12–15.9)
INR PPP: 1.75 (ref 0.93–1.1)
LYMPHOCYTES # BLD AUTO: 0.6 10*3/MM3 (ref 0.7–3.1)
LYMPHOCYTES NFR BLD AUTO: 13.3 % (ref 19.6–45.3)
MAGNESIUM SERPL-MCNC: 1.4 MG/DL (ref 1.6–2.6)
MCH RBC QN AUTO: 29.9 PG (ref 26.6–33)
MCHC RBC AUTO-ENTMCNC: 29.6 G/DL (ref 31.5–35.7)
MCV RBC AUTO: 101 FL (ref 79–97)
MONOCYTES # BLD AUTO: 0.1 10*3/MM3 (ref 0.1–0.9)
MONOCYTES NFR BLD AUTO: 3.4 % (ref 5–12)
NEUTROPHILS NFR BLD AUTO: 3.4 10*3/MM3 (ref 1.7–7)
NEUTROPHILS NFR BLD AUTO: 82.1 % (ref 42.7–76)
NRBC BLD AUTO-RTO: 0.2 /100 WBC (ref 0–0.2)
PLATELET # BLD AUTO: 79 10*3/MM3 (ref 140–450)
PMV BLD AUTO: 8.7 FL (ref 6–12)
POTASSIUM SERPL-SCNC: 3.6 MMOL/L (ref 3.5–5.2)
PROT SERPL-MCNC: 4.5 G/DL (ref 6–8.5)
PROTHROMBIN TIME: 18.6 SECONDS (ref 9.6–11.7)
RBC # BLD AUTO: 2.44 10*6/MM3 (ref 3.77–5.28)
SODIUM SERPL-SCNC: 138 MMOL/L (ref 136–145)
WBC # BLD AUTO: 4.2 10*3/MM3 (ref 3.4–10.8)

## 2021-10-05 PROCEDURE — 25010000002 CEFTRIAXONE PER 250 MG: Performed by: INTERNAL MEDICINE

## 2021-10-05 PROCEDURE — 99233 SBSQ HOSP IP/OBS HIGH 50: CPT | Performed by: NURSE PRACTITIONER

## 2021-10-05 PROCEDURE — 25010000003 PHYTONADIONE 10 MG/ML SOLUTION: Performed by: NURSE PRACTITIONER

## 2021-10-05 PROCEDURE — 80053 COMPREHEN METABOLIC PANEL: CPT | Performed by: NURSE PRACTITIONER

## 2021-10-05 PROCEDURE — 99232 SBSQ HOSP IP/OBS MODERATE 35: CPT | Performed by: INTERNAL MEDICINE

## 2021-10-05 PROCEDURE — 25010000002 MAGNESIUM SULFATE 2 GM/50ML SOLUTION: Performed by: INTERNAL MEDICINE

## 2021-10-05 PROCEDURE — 85025 COMPLETE CBC W/AUTO DIFF WBC: CPT | Performed by: NURSE PRACTITIONER

## 2021-10-05 PROCEDURE — 83735 ASSAY OF MAGNESIUM: CPT | Performed by: INTERNAL MEDICINE

## 2021-10-05 PROCEDURE — 25010000002 CALCIUM GLUCONATE-NACL 1-0.675 GM/50ML-% SOLUTION: Performed by: INTERNAL MEDICINE

## 2021-10-05 PROCEDURE — 94799 UNLISTED PULMONARY SVC/PX: CPT

## 2021-10-05 PROCEDURE — 85610 PROTHROMBIN TIME: CPT | Performed by: NURSE PRACTITIONER

## 2021-10-05 PROCEDURE — 25010000002 OCTREOTIDE PER 25 MCG: Performed by: INTERNAL MEDICINE

## 2021-10-05 RX ORDER — CALCIUM GLUCONATE 20 MG/ML
1 INJECTION, SOLUTION INTRAVENOUS ONCE
Status: COMPLETED | OUTPATIENT
Start: 2021-10-05 | End: 2021-10-05

## 2021-10-05 RX ORDER — PHYTONADIONE 10 MG/ML
5 INJECTION, EMULSION INTRAMUSCULAR; INTRAVENOUS; SUBCUTANEOUS ONCE
Status: COMPLETED | OUTPATIENT
Start: 2021-10-05 | End: 2021-10-05

## 2021-10-05 RX ADMIN — PANTOPRAZOLE SODIUM 40 MG: 40 INJECTION, POWDER, FOR SOLUTION INTRAVENOUS at 08:36

## 2021-10-05 RX ADMIN — PHYTONADIONE 5 MG: 10 INJECTION, EMULSION INTRAMUSCULAR; INTRAVENOUS; SUBCUTANEOUS at 13:37

## 2021-10-05 RX ADMIN — Medication 100 MG: at 08:36

## 2021-10-05 RX ADMIN — CALCIUM GLUCONATE 1 G: 20 INJECTION, SOLUTION INTRAVENOUS at 15:35

## 2021-10-05 RX ADMIN — FOLIC ACID 1 MG: 1 TABLET ORAL at 08:37

## 2021-10-05 RX ADMIN — DIPHENHYDRAMINE HYDROCHLORIDE AND LIDOCAINE HYDROCHLORIDE AND ALUMINUM HYDROXIDE AND MAGNESIUM HYDRO 10 ML: KIT at 18:45

## 2021-10-05 RX ADMIN — MAGNESIUM SULFATE HEPTAHYDRATE 2 G: 2 INJECTION, SOLUTION INTRAVENOUS at 08:37

## 2021-10-05 RX ADMIN — OCTREOTIDE ACETATE 25 MCG/HR: 500 INJECTION, SOLUTION INTRAVENOUS; SUBCUTANEOUS at 09:59

## 2021-10-05 RX ADMIN — SODIUM CHLORIDE 1000 ML: 9 INJECTION, SOLUTION INTRAVENOUS at 15:01

## 2021-10-05 RX ADMIN — MIDODRINE HYDROCHLORIDE 10 MG: 5 TABLET ORAL at 11:21

## 2021-10-05 RX ADMIN — CEFTRIAXONE SODIUM 1 G: 1 INJECTION, POWDER, FOR SOLUTION INTRAMUSCULAR; INTRAVENOUS at 03:14

## 2021-10-05 RX ADMIN — PANTOPRAZOLE SODIUM 40 MG: 40 INJECTION, POWDER, FOR SOLUTION INTRAVENOUS at 20:55

## 2021-10-05 RX ADMIN — MAGNESIUM SULFATE HEPTAHYDRATE 2 G: 2 INJECTION, SOLUTION INTRAVENOUS at 13:37

## 2021-10-05 RX ADMIN — MAGNESIUM SULFATE HEPTAHYDRATE 2 G: 2 INJECTION, SOLUTION INTRAVENOUS at 11:21

## 2021-10-05 RX ADMIN — Medication 220 MG: at 08:36

## 2021-10-05 RX ADMIN — MIDODRINE HYDROCHLORIDE 10 MG: 5 TABLET ORAL at 18:45

## 2021-10-05 RX ADMIN — SODIUM CHLORIDE 100 ML/HR: 9 INJECTION, SOLUTION INTRAVENOUS at 17:45

## 2021-10-05 RX ADMIN — MIDODRINE HYDROCHLORIDE 10 MG: 5 TABLET ORAL at 08:37

## 2021-10-05 RX ADMIN — DIPHENHYDRAMINE HYDROCHLORIDE AND LIDOCAINE HYDROCHLORIDE AND ALUMINUM HYDROXIDE AND MAGNESIUM HYDRO 10 ML: KIT at 09:27

## 2021-10-05 RX ADMIN — IPRATROPIUM BROMIDE AND ALBUTEROL SULFATE 3 ML: 2.5; .5 SOLUTION RESPIRATORY (INHALATION) at 08:10

## 2021-10-05 NOTE — NURSING NOTE
Pt resting in bed with no complaints throughout this shift. Pt with low bp but states this is her norm when sleeping.  Pt alert and oriented and receives midodrine TID.  Pt continues on octreotide drip and did have 1 episode of vomiting early in the shift which resolved after receiving iv zofran.

## 2021-10-05 NOTE — PROGRESS NOTES
[facility] Medicine Services Daily Progress Note    Patient Name: Alondra Eduardo  : 1985  MRN: [id number]  Primary Care Physician:  Irene Henriquez MD  Date of admission: 10/3/2021      Subjective      Chief Complaint:vomiting     Alondra Eduardo is a very pleasant 36 y.o. female who presented to [facility] on 10/3/2021 complaining of vomiting bright red blood this evening with clots.  She has a past medical history of her cirrhosis with previous episodes of esophageal variceal bleeding.  Last episode was in 2021.  She also has a past medical history of hepatitis C untreated.  Denies alcohol use.  Reports she only had one episode of vomiting yesterday .  She reports her stool was darker than usual but may have swallowed blood.  Globin today is 9.5 which is stable from previous.  Her enzymes are improved from last admission in August with ALT 25 AST 52 and bilirubin 2.1.  Reports some mild abdominal pain rated 1.  Potassium today is 3.2.  She was given one-time dose Protonix IV in ED and prophylactic 1 g ceftriaxone IV and gastroenterology has been consulted for EGD.  Has medical history also includes diastolic CHF and untreated hepatitis C.  She reports being partially COVID-19 vaccinated.  COVID-19 ordered and pending.    10/4/21 patient seen and examined now hypotensive.  Hemoglobin dropped.  Will transfuse 2 packed RBC, give a liter of IV normal saline.  discussed with RN. Iron 36 iv iron ordered  10/5/21 doing better today, hgl 7.3, no new complaints, bp still low, t max 100.7     Review of Systems   Constitutional: Positive for malaise/fatigue.   HENT: Negative.    Eyes: Negative.    Cardiovascular: Negative.    Respiratory: Negative.    Endocrine: Negative.    Hematologic/Lymphatic: Negative.    Skin: Negative.    Musculoskeletal: Negative.    Gastrointestinal: Negative.    Genitourinary: Negative.    Neurological: Negative.    Psychiatric/Behavioral:  Negative.    Allergic/Immunologic: Negative.    All other systems reviewed and are negative.     Objective      Vitals:   Temp:  [98.3 °F (36.8 °C)-100.7 °F (38.2 °C)] 98.4 °F (36.9 °C)  Heart Rate:  [58-95] 66  Resp:  [16-24] 18  BP: ()/(36-70) 69/36    Physical Exam  Vitals and nursing note reviewed.   Constitutional:       General: She is not in acute distress.     Appearance: She is well-developed. She is not ill-appearing, toxic-appearing or diaphoretic.   HENT:      Head: Normocephalic and atraumatic.      Nose: Nose normal. No congestion or rhinorrhea.      Mouth/Throat:      Mouth: Mucous membranes are moist.      Pharynx: No oropharyngeal exudate.   Eyes:      General: No scleral icterus.        Right eye: No discharge.         Left eye: No discharge.      Extraocular Movements: Extraocular movements intact.      Conjunctiva/sclera: Conjunctivae normal.      Pupils: Pupils are equal, round, and reactive to light.   Neck:      Thyroid: No thyromegaly.      Vascular: No carotid bruit or JVD.      Trachea: No tracheal deviation.   Cardiovascular:      Rate and Rhythm: Normal rate and regular rhythm.      Pulses: Normal pulses.      Heart sounds: Normal heart sounds. No murmur heard.   No friction rub. No gallop.    Pulmonary:      Effort: Pulmonary effort is normal. No respiratory distress.      Breath sounds: Normal breath sounds. No stridor.   Abdominal:      General: Bowel sounds are normal. There is no distension.      Palpations: Abdomen is soft. There is no mass.      Tenderness: There is no abdominal tenderness. There is no guarding or rebound.      Hernia: No hernia is present.   Musculoskeletal:         General: No swelling or tenderness. Normal range of motion.      Cervical back: Normal range of motion and neck supple. No rigidity. No muscular tenderness.   Lymphadenopathy:      Cervical: No cervical adenopathy.   Skin:     General: Skin is warm and dry.      Coloration: Skin is pale. Skin is  not jaundiced.      Findings: No bruising, erythema or rash.   Neurological:      General: No focal deficit present.      Mental Status: She is alert and oriented to person, place, and time. Mental status is at baseline.      Cranial Nerves: No cranial nerve deficit.      Motor: No abnormal muscle tone.   Psychiatric:         Mood and Affect: Mood normal.         Behavior: Behavior normal.         Thought Content: Thought content normal.         Judgment: Judgment normal.          Result Review    Result Review:  I have personally reviewed the results from the time of this admission to 10/5/2021 13:28 EDT and agree with these findings:  [x]  Laboratory  [x]  Microbiology  [x]  Radiology  [x]  EKG/Telemetry   []  Cardiology/Vascular   []  Pathology  []  Old records  []  Other:  Most notable findings include:hgl 7.1, inr 1.6  Assessment/Plan      Brief Patient Summary:  Alondra Eduardo is a 36 y.o. female who presented to [facility] on 10/3/2021 complaining of vomiting bright red blood this evening with clots.  She has a past medical history of her cirrhosis with previous episodes of esophageal variceal bleeding.  Last episode was in August 2021.  She also has a past medical history of hepatitis C untreated.  Denies alcohol use.  Reports she only had one episode of vomiting yesterday .  She reports her stool was darker than usual but may have swallowed blood.  Globin today is 9.5 which is stable from previous.  Her enzymes are improved from last admission in August with ALT 25 AST 52 and bilirubin 2.1.  Reports some mild abdominal pain rated 1.  Potassium today is 3.2    cefTRIAXone, 1 g, Intravenous, Q24H  folic acid, 1 mg, Oral, Daily  ipratropium-albuterol, 3 mL, Nebulization, 4x Daily - RT  midodrine, 10 mg, Oral, TID AC  nadolol, 20 mg, Oral, Daily  octreotide, 50 mcg, Intravenous, Once  pantoprazole, 40 mg, Intravenous, Q12H  phytonadione, 5 mg, Subcutaneous, Once  thiamine, 100 mg, Oral, Daily  zinc  sulfate, 220 mg, Oral, Daily       octreotide (SandoSTATIN) infusion, 25 mcg/hr, Last Rate: 25 mcg/hr (10/05/21 5956)  sodium chloride, 100 mL/hr, Last Rate: 100 mL/hr (10/03/21 4553)       Active Hospital Problems:  Active Hospital Problems    Diagnosis    • Gastrointestinal hemorrhage    • Acute upper GI bleed    • Hypokalemia    • Esophageal varices (HCC)      Acute upper GI bleed secondary to esophageal varices with past medical history of liver cirrhosis, hemoglobin and LFTs stable from previous,   -40 mg IV Protonix twice daily,   -normal saline IV fluid hydration,   -n.p.o. status except ice chips,   -monitor H&H every 8 hours, type and cross,   -gastroenterology consulted> reports that she has been tolerating clear liquids overnight without nausea/vomiting.  Does complain of some mild throat discomfort with swallowing.  States that previously this resolved with Spring's Magic mouthwash.  S/p EGD yesterday showing 3 cords of grade 3 esophageal varices with one cord with active bleeding s/p banding.  Hemoglobin 7.1 today from 8.5 yesterday.  Continue to monitor H/H.  Would not recommend transfusing PRBCs unless hemoglobin is less than 7.0 to avoid over transfusion which would make rebleeding much more likely.  Continue octreotide drip x48 more hours.  Continue IV PPI twice daily.  Continue Rocephin x4 more days.     Hypokalemia potassium 3.2, IV potassium replacement started magnesium pending     Liver cirrhosis, untreated hepatitis C, home meds unverified patient n.p.o. not reordered at this time     Diastolic CHF stable Home meds unverified at this time n.p.o. status not reordered meds at this time continuous cardiac monitoring     DVT prophylaxis:  Mechanical DVT prophylaxis orders are present.    CODE STATUS:    Code Status: CPR  Medical Interventions (Level of Support Prior to Arrest): Full      Disposition:  I expect patient to be discharged home.    This patient has been examined wearing appropriate  Personal Protective Equipment and discussed with rn. 10/05/21      Electronically signed by Momo Palafox MD, 10/05/21, 13:28 EDT.  [facility] Hospitalist Team

## 2021-10-05 NOTE — PROGRESS NOTES
LOS: 1 day   Patient Care Team:  Irene Henriquez MD as PCP - General (Internal Medicine)  Jean Hernandez MD as Consulting Physician (Nephrology)      Subjective     Interval History:     Subjective: Patient with no new complaints.  Reports a bowel movement overnight which was brown in color.  Denies any abdominal pain.  No nausea/vomiting.      ROS:   No chest pain, shortness of breath, or cough.        Medication Review:     Current Facility-Administered Medications:   •  acetaminophen (TYLENOL) tablet 650 mg, 650 mg, Oral, Q12H PRN, Ezekiel Medellin MD, 650 mg at 10/04/21 2134  •  cefTRIAXone (ROCEPHIN) 1 g in sodium chloride 0.9 % 100 mL IVPB, 1 g, Intravenous, Q24H, Tha Saleh MD, Last Rate: 200 mL/hr at 10/05/21 0314, 1 g at 10/05/21 0314  •  First Mouthwash (Magic Mouthwash) 10 mL, 10 mL, Swish & Spit, Q6H PRN, Jeanne Zarate, APRN, 10 mL at 10/05/21 0927  •  folic acid (FOLVITE) tablet 1 mg, 1 mg, Oral, Daily, Kanu Beasley, , 1 mg at 10/05/21 0837  •  ipratropium-albuterol (DUO-NEB) nebulizer solution 3 mL, 3 mL, Nebulization, 4x Daily - RT, Momo Palafox MD, 3 mL at 10/05/21 0810  •  Magnesium Sulfate 2 gram Bolus, followed by 8 gram infusion (total Mg dose 10 grams)- Mg less than or equal to 1mg/dL, 2 g, Intravenous, PRN **OR** Magnesium Sulfate 2 gram / 50mL Infusion (GIVE X 3 BAGS TO EQUAL 6GM TOTAL DOSE) - Mg 1.1 - 1.5 mg/dl, 2 g, Intravenous, PRN, Last Rate: 25 mL/hr at 10/05/21 1121, 2 g at 10/05/21 1121 **OR** Magnesium Sulfate 4 gram infusion- Mg 1.6-1.9 mg/dL, 4 g, Intravenous, PRN, Tha Saleh MD  •  midodrine (PROAMATINE) tablet 10 mg, 10 mg, Oral, TID AC, Kanu Beasley DO, 10 mg at 10/05/21 1121  •  nadolol (CORGARD) tablet 20 mg, 20 mg, Oral, Daily, Kanu Beasley DO, 20 mg at 10/04/21 0922  •  nitroglycerin (NITROSTAT) SL tablet 0.4 mg, 0.4 mg, Sublingual, Q5 Min PRN, Tha Saleh MD  •  octreotide (SANDOSTATIN) bolus from bag 5 mcg/mL 50 mcg, 50  mcg, Intravenous, Once **FOLLOWED BY** octreotide (sandoSTATIN) 500 mcg in sodium chloride 0.9 % 100 mL (5 mcg/mL) infusion, 25 mcg/hr, Intravenous, Continuous, Tha Saleh MD, Last Rate: 5 mL/hr at 10/05/21 0959, 25 mcg/hr at 10/05/21 0959  •  ondansetron (ZOFRAN) tablet 4 mg, 4 mg, Oral, Q6H PRN **OR** ondansetron (ZOFRAN) injection 4 mg, 4 mg, Intravenous, Q6H PRN, Tha Saleh MD, 4 mg at 10/04/21 2134  •  pantoprazole (PROTONIX) injection 40 mg, 40 mg, Intravenous, Q12H, Jeanne Zarate APRN, 40 mg at 10/05/21 0836  •  potassium chloride 10 mEq in 100 mL IVPB, 10 mEq, Intravenous, Q1H PRN, Tha Saleh MD, Last Rate: 100 mL/hr at 10/03/21 0610, 10 mEq at 10/03/21 0610  •  potassium chloride 10 mEq in 100 mL IVPB, 10 mEq, Intravenous, Q1H PRN, Tha Saleh MD  •  [COMPLETED] Insert peripheral IV, , , Once **AND** sodium chloride 0.9 % flush 10 mL, 10 mL, Intravenous, PRN, Tha Saleh MD, 10 mL at 10/04/21 0922  •  sodium chloride 0.9 % infusion, 100 mL/hr, Intravenous, Continuous, Tha Saleh MD, Last Rate: 100 mL/hr at 10/03/21 1715, 100 mL/hr at 10/03/21 1715  •  thiamine (VITAMIN B-1) tablet 100 mg, 100 mg, Oral, Daily, Tha Saleh MD, 100 mg at 10/05/21 0836  •  zinc sulfate (ZINCATE) capsule 220 mg, 220 mg, Oral, Daily, Tha Saleh MD, 220 mg at 10/05/21 0836      Objective     Vital Signs  Vitals:    10/05/21 0321 10/05/21 0637 10/05/21 0810 10/05/21 0817   BP: (!) 82/47 (!) 79/41     BP Location: Right arm Right arm     Patient Position: Lying Lying     Pulse: 73 95 70 70   Resp: 20 18 16 18   Temp: 99.3 °F (37.4 °C) 98.9 °F (37.2 °C)     TempSrc: Oral Oral     SpO2: 92% 93% 93% 94%   Weight:  71.4 kg (157 lb 8 oz)     Height:           Physical Exam:     General Appearance:    Awake and alert, in no acute distress   Head:    Normocephalic, without obvious abnormality   Eyes:          Conjunctivae normal, anicteric sclera   Throat:   No oral lesions, no thrush, oral  mucosa moist   Neck:   No adenopathy, supple, no JVD   Lungs:     respirations regular, even and unlabored   Abdomen:     Soft, non-tender, no rebound or guarding, non-distended   Rectal:     Deferred   Extremities:   No edema, no cyanosis   Skin:   No bruising or rash, no jaundice        Results Review:    CBC    Results from last 7 days   Lab Units 10/05/21  0403 10/04/21  1753 10/04/21  0610 10/03/21  2306 10/03/21  1658 10/03/21  1051 10/03/21  0414   WBC 10*3/mm3 4.20  --  3.40  --   --   --  4.60   HEMOGLOBIN g/dL 7.3* 8.6* 7.1* 8.5* 7.4* 8.0* 9.5*   PLATELETS 10*3/mm3 79*  --  75*  --   --   --  105*     CMP   Results from last 7 days   Lab Units 10/05/21  0403 10/04/21  0610 10/03/21  0426   SODIUM mmol/L 138 141 136   POTASSIUM mmol/L 3.6 3.6 3.2*   CHLORIDE mmol/L 111* 109* 103   CO2 mmol/L 18.0* 22.0 25.0   BUN mg/dL 8 9 7   CREATININE mg/dL 0.57 0.54* 0.54*   GLUCOSE mg/dL 107* 78 116*   ALBUMIN g/dL 2.20* 2.50* 2.70*   BILIRUBIN mg/dL 2.4* 2.0* 2.1*   ALK PHOS U/L 67 70 105   AST (SGOT) U/L 95* 51* 52*   ALT (SGPT) U/L 32 19 25   MAGNESIUM mg/dL 1.4*  --   --    LIPASE U/L  --   --  47     Cr Clearance Estimated Creatinine Clearance: 131.7 mL/min (by C-G formula based on SCr of 0.57 mg/dL).  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 Erosive esophagitis. The GE junction Chronic appearing 9 mm prepyloric antral ulcer without stigmata of bleeding.     PLAN:  Patient reports that she is feeling well.  No abdominal pain.  No nausea/vomiting.  Does report a bowel movement overnight which was normal brown in color.  S/p EGD yesterday showing 3 cords of grade 3 esophageal varices with one cord with active bleeding s/p banding.  Hemoglobin 7.3 today from 8.6.  No active GI bleeding reported.  Continue to monitor H/H and transfuse as needed.  Transfuse 1 unit PRBCs for hemoglobin less than 7.  Would not transfuse greater than 7.0 to avoid over transfusion which could make rebleeding much more likely.  Continue octreotide drip overnight.  Can be discontinued in the morning.  Continue IV PPI twice daily.  Continue Rocephin x3 more days.  Patient will need repeat EGD with banding in 4 weeks.  Advance to soft diet.  Plan a dose of vitamin K due to prolonged INR.  Patient can be discharged home in the morning if hemoglobin is stable.      Electronically signed by SEGUN Otero, 10/05/21, 11:25 AM EDT.

## 2021-10-05 NOTE — CASE MANAGEMENT/SOCIAL WORK
Discharge Planning Assessment   Pj     Patient Name: Alondra Eduardo  MRN: [id number]  Today's Date: 10/5/2021    Admit Date: 10/3/2021    Discharge Needs Assessment     Row Name 10/05/21 1107       Living Environment    Lives With  parent(s)    Current Living Arrangements  home/apartment/condo    Primary Care Provided by  self    Provides Primary Care For  no one    Family Caregiver if Needed  parent(s)    Quality of Family Relationships  helpful    Able to Return to Prior Arrangements  yes       Resource/Environmental Concerns    Resource/Environmental Concerns  none    Transportation Concerns  car, none       Transition Planning    Patient/Family Anticipates Transition to  home    Patient/Family Anticipated Services at Transition  none    Transportation Anticipated  car, drives self       Discharge Needs Assessment    Equipment Currently Used at Home  none    Concerns to be Addressed  no discharge needs identified;denies needs/concerns at this time    Anticipated Changes Related to Illness  none    Equipment Needed After Discharge  none    Provided Post Acute Provider List?  N/A    Provided Post Acute Provider Quality & Resource List?  N/A    Current Discharge Risk  substance use/abuse    Discharge Coordination/Progress  Spoke to patient at bedside. Denies dc needs. PCP and pharmacy confirmed        Discharge Plan     Row Name 10/05/21 1109       Plan    Plan  Home    Plan Comments  see assessment notes    Final Discharge Disposition Code  01 - home or self-care        Continued Care and Services - Admitted Since 10/3/2021    Coordination has not been started for this encounter.       Expected Discharge Date and Time     Expected Discharge Date Expected Discharge Time    Oct 5, 2021         Demographic Summary     Row Name 10/05/21 1100       General Information    Admission Type  observation    Arrived From  emergency department    Referral Source  admission list    Reason for Consult  discharge planning     Preferred Language  English        Functional Status     Row Name 10/05/21 1107       Functional Status    Usual Activity Tolerance  good    Current Activity Tolerance  good       Functional Status, IADL    Medications  independent    Meal Preparation  independent    Housekeeping  independent    Laundry  independent    Shopping  independent       Mental Status    General Appearance WDL  WDL       Mental Status Summary    Recent Changes in Mental Status/Cognitive Functioning  no changes       Employment/    Employment Status  employed full-time    Current or Previous Occupation  service industry        Met with patient in room wearing PPE: mask, face shield/goggles, gloves, gown.      Maintained distance greater than six feet and spent less than 15 minutes in the room.        Casie Richard RN

## 2021-10-06 VITALS
RESPIRATION RATE: 16 BRPM | TEMPERATURE: 97.6 F | OXYGEN SATURATION: 94 % | WEIGHT: 157.5 LBS | BODY MASS INDEX: 27.91 KG/M2 | HEART RATE: 64 BPM | DIASTOLIC BLOOD PRESSURE: 69 MMHG | SYSTOLIC BLOOD PRESSURE: 105 MMHG | HEIGHT: 63 IN

## 2021-10-06 LAB
ALBUMIN SERPL-MCNC: 2.5 G/DL (ref 3.5–5.2)
ALBUMIN/GLOB SERPL: 1 G/DL
ALP SERPL-CCNC: 84 U/L (ref 39–117)
ALT SERPL W P-5'-P-CCNC: 37 U/L (ref 1–33)
ANION GAP SERPL CALCULATED.3IONS-SCNC: 7 MMOL/L (ref 5–15)
AST SERPL-CCNC: 90 U/L (ref 1–32)
BASOPHILS # BLD AUTO: 0 10*3/MM3 (ref 0–0.2)
BASOPHILS NFR BLD AUTO: 0.8 % (ref 0–1.5)
BILIRUB SERPL-MCNC: 1.7 MG/DL (ref 0–1.2)
BUN SERPL-MCNC: 7 MG/DL (ref 6–20)
BUN/CREAT SERPL: 11.1 (ref 7–25)
CALCIUM SPEC-SCNC: 7.1 MG/DL (ref 8.6–10.5)
CHLORIDE SERPL-SCNC: 113 MMOL/L (ref 98–107)
CO2 SERPL-SCNC: 20 MMOL/L (ref 22–29)
CREAT SERPL-MCNC: 0.63 MG/DL (ref 0.57–1)
DEPRECATED RDW RBC AUTO: 66.9 FL (ref 37–54)
EOSINOPHIL # BLD AUTO: 0.1 10*3/MM3 (ref 0–0.4)
EOSINOPHIL NFR BLD AUTO: 2.4 % (ref 0.3–6.2)
ERYTHROCYTE [DISTWIDTH] IN BLOOD BY AUTOMATED COUNT: 19.9 % (ref 12.3–15.4)
GFR SERPL CREATININE-BSD FRML MDRD: 108 ML/MIN/1.73
GLOBULIN UR ELPH-MCNC: 2.5 GM/DL
GLUCOSE SERPL-MCNC: 106 MG/DL (ref 65–99)
HCT VFR BLD AUTO: 23.5 % (ref 34–46.6)
HGB BLD-MCNC: 7.5 G/DL (ref 12–15.9)
INR PPP: 1.51 (ref 0.93–1.1)
LYMPHOCYTES # BLD AUTO: 1.2 10*3/MM3 (ref 0.7–3.1)
LYMPHOCYTES NFR BLD AUTO: 34 % (ref 19.6–45.3)
MCH RBC QN AUTO: 30.3 PG (ref 26.6–33)
MCHC RBC AUTO-ENTMCNC: 31.9 G/DL (ref 31.5–35.7)
MCV RBC AUTO: 95 FL (ref 79–97)
MONOCYTES # BLD AUTO: 0.3 10*3/MM3 (ref 0.1–0.9)
MONOCYTES NFR BLD AUTO: 7.6 % (ref 5–12)
NEUTROPHILS NFR BLD AUTO: 1.9 10*3/MM3 (ref 1.7–7)
NEUTROPHILS NFR BLD AUTO: 55.2 % (ref 42.7–76)
NRBC BLD AUTO-RTO: 0.1 /100 WBC (ref 0–0.2)
PLATELET # BLD AUTO: 81 10*3/MM3 (ref 140–450)
PMV BLD AUTO: 8.8 FL (ref 6–12)
POTASSIUM SERPL-SCNC: 3.4 MMOL/L (ref 3.5–5.2)
POTASSIUM SERPL-SCNC: 3.8 MMOL/L (ref 3.5–5.2)
PROT SERPL-MCNC: 5 G/DL (ref 6–8.5)
PROTHROMBIN TIME: 16.3 SECONDS (ref 9.6–11.7)
RBC # BLD AUTO: 2.47 10*6/MM3 (ref 3.77–5.28)
SODIUM SERPL-SCNC: 140 MMOL/L (ref 136–145)
WBC # BLD AUTO: 3.4 10*3/MM3 (ref 3.4–10.8)

## 2021-10-06 PROCEDURE — 85610 PROTHROMBIN TIME: CPT | Performed by: NURSE PRACTITIONER

## 2021-10-06 PROCEDURE — 25010000002 CEFTRIAXONE PER 250 MG: Performed by: INTERNAL MEDICINE

## 2021-10-06 PROCEDURE — 99232 SBSQ HOSP IP/OBS MODERATE 35: CPT | Performed by: NURSE PRACTITIONER

## 2021-10-06 PROCEDURE — 99239 HOSP IP/OBS DSCHRG MGMT >30: CPT | Performed by: INTERNAL MEDICINE

## 2021-10-06 PROCEDURE — 25010000003 PHYTONADIONE 10 MG/ML SOLUTION 1 ML AMPULE: Performed by: INTERNAL MEDICINE

## 2021-10-06 PROCEDURE — 84132 ASSAY OF SERUM POTASSIUM: CPT | Performed by: INTERNAL MEDICINE

## 2021-10-06 PROCEDURE — 94799 UNLISTED PULMONARY SVC/PX: CPT

## 2021-10-06 PROCEDURE — 25010000002 CALCIUM GLUCONATE 2-0.675 GM/100ML-% SOLUTION: Performed by: INTERNAL MEDICINE

## 2021-10-06 PROCEDURE — 80053 COMPREHEN METABOLIC PANEL: CPT | Performed by: NURSE PRACTITIONER

## 2021-10-06 PROCEDURE — 85025 COMPLETE CBC W/AUTO DIFF WBC: CPT | Performed by: NURSE PRACTITIONER

## 2021-10-06 RX ORDER — ONDANSETRON 4 MG/1
4 TABLET, FILM COATED ORAL EVERY 6 HOURS PRN
Qty: 60 TABLET | Refills: 0 | Status: SHIPPED | OUTPATIENT
Start: 2021-10-06 | End: 2023-01-20

## 2021-10-06 RX ORDER — CALCIUM GLUCONATE 20 MG/ML
2 INJECTION, SOLUTION INTRAVENOUS ONCE
Status: COMPLETED | OUTPATIENT
Start: 2021-10-06 | End: 2021-10-06

## 2021-10-06 RX ORDER — PANTOPRAZOLE SODIUM 40 MG/1
40 TABLET, DELAYED RELEASE ORAL
Status: DISCONTINUED | OUTPATIENT
Start: 2021-10-06 | End: 2021-10-06 | Stop reason: HOSPADM

## 2021-10-06 RX ORDER — POTASSIUM CHLORIDE 20 MEQ/1
40 TABLET, EXTENDED RELEASE ORAL ONCE
Status: COMPLETED | OUTPATIENT
Start: 2021-10-06 | End: 2021-10-06

## 2021-10-06 RX ORDER — MIDODRINE HYDROCHLORIDE 10 MG/1
10 TABLET ORAL
Qty: 90 TABLET | Refills: 3 | Status: ON HOLD | OUTPATIENT
Start: 2021-10-06 | End: 2021-10-31 | Stop reason: SDUPTHER

## 2021-10-06 RX ORDER — LEVOFLOXACIN 750 MG/1
750 TABLET ORAL DAILY
Qty: 2 TABLET | Refills: 0 | Status: SHIPPED | OUTPATIENT
Start: 2021-10-06 | End: 2021-10-29

## 2021-10-06 RX ADMIN — MIDODRINE HYDROCHLORIDE 10 MG: 5 TABLET ORAL at 08:15

## 2021-10-06 RX ADMIN — Medication 10 ML: at 08:21

## 2021-10-06 RX ADMIN — MIDODRINE HYDROCHLORIDE 10 MG: 5 TABLET ORAL at 10:47

## 2021-10-06 RX ADMIN — CEFTRIAXONE SODIUM 1 G: 1 INJECTION, POWDER, FOR SOLUTION INTRAMUSCULAR; INTRAVENOUS at 04:47

## 2021-10-06 RX ADMIN — PANTOPRAZOLE SODIUM 40 MG: 40 INJECTION, POWDER, FOR SOLUTION INTRAVENOUS at 08:21

## 2021-10-06 RX ADMIN — POTASSIUM CHLORIDE 40 MEQ: 1500 TABLET, EXTENDED RELEASE ORAL at 08:21

## 2021-10-06 RX ADMIN — Medication 220 MG: at 08:15

## 2021-10-06 RX ADMIN — IPRATROPIUM BROMIDE AND ALBUTEROL SULFATE 3 ML: 2.5; .5 SOLUTION RESPIRATORY (INHALATION) at 11:33

## 2021-10-06 RX ADMIN — CALCIUM GLUCONATE 2 G: 20 INJECTION, SOLUTION INTRAVENOUS at 08:21

## 2021-10-06 RX ADMIN — PHYTONADIONE 10 MG: 10 INJECTION, EMULSION INTRAMUSCULAR; INTRAVENOUS; SUBCUTANEOUS at 10:48

## 2021-10-06 RX ADMIN — FOLIC ACID 1 MG: 1 TABLET ORAL at 08:15

## 2021-10-06 RX ADMIN — Medication 100 MG: at 08:15

## 2021-10-06 RX ADMIN — NADOLOL 20 MG: 20 TABLET ORAL at 08:15

## 2021-10-06 NOTE — PLAN OF CARE
Goal Outcome Evaluation:               Patient resting abed at this time, no distress noted. Patient continues to have low blood pressure but is also hopeful to be discharged soon

## 2021-10-06 NOTE — PROGRESS NOTES
LOS: 2 days   Patient Care Team:  Irene Henriquez MD as PCP - General (Internal Medicine)  Jean Hernandez MD as Consulting Physician (Nephrology)      Subjective     Interval History:     Subjective: Patient denies any new complaints.  She is not having any abdominal pain.  No nausea/vomiting.  Reports a bowel movement which was brown in color.  Wants to go home.      ROS:   No chest pain, shortness of breath, or cough.        Medication Review:     Current Facility-Administered Medications:   •  acetaminophen (TYLENOL) tablet 650 mg, 650 mg, Oral, Q12H PRN, Ezekiel Medellin MD, 650 mg at 10/04/21 2134  •  cefTRIAXone (ROCEPHIN) 1 g in sodium chloride 0.9 % 100 mL IVPB, 1 g, Intravenous, Q24H, Tha Saleh MD, Last Rate: 200 mL/hr at 10/06/21 0447, 1 g at 10/06/21 0447  •  First Mouthwash (Magic Mouthwash) 10 mL, 10 mL, Swish & Spit, Q6H PRN, Jeanne Zarate, APRN, 10 mL at 10/05/21 1845  •  folic acid (FOLVITE) tablet 1 mg, 1 mg, Oral, Daily, Kanu Beasley DO, 1 mg at 10/06/21 0815  •  ipratropium-albuterol (DUO-NEB) nebulizer solution 3 mL, 3 mL, Nebulization, 4x Daily - RT, Momo Palafox MD, 3 mL at 10/05/21 0810  •  Magnesium Sulfate 2 gram Bolus, followed by 8 gram infusion (total Mg dose 10 grams)- Mg less than or equal to 1mg/dL, 2 g, Intravenous, PRN **OR** Magnesium Sulfate 2 gram / 50mL Infusion (GIVE X 3 BAGS TO EQUAL 6GM TOTAL DOSE) - Mg 1.1 - 1.5 mg/dl, 2 g, Intravenous, PRN, Last Rate: 25 mL/hr at 10/05/21 1337, 2 g at 10/05/21 1337 **OR** Magnesium Sulfate 4 gram infusion- Mg 1.6-1.9 mg/dL, 4 g, Intravenous, PRN, Tha Saleh MD  •  midodrine (PROAMATINE) tablet 10 mg, 10 mg, Oral, TID AC, Kanu Beasley DO, 10 mg at 10/06/21 1047  •  nadolol (CORGARD) tablet 20 mg, 20 mg, Oral, Daily, Kanu Beasley DO, 20 mg at 10/06/21 0815  •  nitroglycerin (NITROSTAT) SL tablet 0.4 mg, 0.4 mg, Sublingual, Q5 Min PRN, Tha Saleh MD  •  ondansetron (ZOFRAN) tablet 4 mg, 4  mg, Oral, Q6H PRN **OR** ondansetron (ZOFRAN) injection 4 mg, 4 mg, Intravenous, Q6H PRN, Tha Saleh MD, 4 mg at 10/04/21 2134  •  pantoprazole (PROTONIX) EC tablet 40 mg, 40 mg, Oral, BID AC, Jeanne Zarate, APRN  •  phytonadione (AQUA-MEPHYTON, VITAMIN K) 10 mg in sodium chloride 0.9 % 50 mL IVPB, 10 mg, Intravenous, Once, Momo Palafox MD, Last Rate: 50 mL/hr at 10/06/21 1048, 10 mg at 10/06/21 1048  •  [COMPLETED] Insert peripheral IV, , , Once **AND** sodium chloride 0.9 % flush 10 mL, 10 mL, Intravenous, PRN, Tha Saleh MD, 10 mL at 10/06/21 0821  •  thiamine (VITAMIN B-1) tablet 100 mg, 100 mg, Oral, Daily, Tha Saleh MD, 100 mg at 10/06/21 0815  •  zinc sulfate (ZINCATE) capsule 220 mg, 220 mg, Oral, Daily, Tha Saleh MD, 220 mg at 10/06/21 0815      Objective     Vital Signs  Vitals:    10/05/21 1434 10/05/21 1900 10/06/21 0300 10/06/21 0700   BP: (!) 79/46 (!) 84/56 106/68 95/62   BP Location: Right arm Right arm Right arm Left arm   Patient Position: Lying Lying Lying Lying   Pulse: 62 75 77 72   Resp:  18 18 16   Temp:  98.7 °F (37.1 °C) 98.6 °F (37 °C) 98.8 °F (37.1 °C)   TempSrc:  Oral Oral Oral   SpO2: 92% 94% 98% 95%   Weight:       Height:           Physical Exam:     General Appearance:    Awake and alert, in no acute distress   Head:    Normocephalic, without obvious abnormality   Eyes:          Conjunctivae normal, anicteric sclera   Throat:   No oral lesions, no thrush, oral mucosa moist   Neck:   No adenopathy, supple, no JVD   Lungs:     respirations regular, even and unlabored   Abdomen:     Soft, non-tender, no rebound or guarding, non-distended   Rectal:     Deferred   Extremities:   No edema, no cyanosis   Skin:   No bruising or rash, no jaundice        Results Review:    CBC    Results from last 7 days   Lab Units 10/06/21  0330 10/05/21  0403 10/04/21  1753 10/04/21  0610 10/03/21  2306 10/03/21  1658 10/03/21  1051 10/03/21  0414 10/03/21  0414   WBC  10*3/mm3 3.40 4.20  --  3.40  --   --   --   --  4.60   HEMOGLOBIN g/dL 7.5* 7.3* 8.6* 7.1* 8.5* 7.4* 8.0*   < > 9.5*   PLATELETS 10*3/mm3 81* 79*  --  75*  --   --   --   --  105*    < > = values in this interval not displayed.     CMP   Results from last 7 days   Lab Units 10/06/21  0330 10/05/21  0403 10/04/21  0610 10/03/21  0426   SODIUM mmol/L 140 138 141 136   POTASSIUM mmol/L 3.4* 3.6 3.6 3.2*   CHLORIDE mmol/L 113* 111* 109* 103   CO2 mmol/L 20.0* 18.0* 22.0 25.0   BUN mg/dL 7 8 9 7   CREATININE mg/dL 0.63 0.57 0.54* 0.54*   GLUCOSE mg/dL 106* 107* 78 116*   ALBUMIN g/dL 2.50* 2.20* 2.50* 2.70*   BILIRUBIN mg/dL 1.7* 2.4* 2.0* 2.1*   ALK PHOS U/L 84 67 70 105   AST (SGOT) U/L 90* 95* 51* 52*   ALT (SGPT) U/L 37* 32 19 25   MAGNESIUM mg/dL  --  1.4*  --   --    LIPASE U/L  --   --   --  47     Cr Clearance Estimated Creatinine Clearance: 119.2 mL/min (by C-G formula based on SCr of 0.63 mg/dL).  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 Ochoa) at least 3 columns of grade 3 varices extending to the midesophagus status post banding ×4. Erosive esophagitis. The GE junction Chronic appearing 9 mm prepyloric antral ulcer without stigmata of bleeding.     PLAN:  Patient with no new complaints.  Continues to deny any  abdominal pain or nausea/vomiting.  Had another bowel movement which was brown in color.  S/p EGD on 10/4 which showed 3 cords of grade 3 esophageal varices with 1 cord with active bleeding s/p banding.  Hemoglobin 7.5 today from 7.3 yesterday.  Continue to monitor H/H.  We will discontinue octreotide drip.  Change IV PPI to oral dosing.  Would recommend continuing oral Levaquin x2 more days on discharge.   Patient will need repeat EGD with banding in 4 weeks.  Our office will contact the patient to schedule this.  Okay for discharge from GI standpoint.  We will see as inpatient as needed.      Electronically signed by SEGUN Otero, 10/06/21, 11:32 AM EDT.

## 2021-10-06 NOTE — DISCHARGE SUMMARY
[facility] Medicine Services  DISCHARGE SUMMARY    Patient Name: Alondra Eduardo  : 1985  MRN: [id number]    Date of Admission: 10/3/2021  Date of Discharge:  10/6/21  Primary Care Physician: Irene Henriquez MD      Presenting Problem:   Acute upper GI bleed [K92.2]  Gastrointestinal hemorrhage, unspecified gastrointestinal hemorrhage type [K92.2]    Active and Resolved Hospital Problems:  Active Hospital Problems    Diagnosis POA   • **Esophageal varices (HCC) [I85.00] Yes   • Gastrointestinal hemorrhage [K92.2] Yes   • Acute upper GI bleed [K92.2] Yes   • Hypokalemia [E87.6] Yes      Resolved Hospital Problems   No resolved problems to display.     Acute upper GI bleed secondary to esophageal varices with past medical history of liver cirrhosis, hemoglobin and LFTs stable from previous,   -40 mg IV Protonix twice daily,   -normal saline IV fluid hydration,   -n.p.o. status except ice chips,   -monitor H&H every 8 hours, type and cross,   -gastroenterology consulted> reports that she has been tolerating clear liquids overnight without nausea/vomiting.  Does complain of some mild throat discomfort with swallowing.  States that previously this resolved with [employer] mouthwash.  S/p EGD yesterday showing 3 cords of grade 3 esophageal varices with one cord with active bleeding s/p banding.  Hemoglobin 7.1 today from 8.5 yesterday.  Continue to monitor H/H.  Would not recommend transfusing PRBCs unless hemoglobin is less than 7.0 to avoid over transfusion which would make rebleeding much more likely.  Continue octreotide drip x48 more hours.  Continue IV PPI twice daily.  Continue Rocephin x4 more days.     Hypokalemia potassium 3.2, IV potassium replacement started magnesium pending     Liver cirrhosis, untreated hepatitis C, home meds unverified patient n.p.o. not reordered at this time     Diastolic CHF stable Home meds unverified at this time n.p.o. status not  reordered meds at this time continuous cardiac monitoring       Hospital Course     Hospital Course:  Alondra Eduardo is a 36 y.o. female pleasant 36 y.o. female who presented to [facility] on 10/3/2021 complaining of vomiting bright red blood this evening with clots.  She has a past medical history of her cirrhosis with previous episodes of esophageal variceal bleeding.  Last episode was in August 2021.  She also has a past medical history of hepatitis C untreated.  Denies alcohol use.  Reports she only had one episode of vomiting yesterday .  She reports her stool was darker than usual but may have swallowed blood.  Globin today is 9.5 which is stable from previous.  Her enzymes are improved from last admission in August with ALT 25 AST 52 and bilirubin 2.1.  Reports some mild abdominal pain rated 1.  Potassium today is 3.2.  She was given one-time dose Protonix IV in ED and prophylactic 1 g ceftriaxone IV and gastroenterology has been consulted for EGD.  Has medical history also includes diastolic CHF and untreated hepatitis C.  She reports being partially COVID-19 vaccinated.      10/4/21 patient seen and examined now hypotensive.  Hemoglobin dropped.  Will transfuse 2 packed RBC, give a liter of IV normal saline.  discussed with RN. Iron 36 iv iron ordered  10/5/21 doing better today, hgl 7.3, no new complaints, bp still low, t max 100.7   10/6/21 patient seen and examined in bed no acute distress, she is feeling better today, will discharge patient home.  On p.o. antibiotics.  Follow-up with PCP in a week.  Follow-up with GI next appointment.  Condition at discharge stable.  DISCHARGE Follow Up Recommendations for labs and diagnostics:  BMP CBC    Reasons For Change In Medications and Indications for New Medications:  Levaquin    Day of Discharge     Vital Signs:  Temp:  [97.6 °F (36.4 °C)-98.8 °F (37.1 °C)] 97.6 °F (36.4 °C)  Heart Rate:  [62-77] 64  Resp:  [16-18] 16  BP: ()/(46-69)  105/69      Physical Exam  Vitals and nursing note reviewed.   Constitutional:       General: She is not in acute distress.     Appearance: She is well-developed. She is not toxic-appearing.   HENT:      Head: Normocephalic and atraumatic.      Nose: Nose normal. No congestion or rhinorrhea.      Mouth/Throat:      Mouth: Mucous membranes are moist.      Pharynx: No oropharyngeal exudate.   Eyes:      General:         Right eye: No discharge.      Extraocular Movements: Extraocular movements intact.      Conjunctiva/sclera: Conjunctivae normal.      Pupils: Pupils are equal, round, and reactive to light.   Neck:      Thyroid: No thyromegaly.      Vascular: No JVD.      Trachea: No tracheal deviation.   Cardiovascular:      Rate and Rhythm: Normal rate and regular rhythm.      Pulses: Normal pulses.      Heart sounds: Normal heart sounds. No murmur heard.   No gallop.    Pulmonary:      Effort: Pulmonary effort is normal. No respiratory distress.      Breath sounds: Normal breath sounds.   Abdominal:      General: Bowel sounds are normal. There is no distension.      Palpations: Abdomen is soft.   Musculoskeletal:         General: No swelling or deformity. Normal range of motion.      Cervical back: Normal range of motion and neck supple. No rigidity. No muscular tenderness.   Skin:     General: Skin is warm and dry.      Coloration: Skin is pale. Skin is not jaundiced.      Findings: No bruising or erythema.   Neurological:      General: No focal deficit present.      Mental Status: She is alert and oriented to person, place, and time. Mental status is at baseline.      Cranial Nerves: No cranial nerve deficit.      Motor: No weakness or abnormal muscle tone.   Psychiatric:         Mood and Affect: Mood normal.         Behavior: Behavior normal.         Thought Content: Thought content normal.         Judgment: Judgment normal.        Pertinent  and/or Most Recent Results     LAB RESULTS:      Lab 10/06/21  6664  10/05/21  0403 10/04/21  1753 10/04/21  0610 10/03/21  2306 10/03/21  1051 10/03/21  0426 10/03/21  0414   0000   WBC 3.40 4.20  --  3.40  --   --   --  4.60  --    HEMOGLOBIN 7.5* 7.3* 8.6* 7.1* 8.5*   < >  --  9.5*  --    HEMATOCRIT 23.5* 24.6* 27.2* 22.3* 26.3*  --   --  28.9*   < >   PLATELETS 81* 79*  --  75*  --   --   --  105*  --    NEUTROS ABS 1.90 3.40  --  1.80  --   --   --  3.10  --    LYMPHS ABS 1.20 0.60*  --  1.40  --   --   --  1.00  --    MONOS ABS 0.30 0.10  --  0.20  --   --   --  0.40  --    EOS ABS 0.10 0.00  --  0.10  --   --   --  0.10  --    MCV 95.0 101.0*  --  91.8  --   --   --  90.2  --    PROTIME 16.3* 18.6*  --  17.3*  --   --  16.0*  --   --    APTT  --   --   --   --   --   --  34.6*  --   --     < > = values in this interval not displayed.         Lab 10/06/21  1227 10/06/21  0330 10/05/21  0403 10/04/21  0610 10/03/21  0426   SODIUM  --  140 138 141 136   POTASSIUM 3.8 3.4* 3.6 3.6 3.2*   CHLORIDE  --  113* 111* 109* 103   CO2  --  20.0* 18.0* 22.0 25.0   ANION GAP  --  7.0 9.0 10.0 8.0   BUN  --  7 8 9 7   CREATININE  --  0.63 0.57 0.54* 0.54*   GLUCOSE  --  106* 107* 78 116*   CALCIUM  --  7.1* 7.2* 7.5* 8.0*   MAGNESIUM  --   --  1.4*  --   --          Lab 10/06/21  0330 10/05/21  0403 10/04/21  0610 10/03/21  0426   TOTAL PROTEIN 5.0* 4.5* 5.0* 5.9*   ALBUMIN 2.50* 2.20* 2.50* 2.70*   GLOBULIN 2.5 2.3 2.5 3.2   ALT (SGPT) 37* 32 19 25   AST (SGOT) 90* 95* 51* 52*   BILIRUBIN 1.7* 2.4* 2.0* 2.1*   ALK PHOS 84 67 70 105   LIPASE  --   --   --  47         Lab 10/06/21  0330 10/05/21  0403 10/04/21  0610 10/03/21  0426   PROTIME 16.3* 18.6* 17.3* 16.0*   INR 1.51* 1.75* 1.61* 1.48*             Lab 10/03/21  0426   IRON 36*   IRON SATURATION 10*   TIBC 355   TRANSFERRIN 238   ABO TYPING O   RH TYPING Positive   ANTIBODY SCREEN Positive         Brief Urine Lab Results  (Last result in the past 365 days)      Color   Clarity   Blood   Leuk Est   Nitrite   Protein   CREAT   Urine HCG         08/10/21 1827               Negative         Microbiology Results (last 10 days)     Procedure Component Value - Date/Time    COVID PRE-OP / PRE-PROCEDURE SCREENING ORDER (NO ISOLATION) - Swab, Nasopharynx [[id number]]  (Normal) Collected: 10/03/21 0437    Lab Status: Final result Specimen: Swab from Nasopharynx Updated: 10/03/21 0532    Narrative:      The following orders were created for panel order COVID PRE-OP / PRE-PROCEDURE SCREENING ORDER (NO ISOLATION) - Swab, Nasopharynx.  Procedure                               Abnormality         Status                     ---------                               -----------         ------                     COVID-19,CEPHEID/JOSÉ MIGUEL/BD...[[id number]]  Normal              Final result                 Please view results for these tests on the individual orders.    COVID-19,CEPHEID/JOSÉ MIGUEL/BDMAX,COR/PATIENCE/PAD/MARSHALL IN-HOUSE(OR EMERGENT/ADD-ON),NP SWAB IN TRANSPORT MEDIA 3-4 HR TAT, RT-PCR - Swab, Nasopharynx [[id number]]  (Normal) Collected: 10/03/21 0437    Lab Status: Final result Specimen: Swab from Nasopharynx Updated: 10/03/21 0532     COVID19 Not Detected    Narrative:      Fact sheet for providers: https://www.fda.gov/media/161204/download     Fact sheet for patients: https://www.fda.gov/media/966730/download  Fact sheet for providers: https://www.fda.gov/media/581555/download     Fact sheet for patients: https://www.fda.gov/media/087315/download                       Results for orders placed during the hospital encounter of 08/09/21    Adult Transthoracic Echo Complete w/ Color, Spectral and Contrast if Necessary Per Protocol    Interpretation Summary  · Estimated left ventricular EF was in disagreement with the calculated left ventricular EF. Left ventricular ejection fraction appears to be greater than 70%. Left ventricular systolic function is normal.  · Left ventricular diastolic function was normal.  · Estimated right ventricular systolic pressure from tricuspid  regurgitation is normal (<35 mmHg).      Labs Pending at Discharge:      Procedures Performed  Procedure(s):  ESOPHAGOGASTRODUODENOSCOPY with esophageal variceal banding ligation         Consults:   Consults     Date and Time Order Name Status Description    10/3/2021  5:35 AM Gastroenterology Consult Completed     10/3/2021  5:14 AM Hospitalist (on-call MD unless specified) Completed         ASSESSMENT:  -Hematemesis -s/p EGD on 10/3/21  -Alcohol and hepatitis C cirrhosis (treatment naïve) with bleeding esophageal varices and ascites  -Elevated LFTs and INR due to above  -Normocytic anemia-chronic and related to hematemesis  -Thrombocytopenia rated to cirrhosis  -History of alcohol abuse, sober  -CHF  -Hypokalemia     8/10/2021 EGD (Dr. Ochoa) at least 3 columns of grade 3 varices extending to the midesophagus status post banding ×4. Erosive esophagitis. The GE junction Chronic appearing 9 mm prepyloric antral ulcer without stigmata of bleeding.     PLAN:  Patient with no new complaints.  Continues to deny any abdominal pain or nausea/vomiting.  Had another bowel movement which was brown in color.  S/p EGD on 10/4 which showed 3 cords of grade 3 esophageal varices with 1 cord with active bleeding s/p banding.  Hemoglobin 7.5 today from 7.3 yesterday.  Continue to monitor H/H.  We will discontinue octreotide drip.  Change IV PPI to oral dosing.  Would recommend continuing oral Levaquin x2 more days on discharge.   Patient will need repeat EGD with banding in 4 weeks.  Our office will contact the patient to schedule this.  Okay for discharge from GI standpoint.  We will see as inpatient as needed.        Electronically signed by SEGUN Otero, 10/06/21, 11:32 AM EDT.     Discharge Details        Discharge Medications      New Medications      Instructions Start Date   levoFLOXacin 750 MG tablet  Commonly known as: Levaquin   750 mg, Oral, Daily      ondansetron 4 MG tablet  Commonly known as: ZOFRAN   4 mg,  Oral, Every 6 Hours PRN         Changes to Medications      Instructions Start Date   midodrine 10 MG tablet  Commonly known as: PROAMATINE  What changed:   medication strength  when to take this  reasons to take this   10 mg, Oral, 3 Times Daily Before Meals         Continue These Medications      Instructions Start Date   folic acid 1 MG tablet  Commonly known as: FOLVITE   1 mg, Oral, Daily      nadolol 20 MG tablet  Commonly known as: CORGARD   20 mg, Oral, Daily      pantoprazole 40 MG EC tablet  Commonly known as: PROTONIX   40 mg, Oral, 2 Times Daily      thiamine 100 MG tablet tablet  Commonly known as: VITAMIN B-1   100 mg, Oral, Daily      zinc sulfate 220 (50 Zn) MG capsule  Commonly known as: ZINCATE   220 mg, Oral, Daily             No Known Allergies      Discharge Disposition:   Home or Self Care    Diet:  Hospital:  Diet Order   Procedures   • Diet Regular         Discharge Activity:   Activity Instructions     Gradually Increase Activity Until at Pre-Hospitalization Level              CODE STATUS:  Code Status and Medical Interventions:   Ordered at: 10/03/21 0535     Code Status:    CPR     Medical Interventions (Level of Support Prior to Arrest):    Full         No future appointments.    Additional Instructions for the Follow-ups that You Need to Schedule     Discharge Follow-up with PCP   As directed       Currently Documented PCP:    Irene Henriquez MD    PCP Phone Number:    [phone]     Follow Up Details: 1 week         Discharge Follow-up with Specified Provider: GI; 2 Weeks   As directed      To: GI    Follow Up: 2 Weeks               Time spent on Discharge including face to face service:  35 minutes    This patient has been examined wearing appropriate Personal Protective Equipment and discussed with RN. 10/06/21      Signature: Electronically signed by Momo Palafox MD, 10/06/21, 2:04 PM EDT.

## 2021-10-07 ENCOUNTER — READMISSION MANAGEMENT (OUTPATIENT)
Dept: CALL CENTER | Facility: HOSPITAL | Age: 36
End: 2021-10-07

## 2021-10-07 LAB
BH BB BLOOD EXPIRATION DATE: NORMAL
BH BB BLOOD EXPIRATION DATE: NORMAL
BH BB BLOOD TYPE BARCODE: 5100
BH BB BLOOD TYPE BARCODE: 5100
BH BB DISPENSE STATUS: NORMAL
BH BB DISPENSE STATUS: NORMAL
BH BB PRODUCT CODE: NORMAL
BH BB PRODUCT CODE: NORMAL
BH BB UNIT NUMBER: NORMAL
BH BB UNIT NUMBER: NORMAL
CROSSMATCH INTERPRETATION: NORMAL
CROSSMATCH INTERPRETATION: NORMAL
UNIT  ABO: NORMAL
UNIT  ABO: NORMAL
UNIT  RH: NORMAL
UNIT  RH: NORMAL

## 2021-10-07 NOTE — OUTREACH NOTE
Prep Survey      Responses   [facility] facility patient discharged from?  [facility]   Is LACE score < 7 ?  No   Emergency Room discharge w/ pulse ox?  No   Eligibility  Readm Mgmt   Discharge diagnosis  Esophageal varices   Does the patient have one of the following disease processes/diagnoses(primary or secondary)?  Other   Does the patient have Home health ordered?  No   Is there a DME ordered?  No   Prep survey completed?  Yes          CHRISTIANE De Los Santos RN

## 2021-10-08 NOTE — PAYOR COMM NOTE
"This is discharge notice for Margaux Eduardo  Reference/Auth # [id number]  Pt discharged routine to home on 10/6/21.    Cesilia Portillo RN, BSN  Utilization Review Nurse  [facility]  Direct & confidential phone # [phone]  Fax # [phone]      Margaux Eduardo (36 y.o. Female)     Date of Birth Social Security Number Address Home Phone MRN    1985  [id number]  [address]  [place] [address] [phone] [id number]    Rastafarian Marital Status          None Single       Admission Date Admission Type Admitting Provider Attending Provider Department, Room/Bed    10/3/21 Emergency Momo Palafox MD  [facility] 3A MEDICAL INPATIENT, 311/    Discharge Date Discharge Disposition Discharge Destination        10/6/2021 Home or Self Care              Attending Provider: (none)   Allergies: No Known Allergies    Isolation: None   Infection: None   Code Status: Prior    Ht: 160 cm (63\")   Wt: 71.4 kg (157 lb 8 oz)    Admission Cmt: None   Principal Problem: Esophageal varices (HCC) [I85.00]                 Active Insurance as of 10/3/2021     Primary Coverage     Payor Plan Insurance Group Employer/Plan Group    [facility] [id number]      Payor Plan Address Payor Plan Phone Number Payor Plan Fax Number Effective Dates    [address]   2021 - None Entered    [facility] [id number]       Subscriber Name Subscriber Birth Date Member ID       MARGAUX EDUARDO 1985 [id number]                 Emergency Contacts      (Rel.) Home Phone Work Phone Mobile Phone    Ivan Sheikh (Father) -- -- --               Discharge Summary      Momo Palafox MD at 10/06/21 1400                       [facility]  DISCHARGE SUMMARY    Patient Name: Margaux Eduardo  : 1985  MRN: [id number]    Date of Admission: 10/3/2021  Date of Discharge:  10/6/21  Primary Care Physician: Irene Henriquez, " MD      Presenting Problem:   Acute upper GI bleed [K92.2]  Gastrointestinal hemorrhage, unspecified gastrointestinal hemorrhage type [K92.2]    Active and Resolved Hospital Problems:  Active Hospital Problems    Diagnosis POA   • **Esophageal varices (HCC) [I85.00] Yes   • Gastrointestinal hemorrhage [K92.2] Yes   • Acute upper GI bleed [K92.2] Yes   • Hypokalemia [E87.6] Yes      Resolved Hospital Problems   No resolved problems to display.     Acute upper GI bleed secondary to esophageal varices with past medical history of liver cirrhosis, hemoglobin and LFTs stable from previous,   -40 mg IV Protonix twice daily,   -normal saline IV fluid hydration,   -n.p.o. status except ice chips,   -monitor H&H every 8 hours, type and cross,   -gastroenterology consulted> reports that she has been tolerating clear liquids overnight without nausea/vomiting.  Does complain of some mild throat discomfort with swallowing.  States that previously this resolved with Spring's Magic mouthwash.  S/p EGD yesterday showing 3 cords of grade 3 esophageal varices with one cord with active bleeding s/p banding.  Hemoglobin 7.1 today from 8.5 yesterday.  Continue to monitor H/H.  Would not recommend transfusing PRBCs unless hemoglobin is less than 7.0 to avoid over transfusion which would make rebleeding much more likely.  Continue octreotide drip x48 more hours.  Continue IV PPI twice daily.  Continue Rocephin x4 more days.     Hypokalemia potassium 3.2, IV potassium replacement started magnesium pending     Liver cirrhosis, untreated hepatitis C, home meds unverified patient n.p.o. not reordered at this time     Diastolic CHF stable Home meds unverified at this time n.p.o. status not reordered meds at this time continuous cardiac monitoring       Hospital Course     Hospital Course:  Alondra Eduardo is a 36 y.o. female pleasant 36 y.o. female who presented to [facility] on 10/3/2021 complaining of vomiting bright red blood this  evening with clots.  She has a past medical history of her cirrhosis with previous episodes of esophageal variceal bleeding.  Last episode was in August 2021.  She also has a past medical history of hepatitis C untreated.  Denies alcohol use.  Reports she only had one episode of vomiting yesterday .  She reports her stool was darker than usual but may have swallowed blood.  Globin today is 9.5 which is stable from previous.  Her enzymes are improved from last admission in August with ALT 25 AST 52 and bilirubin 2.1.  Reports some mild abdominal pain rated 1.  Potassium today is 3.2.  She was given one-time dose Protonix IV in ED and prophylactic 1 g ceftriaxone IV and gastroenterology has been consulted for EGD.  Has medical history also includes diastolic CHF and untreated hepatitis C.  She reports being partially COVID-19 vaccinated.      10/4/21 patient seen and examined now hypotensive.  Hemoglobin dropped.  Will transfuse 2 packed RBC, give a liter of IV normal saline.  discussed with RN. Iron 36 iv iron ordered  10/5/21 doing better today, hgl 7.3, no new complaints, bp still low, t max 100.7   10/6/21 patient seen and examined in bed no acute distress, she is feeling better today, will discharge patient home.  On p.o. antibiotics.  Follow-up with PCP in a week.  Follow-up with GI next appointment.  Condition at discharge stable.  DISCHARGE Follow Up Recommendations for labs and diagnostics:  BMP CBC    Reasons For Change In Medications and Indications for New Medications:  Levaquin    Day of Discharge     Vital Signs:  Temp:  [97.6 °F (36.4 °C)-98.8 °F (37.1 °C)] 97.6 °F (36.4 °C)  Heart Rate:  [62-77] 64  Resp:  [16-18] 16  BP: ()/(46-69) 105/69      Physical Exam  Vitals and nursing note reviewed.   Constitutional:       General: She is not in acute distress.     Appearance: She is well-developed. She is not toxic-appearing.   HENT:      Head: Normocephalic and atraumatic.      Nose: Nose normal. No  congestion or rhinorrhea.      Mouth/Throat:      Mouth: Mucous membranes are moist.      Pharynx: No oropharyngeal exudate.   Eyes:      General:         Right eye: No discharge.      Extraocular Movements: Extraocular movements intact.      Conjunctiva/sclera: Conjunctivae normal.      Pupils: Pupils are equal, round, and reactive to light.   Neck:      Thyroid: No thyromegaly.      Vascular: No JVD.      Trachea: No tracheal deviation.   Cardiovascular:      Rate and Rhythm: Normal rate and regular rhythm.      Pulses: Normal pulses.      Heart sounds: Normal heart sounds. No murmur heard.   No gallop.    Pulmonary:      Effort: Pulmonary effort is normal. No respiratory distress.      Breath sounds: Normal breath sounds.   Abdominal:      General: Bowel sounds are normal. There is no distension.      Palpations: Abdomen is soft.   Musculoskeletal:         General: No swelling or deformity. Normal range of motion.      Cervical back: Normal range of motion and neck supple. No rigidity. No muscular tenderness.   Skin:     General: Skin is warm and dry.      Coloration: Skin is pale. Skin is not jaundiced.      Findings: No bruising or erythema.   Neurological:      General: No focal deficit present.      Mental Status: She is alert and oriented to person, place, and time. Mental status is at baseline.      Cranial Nerves: No cranial nerve deficit.      Motor: No weakness or abnormal muscle tone.   Psychiatric:         Mood and Affect: Mood normal.         Behavior: Behavior normal.         Thought Content: Thought content normal.         Judgment: Judgment normal.        Pertinent  and/or Most Recent Results     LAB RESULTS:      Lab 10/06/21  0330 10/05/21  0403 10/04/21  1753 10/04/21  0610 10/03/21  2306 10/03/21  1051 10/03/21  0426 10/03/21  0414   0000   WBC 3.40 4.20  --  3.40  --   --   --  4.60  --    HEMOGLOBIN 7.5* 7.3* 8.6* 7.1* 8.5*   < >  --  9.5*  --    HEMATOCRIT 23.5* 24.6* 27.2* 22.3* 26.3*  --    --  28.9*   < >   PLATELETS 81* 79*  --  75*  --   --   --  105*  --    NEUTROS ABS 1.90 3.40  --  1.80  --   --   --  3.10  --    LYMPHS ABS 1.20 0.60*  --  1.40  --   --   --  1.00  --    MONOS ABS 0.30 0.10  --  0.20  --   --   --  0.40  --    EOS ABS 0.10 0.00  --  0.10  --   --   --  0.10  --    MCV 95.0 101.0*  --  91.8  --   --   --  90.2  --    PROTIME 16.3* 18.6*  --  17.3*  --   --  16.0*  --   --    APTT  --   --   --   --   --   --  34.6*  --   --     < > = values in this interval not displayed.         Lab 10/06/21  1227 10/06/21  0330 10/05/21  0403 10/04/21  0610 10/03/21  0426   SODIUM  --  140 138 141 136   POTASSIUM 3.8 3.4* 3.6 3.6 3.2*   CHLORIDE  --  113* 111* 109* 103   CO2  --  20.0* 18.0* 22.0 25.0   ANION GAP  --  7.0 9.0 10.0 8.0   BUN  --  7 8 9 7   CREATININE  --  0.63 0.57 0.54* 0.54*   GLUCOSE  --  106* 107* 78 116*   CALCIUM  --  7.1* 7.2* 7.5* 8.0*   MAGNESIUM  --   --  1.4*  --   --          Lab 10/06/21  0330 10/05/21  0403 10/04/21  0610 10/03/21  0426   TOTAL PROTEIN 5.0* 4.5* 5.0* 5.9*   ALBUMIN 2.50* 2.20* 2.50* 2.70*   GLOBULIN 2.5 2.3 2.5 3.2   ALT (SGPT) 37* 32 19 25   AST (SGOT) 90* 95* 51* 52*   BILIRUBIN 1.7* 2.4* 2.0* 2.1*   ALK PHOS 84 67 70 105   LIPASE  --   --   --  47         Lab 10/06/21  0330 10/05/21  0403 10/04/21  0610 10/03/21  0426   PROTIME 16.3* 18.6* 17.3* 16.0*   INR 1.51* 1.75* 1.61* 1.48*             Lab 10/03/21  0426   IRON 36*   IRON SATURATION 10*   TIBC 355   TRANSFERRIN 238   ABO TYPING O   RH TYPING Positive   ANTIBODY SCREEN Positive         Brief Urine Lab Results  (Last result in the past 365 days)      Color   Clarity   Blood   Leuk Est   Nitrite   Protein   CREAT   Urine HCG        08/10/21 1827               Negative         Microbiology Results (last 10 days)     Procedure Component Value - Date/Time    COVID PRE-OP / PRE-PROCEDURE SCREENING ORDER (NO ISOLATION) - Swab, Nasopharynx [[id number]]  (Normal) Collected: 10/03/21 0437    Lab  Status: Final result Specimen: Swab from Nasopharynx Updated: 10/03/21 0532    Narrative:      The following orders were created for panel order COVID PRE-OP / PRE-PROCEDURE SCREENING ORDER (NO ISOLATION) - Swab, Nasopharynx.  Procedure                               Abnormality         Status                     ---------                               -----------         ------                     COVID-19,CEPHEID/JOSÉ MIGUEL/BD...[[id number]]  Normal              Final result                 Please view results for these tests on the individual orders.    COVID-19,CEPHEID/JOSÉ MIGUEL/BDMAX,COR/PATIENCE/PAD/MARSHALL IN-HOUSE(OR EMERGENT/ADD-ON),NP SWAB IN TRANSPORT MEDIA 3-4 HR TAT, RT-PCR - Swab, Nasopharynx [[id number]]  (Normal) Collected: 10/03/21 0437    Lab Status: Final result Specimen: Swab from Nasopharynx Updated: 10/03/21 0532     COVID19 Not Detected    Narrative:      Fact sheet for providers: https://www.fda.gov/media/431465/download     Fact sheet for patients: https://www.fda.gov/media/707679/download  Fact sheet for providers: https://www.fda.gov/media/669386/download     Fact sheet for patients: https://www.fda.gov/media/896383/download                       Results for orders placed during the hospital encounter of 08/09/21    Adult Transthoracic Echo Complete w/ Color, Spectral and Contrast if Necessary Per Protocol    Interpretation Summary  · Estimated left ventricular EF was in disagreement with the calculated left ventricular EF. Left ventricular ejection fraction appears to be greater than 70%. Left ventricular systolic function is normal.  · Left ventricular diastolic function was normal.  · Estimated right ventricular systolic pressure from tricuspid regurgitation is normal (<35 mmHg).      Labs Pending at Discharge:      Procedures Performed  Procedure(s):  ESOPHAGOGASTRODUODENOSCOPY with esophageal variceal banding ligation         Consults:   Consults     Date and Time Order Name Status Description    10/3/2021   5:35 AM Gastroenterology Consult Completed     10/3/2021  5:14 AM Hospitalist (on-call MD unless specified) Completed         ASSESSMENT:  -Hematemesis -s/p EGD on 10/3/21  -Alcohol and hepatitis C cirrhosis (treatment naïve) with bleeding esophageal varices and ascites  -Elevated LFTs and INR due to above  -Normocytic anemia-chronic and related to hematemesis  -Thrombocytopenia rated to cirrhosis  -History of alcohol abuse, sober  -CHF  -Hypokalemia     8/10/2021 EGD (Dr. Ochoa) at least 3 columns of grade 3 varices extending to the midesophagus status post banding ×4. Erosive esophagitis. The GE junction Chronic appearing 9 mm prepyloric antral ulcer without stigmata of bleeding.     PLAN:  Patient with no new complaints.  Continues to deny any abdominal pain or nausea/vomiting.  Had another bowel movement which was brown in color.  S/p EGD on 10/4 which showed 3 cords of grade 3 esophageal varices with 1 cord with active bleeding s/p banding.  Hemoglobin 7.5 today from 7.3 yesterday.  Continue to monitor H/H.  We will discontinue octreotide drip.  Change IV PPI to oral dosing.  Would recommend continuing oral Levaquin x2 more days on discharge.   Patient will need repeat EGD with banding in 4 weeks.  Our office will contact the patient to schedule this.  Okay for discharge from GI standpoint.  We will see as inpatient as needed.        Electronically signed by SEGUN Otero, 10/06/21, 11:32 AM EDT.     Discharge Details        Discharge Medications      New Medications      Instructions Start Date   levoFLOXacin 750 MG tablet  Commonly known as: Levaquin   750 mg, Oral, Daily      ondansetron 4 MG tablet  Commonly known as: ZOFRAN   4 mg, Oral, Every 6 Hours PRN         Changes to Medications      Instructions Start Date   midodrine 10 MG tablet  Commonly known as: PROAMATINE  What changed:   · medication strength  · when to take this  · reasons to take this   10 mg, Oral, 3 Times Daily Before Meals          Continue These Medications      Instructions Start Date   folic acid 1 MG tablet  Commonly known as: FOLVITE   1 mg, Oral, Daily      nadolol 20 MG tablet  Commonly known as: CORGARD   20 mg, Oral, Daily      pantoprazole 40 MG EC tablet  Commonly known as: PROTONIX   40 mg, Oral, 2 Times Daily      thiamine 100 MG tablet tablet  Commonly known as: VITAMIN B-1   100 mg, Oral, Daily      zinc sulfate 220 (50 Zn) MG capsule  Commonly known as: ZINCATE   220 mg, Oral, Daily             No Known Allergies      Discharge Disposition:   Home or Self Care    Diet:  Hospital:  Diet Order   Procedures   • Diet Regular         Discharge Activity:   Activity Instructions     Gradually Increase Activity Until at Pre-Hospitalization Level              CODE STATUS:  Code Status and Medical Interventions:   Ordered at: 10/03/21 0535     Code Status:    CPR     Medical Interventions (Level of Support Prior to Arrest):    Full         No future appointments.    Additional Instructions for the Follow-ups that You Need to Schedule     Discharge Follow-up with PCP   As directed       Currently Documented PCP:    Irene Henriquez MD    PCP Phone Number:    [phone]     Follow Up Details: 1 week         Discharge Follow-up with Specified Provider: GI; 2 Weeks   As directed      To: GI    Follow Up: 2 Weeks               Time spent on Discharge including face to face service:  35 minutes    This patient has been examined wearing appropriate Personal Protective Equipment and discussed with RN. 10/06/21      Signature: Electronically signed by Momo Palafox MD, 10/06/21, 2:04 PM EDT.      Electronically signed by Momo Palafox MD at 10/06/21 0165

## 2021-10-11 ENCOUNTER — READMISSION MANAGEMENT (OUTPATIENT)
Dept: CALL CENTER | Facility: HOSPITAL | Age: 36
End: 2021-10-11

## 2021-10-11 NOTE — OUTREACH NOTE
Medical Week 1 Survey      Responses   [facility] patient discharged from? [facility]   Does the patient have one of the following disease processes/diagnoses(primary or secondary)? Other   Week 1 attempt successful? No   Unsuccessful attempts Attempt 1          Alisa Diaz LPN

## 2021-10-20 ENCOUNTER — READMISSION MANAGEMENT (OUTPATIENT)
Dept: CALL CENTER | Facility: HOSPITAL | Age: 36
End: 2021-10-20

## 2021-10-20 NOTE — OUTREACH NOTE
Medical Week 2 Survey      Responses   [facility] [place] patient discharged from? [facility]   Does the patient have one of the following disease processes/diagnoses(primary or secondary)? Other   Week 2 attempt successful? No   Unsuccessful attempts Attempt 1          Chelita Tang LPN

## 2021-10-22 ENCOUNTER — READMISSION MANAGEMENT (OUTPATIENT)
Dept: CALL CENTER | Facility: HOSPITAL | Age: 36
End: 2021-10-22

## 2021-10-22 NOTE — OUTREACH NOTE
Medical Week 2 Survey      Responses   [facility] patient discharged from? [facility]   Does the patient have one of the following disease processes/diagnoses(primary or secondary)? Other   Week 2 attempt successful? No   Unsuccessful attempts Attempt 2          Chelita Tang LPN

## 2021-10-28 ENCOUNTER — HOSPITAL ENCOUNTER (INPATIENT)
Facility: HOSPITAL | Age: 36
LOS: 2 days | Discharge: HOME | End: 2021-10-31
Attending: HOSPITALIST | Admitting: HOSPITALIST

## 2021-10-28 ENCOUNTER — APPOINTMENT (OUTPATIENT)
Dept: GENERAL RADIOLOGY | Facility: HOSPITAL | Age: 36
End: 2021-10-28

## 2021-10-28 DIAGNOSIS — I85.01 BLEEDING ESOPHAGEAL VARICES, UNSPECIFIED ESOPHAGEAL VARICES TYPE (HCC): ICD-10-CM

## 2021-10-28 DIAGNOSIS — K92.2 GASTROINTESTINAL HEMORRHAGE, UNSPECIFIED GASTROINTESTINAL HEMORRHAGE TYPE: ICD-10-CM

## 2021-10-28 DIAGNOSIS — D64.9 ANEMIA, UNSPECIFIED TYPE: ICD-10-CM

## 2021-10-28 DIAGNOSIS — E87.6 HYPOKALEMIA: ICD-10-CM

## 2021-10-28 DIAGNOSIS — K92.0 HEMATEMESIS WITH NAUSEA: Primary | ICD-10-CM

## 2021-10-28 LAB
ABO GROUP BLD: NORMAL
ALBUMIN SERPL-MCNC: 3 G/DL (ref 3.5–5.2)
ALBUMIN/GLOB SERPL: 1.3 G/DL
ALP SERPL-CCNC: 75 U/L (ref 39–117)
ALT SERPL W P-5'-P-CCNC: 19 U/L (ref 1–33)
AMMONIA BLD-SCNC: 15 UMOL/L (ref 11–51)
ANION GAP SERPL CALCULATED.3IONS-SCNC: 11 MMOL/L (ref 5–15)
ANTI-E: NORMAL
AST SERPL-CCNC: 44 U/L (ref 1–32)
BASOPHILS # BLD AUTO: 0 10*3/MM3 (ref 0–0.2)
BASOPHILS NFR BLD AUTO: 0.9 % (ref 0–1.5)
BILIRUB SERPL-MCNC: 1.6 MG/DL (ref 0–1.2)
BLD GP AB SCN SERPL QL: POSITIVE
BUN SERPL-MCNC: 11 MG/DL (ref 6–20)
BUN/CREAT SERPL: 22 (ref 7–25)
CALCIUM SPEC-SCNC: 8 MG/DL (ref 8.6–10.5)
CHLORIDE SERPL-SCNC: 106 MMOL/L (ref 98–107)
CO2 SERPL-SCNC: 21 MMOL/L (ref 22–29)
CREAT SERPL-MCNC: 0.5 MG/DL (ref 0.57–1)
D-LACTATE SERPL-SCNC: 1.4 MMOL/L (ref 0.5–2)
DEPRECATED RDW RBC AUTO: 65.6 FL (ref 37–54)
EOSINOPHIL # BLD AUTO: 0 10*3/MM3 (ref 0–0.4)
EOSINOPHIL NFR BLD AUTO: 1 % (ref 0.3–6.2)
ERYTHROCYTE [DISTWIDTH] IN BLOOD BY AUTOMATED COUNT: 19.9 % (ref 12.3–15.4)
FERRITIN SERPL-MCNC: 43.43 NG/ML (ref 13–150)
GFR SERPL CREATININE-BSD FRML MDRD: 141 ML/MIN/1.73
GLOBULIN UR ELPH-MCNC: 2.3 GM/DL
GLUCOSE SERPL-MCNC: 103 MG/DL (ref 65–99)
HCG SERPL QL: NEGATIVE
HCT VFR BLD AUTO: 21.7 % (ref 34–46.6)
HGB BLD-MCNC: 7 G/DL (ref 12–15.9)
IRON 24H UR-MRATE: 31 MCG/DL (ref 37–145)
IRON SATN MFR SERPL: 15 % (ref 20–50)
LIPASE SERPL-CCNC: 36 U/L (ref 13–60)
LYMPHOCYTES # BLD AUTO: 1 10*3/MM3 (ref 0.7–3.1)
LYMPHOCYTES NFR BLD AUTO: 24.2 % (ref 19.6–45.3)
MCH RBC QN AUTO: 30 PG (ref 26.6–33)
MCHC RBC AUTO-ENTMCNC: 32.3 G/DL (ref 31.5–35.7)
MCV RBC AUTO: 93.1 FL (ref 79–97)
MONOCYTES # BLD AUTO: 0.2 10*3/MM3 (ref 0.1–0.9)
MONOCYTES NFR BLD AUTO: 4.8 % (ref 5–12)
NEUTROPHILS NFR BLD AUTO: 2.7 10*3/MM3 (ref 1.7–7)
NEUTROPHILS NFR BLD AUTO: 69.1 % (ref 42.7–76)
NRBC BLD AUTO-RTO: 0 /100 WBC (ref 0–0.2)
PLATELET # BLD AUTO: 83 10*3/MM3 (ref 140–450)
PMV BLD AUTO: 7.9 FL (ref 6–12)
POTASSIUM SERPL-SCNC: 3.3 MMOL/L (ref 3.5–5.2)
PROT SERPL-MCNC: 5.3 G/DL (ref 6–8.5)
RBC # BLD AUTO: 2.33 10*6/MM3 (ref 3.77–5.28)
RETICS # AUTO: 0.06 10*6/MM3 (ref 0.02–0.13)
RETICS/RBC NFR AUTO: 2.45 % (ref 0.7–1.9)
RH BLD: POSITIVE
SODIUM SERPL-SCNC: 138 MMOL/L (ref 136–145)
T&S EXPIRATION DATE: NORMAL
TIBC SERPL-MCNC: 212 MCG/DL (ref 298–536)
TRANSFERRIN SERPL-MCNC: 142 MG/DL (ref 200–360)
WBC # BLD AUTO: 3.9 10*3/MM3 (ref 3.4–10.8)

## 2021-10-28 PROCEDURE — 84466 ASSAY OF TRANSFERRIN: CPT | Performed by: NURSE PRACTITIONER

## 2021-10-28 PROCEDURE — 85045 AUTOMATED RETICULOCYTE COUNT: CPT | Performed by: NURSE PRACTITIONER

## 2021-10-28 PROCEDURE — 25010000002 CEFTRIAXONE PER 250 MG: Performed by: PHYSICIAN ASSISTANT

## 2021-10-28 PROCEDURE — 71045 X-RAY EXAM CHEST 1 VIEW: CPT

## 2021-10-28 PROCEDURE — 83540 ASSAY OF IRON: CPT | Performed by: NURSE PRACTITIONER

## 2021-10-28 PROCEDURE — 86870 RBC ANTIBODY IDENTIFICATION: CPT | Performed by: PHYSICIAN ASSISTANT

## 2021-10-28 PROCEDURE — 86900 BLOOD TYPING SEROLOGIC ABO: CPT | Performed by: PHYSICIAN ASSISTANT

## 2021-10-28 PROCEDURE — 82728 ASSAY OF FERRITIN: CPT | Performed by: NURSE PRACTITIONER

## 2021-10-28 PROCEDURE — 86922 COMPATIBILITY TEST ANTIGLOB: CPT

## 2021-10-28 PROCEDURE — 82140 ASSAY OF AMMONIA: CPT | Performed by: PHYSICIAN ASSISTANT

## 2021-10-28 PROCEDURE — 25010000002 OCTREOTIDE PER 25 MCG: Performed by: PHYSICIAN ASSISTANT

## 2021-10-28 PROCEDURE — 86850 RBC ANTIBODY SCREEN: CPT | Performed by: PHYSICIAN ASSISTANT

## 2021-10-28 PROCEDURE — 87040 BLOOD CULTURE FOR BACTERIA: CPT | Performed by: PHYSICIAN ASSISTANT

## 2021-10-28 PROCEDURE — 84703 CHORIONIC GONADOTROPIN ASSAY: CPT | Performed by: PHYSICIAN ASSISTANT

## 2021-10-28 PROCEDURE — 99284 EMERGENCY DEPT VISIT MOD MDM: CPT

## 2021-10-28 PROCEDURE — 99222 1ST HOSP IP/OBS MODERATE 55: CPT | Performed by: NURSE PRACTITIONER

## 2021-10-28 PROCEDURE — 85025 COMPLETE CBC W/AUTO DIFF WBC: CPT | Performed by: PHYSICIAN ASSISTANT

## 2021-10-28 PROCEDURE — 36415 COLL VENOUS BLD VENIPUNCTURE: CPT | Performed by: PHYSICIAN ASSISTANT

## 2021-10-28 PROCEDURE — 80053 COMPREHEN METABOLIC PANEL: CPT | Performed by: PHYSICIAN ASSISTANT

## 2021-10-28 PROCEDURE — G0378 HOSPITAL OBSERVATION PER HR: HCPCS

## 2021-10-28 PROCEDURE — 83605 ASSAY OF LACTIC ACID: CPT

## 2021-10-28 PROCEDURE — 86901 BLOOD TYPING SEROLOGIC RH(D): CPT | Performed by: PHYSICIAN ASSISTANT

## 2021-10-28 PROCEDURE — 83690 ASSAY OF LIPASE: CPT | Performed by: PHYSICIAN ASSISTANT

## 2021-10-28 RX ORDER — POTASSIUM CHLORIDE 1.5 G/1.77G
40 POWDER, FOR SOLUTION ORAL AS NEEDED
Status: DISCONTINUED | OUTPATIENT
Start: 2021-10-28 | End: 2021-10-31 | Stop reason: HOSPADM

## 2021-10-28 RX ORDER — PANTOPRAZOLE SODIUM 40 MG/10ML
80 INJECTION, POWDER, LYOPHILIZED, FOR SOLUTION INTRAVENOUS ONCE
Status: COMPLETED | OUTPATIENT
Start: 2021-10-28 | End: 2021-10-28

## 2021-10-28 RX ORDER — ONDANSETRON 2 MG/ML
4 INJECTION INTRAMUSCULAR; INTRAVENOUS EVERY 6 HOURS PRN
Status: DISCONTINUED | OUTPATIENT
Start: 2021-10-28 | End: 2021-10-31 | Stop reason: HOSPADM

## 2021-10-28 RX ORDER — ALUMINA, MAGNESIA, AND SIMETHICONE 2400; 2400; 240 MG/30ML; MG/30ML; MG/30ML
15 SUSPENSION ORAL EVERY 6 HOURS PRN
Status: DISCONTINUED | OUTPATIENT
Start: 2021-10-28 | End: 2021-10-31 | Stop reason: HOSPADM

## 2021-10-28 RX ORDER — OCTREOTIDE ACETATE 100 UG/ML
50 INJECTION, SOLUTION INTRAVENOUS; SUBCUTANEOUS ONCE
Status: COMPLETED | OUTPATIENT
Start: 2021-10-28 | End: 2021-10-28

## 2021-10-28 RX ORDER — MAGNESIUM SULFATE 1 G/100ML
1 INJECTION INTRAVENOUS AS NEEDED
Status: DISCONTINUED | OUTPATIENT
Start: 2021-10-28 | End: 2021-10-31 | Stop reason: HOSPADM

## 2021-10-28 RX ORDER — SODIUM CHLORIDE 0.9 % (FLUSH) 0.9 %
10 SYRINGE (ML) INJECTION EVERY 12 HOURS SCHEDULED
Status: DISCONTINUED | OUTPATIENT
Start: 2021-10-28 | End: 2021-10-31 | Stop reason: HOSPADM

## 2021-10-28 RX ORDER — SODIUM CHLORIDE 0.9 % (FLUSH) 0.9 %
10 SYRINGE (ML) INJECTION AS NEEDED
Status: DISCONTINUED | OUTPATIENT
Start: 2021-10-28 | End: 2021-10-31 | Stop reason: HOSPADM

## 2021-10-28 RX ORDER — ONDANSETRON 4 MG/1
4 TABLET, FILM COATED ORAL EVERY 6 HOURS PRN
Status: DISCONTINUED | OUTPATIENT
Start: 2021-10-28 | End: 2021-10-31 | Stop reason: HOSPADM

## 2021-10-28 RX ORDER — MAGNESIUM SULFATE HEPTAHYDRATE 40 MG/ML
2 INJECTION, SOLUTION INTRAVENOUS AS NEEDED
Status: DISCONTINUED | OUTPATIENT
Start: 2021-10-28 | End: 2021-10-31 | Stop reason: HOSPADM

## 2021-10-28 RX ORDER — POTASSIUM CHLORIDE 20 MEQ/1
40 TABLET, EXTENDED RELEASE ORAL AS NEEDED
Status: DISCONTINUED | OUTPATIENT
Start: 2021-10-28 | End: 2021-10-31 | Stop reason: HOSPADM

## 2021-10-28 RX ORDER — NITROGLYCERIN 0.4 MG/1
0.4 TABLET SUBLINGUAL
Status: DISCONTINUED | OUTPATIENT
Start: 2021-10-28 | End: 2021-10-31 | Stop reason: HOSPADM

## 2021-10-28 RX ORDER — ONDANSETRON 4 MG/1
4 TABLET, ORALLY DISINTEGRATING ORAL ONCE
Status: DISCONTINUED | OUTPATIENT
Start: 2021-10-28 | End: 2021-10-28

## 2021-10-28 RX ORDER — CALCIUM CARBONATE 200(500)MG
2 TABLET,CHEWABLE ORAL 2 TIMES DAILY PRN
Status: DISCONTINUED | OUTPATIENT
Start: 2021-10-28 | End: 2021-10-31 | Stop reason: HOSPADM

## 2021-10-28 RX ORDER — CHOLECALCIFEROL (VITAMIN D3) 125 MCG
5 CAPSULE ORAL NIGHTLY PRN
Status: DISCONTINUED | OUTPATIENT
Start: 2021-10-28 | End: 2021-10-31 | Stop reason: HOSPADM

## 2021-10-28 RX ADMIN — WATER 1 G: 100 INJECTION, SOLUTION INTRAVENOUS at 23:04

## 2021-10-28 RX ADMIN — PANTOPRAZOLE SODIUM 80 MG: 40 INJECTION, POWDER, FOR SOLUTION INTRAVENOUS at 20:55

## 2021-10-28 RX ADMIN — SODIUM CHLORIDE 500 ML: 0.9 INJECTION, SOLUTION INTRAVENOUS at 21:33

## 2021-10-28 RX ADMIN — OCTREOTIDE ACETATE 50 MCG: 100 INJECTION, SOLUTION INTRAVENOUS; SUBCUTANEOUS at 23:04

## 2021-10-28 RX ADMIN — OCTREOTIDE ACETATE 25 MCG/HR: 500 INJECTION, SOLUTION INTRAVENOUS; SUBCUTANEOUS at 23:05

## 2021-10-29 ENCOUNTER — ANESTHESIA (OUTPATIENT)
Dept: GASTROENTEROLOGY | Facility: HOSPITAL | Age: 36
End: 2021-10-29

## 2021-10-29 ENCOUNTER — INPATIENT HOSPITAL (OUTPATIENT)
Dept: URBAN - METROPOLITAN AREA HOSPITAL 84 | Facility: HOSPITAL | Age: 36
End: 2021-10-29
Payer: COMMERCIAL

## 2021-10-29 ENCOUNTER — READMISSION MANAGEMENT (OUTPATIENT)
Dept: CALL CENTER | Facility: HOSPITAL | Age: 36
End: 2021-10-29

## 2021-10-29 ENCOUNTER — APPOINTMENT (OUTPATIENT)
Dept: ULTRASOUND IMAGING | Facility: HOSPITAL | Age: 36
End: 2021-10-29

## 2021-10-29 ENCOUNTER — ANESTHESIA EVENT (OUTPATIENT)
Dept: GASTROENTEROLOGY | Facility: HOSPITAL | Age: 36
End: 2021-10-29

## 2021-10-29 DIAGNOSIS — R11.0 NAUSEA: ICD-10-CM

## 2021-10-29 DIAGNOSIS — K92.0 HEMATEMESIS: ICD-10-CM

## 2021-10-29 DIAGNOSIS — K31.89 OTHER DISEASES OF STOMACH AND DUODENUM: ICD-10-CM

## 2021-10-29 DIAGNOSIS — I85.01 ESOPHAGEAL VARICES WITH BLEEDING: ICD-10-CM

## 2021-10-29 PROBLEM — R19.7 DIARRHEA: Status: ACTIVE | Noted: 2021-10-29

## 2021-10-29 LAB
ALBUMIN SERPL-MCNC: 3.3 G/DL (ref 3.5–5.2)
ALBUMIN/GLOB SERPL: 1.3 G/DL
ALP SERPL-CCNC: 81 U/L (ref 39–117)
ALPHA-FETOPROTEIN: 2.63 NG/ML (ref 0–8.3)
ALT SERPL W P-5'-P-CCNC: 21 U/L (ref 1–33)
ANION GAP SERPL CALCULATED.3IONS-SCNC: 12 MMOL/L (ref 5–15)
AST SERPL-CCNC: 49 U/L (ref 1–32)
B PARAPERT DNA SPEC QL NAA+PROBE: NOT DETECTED
B PERT DNA SPEC QL NAA+PROBE: NOT DETECTED
BASOPHILS # BLD AUTO: 0 10*3/MM3 (ref 0–0.2)
BASOPHILS NFR BLD AUTO: 1.2 % (ref 0–1.5)
BILIRUB SERPL-MCNC: 1.7 MG/DL (ref 0–1.2)
BUN SERPL-MCNC: 11 MG/DL (ref 6–20)
BUN/CREAT SERPL: 21.6 (ref 7–25)
C PNEUM DNA NPH QL NAA+NON-PROBE: NOT DETECTED
CALCIUM SPEC-SCNC: 8 MG/DL (ref 8.6–10.5)
CHLORIDE SERPL-SCNC: 109 MMOL/L (ref 98–107)
CO2 SERPL-SCNC: 20 MMOL/L (ref 22–29)
CREAT SERPL-MCNC: 0.51 MG/DL (ref 0.57–1)
DEPRECATED RDW RBC AUTO: 65.2 FL (ref 37–54)
EOSINOPHIL # BLD AUTO: 0 10*3/MM3 (ref 0–0.4)
EOSINOPHIL NFR BLD AUTO: 0.5 % (ref 0.3–6.2)
ERYTHROCYTE [DISTWIDTH] IN BLOOD BY AUTOMATED COUNT: 20.1 % (ref 12.3–15.4)
FLUAV SUBTYP SPEC NAA+PROBE: NOT DETECTED
FLUBV RNA ISLT QL NAA+PROBE: NOT DETECTED
FOLATE SERPL-MCNC: 15.3 NG/ML (ref 4.78–24.2)
GFR SERPL CREATININE-BSD FRML MDRD: 138 ML/MIN/1.73
GLOBULIN UR ELPH-MCNC: 2.5 GM/DL
GLUCOSE SERPL-MCNC: 104 MG/DL (ref 65–99)
HADV DNA SPEC NAA+PROBE: NOT DETECTED
HAPTOGLOB SERPL-MCNC: 14 MG/DL (ref 30–200)
HCOV 229E RNA SPEC QL NAA+PROBE: NOT DETECTED
HCOV HKU1 RNA SPEC QL NAA+PROBE: NOT DETECTED
HCOV NL63 RNA SPEC QL NAA+PROBE: NOT DETECTED
HCOV OC43 RNA SPEC QL NAA+PROBE: NOT DETECTED
HCT VFR BLD AUTO: 23 % (ref 34–46.6)
HCT VFR BLD AUTO: 23.7 % (ref 34–46.6)
HCT VFR BLD AUTO: 24.6 % (ref 34–46.6)
HCT VFR BLD AUTO: 25.7 % (ref 34–46.6)
HGB BLD-MCNC: 7.4 G/DL (ref 12–15.9)
HGB BLD-MCNC: 7.6 G/DL (ref 12–15.9)
HGB BLD-MCNC: 7.9 G/DL (ref 12–15.9)
HGB BLD-MCNC: 8.4 G/DL (ref 12–15.9)
HMPV RNA NPH QL NAA+NON-PROBE: NOT DETECTED
HPIV1 RNA SPEC QL NAA+PROBE: NOT DETECTED
HPIV2 RNA SPEC QL NAA+PROBE: NOT DETECTED
HPIV3 RNA NPH QL NAA+PROBE: NOT DETECTED
HPIV4 P GENE NPH QL NAA+PROBE: NOT DETECTED
INR PPP: 1.55 (ref 0.93–1.1)
LYMPHOCYTES # BLD AUTO: 1 10*3/MM3 (ref 0.7–3.1)
LYMPHOCYTES NFR BLD AUTO: 24.7 % (ref 19.6–45.3)
M PNEUMO IGG SER IA-ACNC: NOT DETECTED
MAGNESIUM SERPL-MCNC: 1.9 MG/DL (ref 1.6–2.6)
MCH RBC QN AUTO: 29.8 PG (ref 26.6–33)
MCHC RBC AUTO-ENTMCNC: 32.4 G/DL (ref 31.5–35.7)
MCV RBC AUTO: 92.2 FL (ref 79–97)
MONOCYTES # BLD AUTO: 0.2 10*3/MM3 (ref 0.1–0.9)
MONOCYTES NFR BLD AUTO: 4.2 % (ref 5–12)
NEUTROPHILS NFR BLD AUTO: 2.9 10*3/MM3 (ref 1.7–7)
NEUTROPHILS NFR BLD AUTO: 69.4 % (ref 42.7–76)
NRBC BLD AUTO-RTO: 0 /100 WBC (ref 0–0.2)
PLATELET # BLD AUTO: 85 10*3/MM3 (ref 140–450)
PMV BLD AUTO: 8.4 FL (ref 6–12)
POTASSIUM SERPL-SCNC: 4.1 MMOL/L (ref 3.5–5.2)
PROT SERPL-MCNC: 5.8 G/DL (ref 6–8.5)
PROTHROMBIN TIME: 16.7 SECONDS (ref 9.6–11.7)
RBC # BLD AUTO: 2.49 10*6/MM3 (ref 3.77–5.28)
RHINOVIRUS RNA SPEC NAA+PROBE: NOT DETECTED
RSV RNA NPH QL NAA+NON-PROBE: NOT DETECTED
SARS-COV-2 RNA NPH QL NAA+NON-PROBE: NOT DETECTED
SODIUM SERPL-SCNC: 141 MMOL/L (ref 136–145)
VIT B12 BLD-MCNC: 1211 PG/ML (ref 211–946)
WBC # BLD AUTO: 4.2 10*3/MM3 (ref 3.4–10.8)

## 2021-10-29 PROCEDURE — 85610 PROTHROMBIN TIME: CPT | Performed by: INTERNAL MEDICINE

## 2021-10-29 PROCEDURE — 25010000002 METOCLOPRAMIDE PER 10 MG: Performed by: INTERNAL MEDICINE

## 2021-10-29 PROCEDURE — 82607 VITAMIN B-12: CPT | Performed by: NURSE PRACTITIONER

## 2021-10-29 PROCEDURE — P9016 RBC LEUKOCYTES REDUCED: HCPCS

## 2021-10-29 PROCEDURE — 99232 SBSQ HOSP IP/OBS MODERATE 35: CPT | Performed by: HOSPITALIST

## 2021-10-29 PROCEDURE — 25010000002 CEFTRIAXONE PER 250 MG: Performed by: NURSE PRACTITIONER

## 2021-10-29 PROCEDURE — 86900 BLOOD TYPING SEROLOGIC ABO: CPT

## 2021-10-29 PROCEDURE — 87902 NFCT AGT GNTYP ALYS HEP C: CPT | Performed by: INTERNAL MEDICINE

## 2021-10-29 PROCEDURE — 25010000002 ONDANSETRON PER 1 MG: Performed by: NURSE PRACTITIONER

## 2021-10-29 PROCEDURE — 85018 HEMOGLOBIN: CPT | Performed by: INTERNAL MEDICINE

## 2021-10-29 PROCEDURE — 76705 ECHO EXAM OF ABDOMEN: CPT

## 2021-10-29 PROCEDURE — 85014 HEMATOCRIT: CPT | Performed by: INTERNAL MEDICINE

## 2021-10-29 PROCEDURE — 25010000002 PROPOFOL 10 MG/ML EMULSION: Performed by: ANESTHESIOLOGY

## 2021-10-29 PROCEDURE — 0202U NFCT DS 22 TRGT SARS-COV-2: CPT | Performed by: NURSE PRACTITIONER

## 2021-10-29 PROCEDURE — 83010 ASSAY OF HAPTOGLOBIN QUANT: CPT | Performed by: NURSE PRACTITIONER

## 2021-10-29 PROCEDURE — 82746 ASSAY OF FOLIC ACID SERUM: CPT | Performed by: NURSE PRACTITIONER

## 2021-10-29 PROCEDURE — 06L38CZ OCCLUSION OF ESOPHAGEAL VEIN WITH EXTRALUMINAL DEVICE, VIA NATURAL OR ARTIFICIAL OPENING ENDOSCOPIC: ICD-10-PCS | Performed by: INTERNAL MEDICINE

## 2021-10-29 PROCEDURE — 82105 ALPHA-FETOPROTEIN SERUM: CPT | Performed by: NURSE PRACTITIONER

## 2021-10-29 PROCEDURE — 36415 COLL VENOUS BLD VENIPUNCTURE: CPT | Performed by: INTERNAL MEDICINE

## 2021-10-29 PROCEDURE — 85018 HEMOGLOBIN: CPT | Performed by: NURSE PRACTITIONER

## 2021-10-29 PROCEDURE — 85025 COMPLETE CBC W/AUTO DIFF WBC: CPT | Performed by: NURSE PRACTITIONER

## 2021-10-29 PROCEDURE — 87522 HEPATITIS C REVRS TRNSCRPJ: CPT | Performed by: INTERNAL MEDICINE

## 2021-10-29 PROCEDURE — 80053 COMPREHEN METABOLIC PANEL: CPT | Performed by: NURSE PRACTITIONER

## 2021-10-29 PROCEDURE — 36430 TRANSFUSION BLD/BLD COMPNT: CPT

## 2021-10-29 PROCEDURE — 85014 HEMATOCRIT: CPT | Performed by: NURSE PRACTITIONER

## 2021-10-29 PROCEDURE — 43244 EGD VARICES LIGATION: CPT | Performed by: INTERNAL MEDICINE

## 2021-10-29 PROCEDURE — 25010000002 OCTREOTIDE PER 25 MCG: Performed by: INTERNAL MEDICINE

## 2021-10-29 PROCEDURE — 83735 ASSAY OF MAGNESIUM: CPT | Performed by: NURSE PRACTITIONER

## 2021-10-29 RX ORDER — SODIUM CHLORIDE 9 MG/ML
INJECTION, SOLUTION INTRAVENOUS CONTINUOUS PRN
Status: DISCONTINUED | OUTPATIENT
Start: 2021-10-29 | End: 2021-10-29 | Stop reason: SURG

## 2021-10-29 RX ORDER — SODIUM CHLORIDE, SODIUM LACTATE, POTASSIUM CHLORIDE, CALCIUM CHLORIDE 600; 310; 30; 20 MG/100ML; MG/100ML; MG/100ML; MG/100ML
30 INJECTION, SOLUTION INTRAVENOUS CONTINUOUS
Status: DISCONTINUED | OUTPATIENT
Start: 2021-10-29 | End: 2021-10-31 | Stop reason: HOSPADM

## 2021-10-29 RX ORDER — SODIUM CHLORIDE 0.9 % (FLUSH) 0.9 %
3 SYRINGE (ML) INJECTION EVERY 12 HOURS SCHEDULED
Status: DISCONTINUED | OUTPATIENT
Start: 2021-10-29 | End: 2021-10-29

## 2021-10-29 RX ORDER — SODIUM CHLORIDE 0.9 % (FLUSH) 0.9 %
3-10 SYRINGE (ML) INJECTION AS NEEDED
Status: DISCONTINUED | OUTPATIENT
Start: 2021-10-29 | End: 2021-10-29

## 2021-10-29 RX ORDER — PROPOFOL 10 MG/ML
VIAL (ML) INTRAVENOUS AS NEEDED
Status: DISCONTINUED | OUTPATIENT
Start: 2021-10-29 | End: 2021-10-29 | Stop reason: SURG

## 2021-10-29 RX ORDER — PROCHLORPERAZINE MALEATE 5 MG/1
5 TABLET ORAL EVERY 6 HOURS PRN
Status: DISCONTINUED | OUTPATIENT
Start: 2021-10-29 | End: 2021-10-31 | Stop reason: HOSPADM

## 2021-10-29 RX ORDER — PANTOPRAZOLE SODIUM 40 MG/10ML
40 INJECTION, POWDER, LYOPHILIZED, FOR SOLUTION INTRAVENOUS 2 TIMES DAILY
Status: DISCONTINUED | OUTPATIENT
Start: 2021-10-29 | End: 2021-10-31 | Stop reason: HOSPADM

## 2021-10-29 RX ORDER — PROCHLORPERAZINE 25 MG
25 SUPPOSITORY, RECTAL RECTAL EVERY 12 HOURS PRN
Status: DISCONTINUED | OUTPATIENT
Start: 2021-10-29 | End: 2021-10-31 | Stop reason: HOSPADM

## 2021-10-29 RX ORDER — FUROSEMIDE 40 MG/1
40 TABLET ORAL DAILY
Status: DISCONTINUED | OUTPATIENT
Start: 2021-10-29 | End: 2021-10-31

## 2021-10-29 RX ORDER — SPIRONOLACTONE 100 MG/1
100 TABLET, FILM COATED ORAL DAILY
Status: DISCONTINUED | OUTPATIENT
Start: 2021-10-29 | End: 2021-10-31

## 2021-10-29 RX ORDER — ONDANSETRON 4 MG/1
4 TABLET, FILM COATED ORAL EVERY 6 HOURS PRN
Status: DISCONTINUED | OUTPATIENT
Start: 2021-10-29 | End: 2021-10-29 | Stop reason: SDUPTHER

## 2021-10-29 RX ORDER — ONDANSETRON 2 MG/ML
4 INJECTION INTRAMUSCULAR; INTRAVENOUS EVERY 6 HOURS PRN
Status: DISCONTINUED | OUTPATIENT
Start: 2021-10-29 | End: 2021-10-29 | Stop reason: SDUPTHER

## 2021-10-29 RX ORDER — PROCHLORPERAZINE EDISYLATE 5 MG/ML
5 INJECTION INTRAMUSCULAR; INTRAVENOUS EVERY 6 HOURS PRN
Status: DISCONTINUED | OUTPATIENT
Start: 2021-10-29 | End: 2021-10-31 | Stop reason: HOSPADM

## 2021-10-29 RX ORDER — METOCLOPRAMIDE HYDROCHLORIDE 5 MG/ML
10 INJECTION INTRAMUSCULAR; INTRAVENOUS ONCE
Status: COMPLETED | OUTPATIENT
Start: 2021-10-29 | End: 2021-10-29

## 2021-10-29 RX ADMIN — PROPOFOL 50 MG: 10 INJECTION, EMULSION INTRAVENOUS at 13:15

## 2021-10-29 RX ADMIN — Medication 10 ML: at 20:33

## 2021-10-29 RX ADMIN — SODIUM CHLORIDE: 0.9 INJECTION, SOLUTION INTRAVENOUS at 13:01

## 2021-10-29 RX ADMIN — PROPOFOL 50 MG: 10 INJECTION, EMULSION INTRAVENOUS at 13:19

## 2021-10-29 RX ADMIN — SPIRONOLACTONE 100 MG: 100 TABLET ORAL at 10:34

## 2021-10-29 RX ADMIN — PANTOPRAZOLE SODIUM 40 MG: 40 INJECTION, POWDER, FOR SOLUTION INTRAVENOUS at 08:01

## 2021-10-29 RX ADMIN — OCTREOTIDE ACETATE 25 MCG/HR: 500 INJECTION, SOLUTION INTRAVENOUS; SUBCUTANEOUS at 18:02

## 2021-10-29 RX ADMIN — PROPOFOL 50 MG: 10 INJECTION, EMULSION INTRAVENOUS at 13:11

## 2021-10-29 RX ADMIN — Medication 10 ML: at 08:01

## 2021-10-29 RX ADMIN — SODIUM CHLORIDE, POTASSIUM CHLORIDE, SODIUM LACTATE AND CALCIUM CHLORIDE 30 ML/HR: 600; 310; 30; 20 INJECTION, SOLUTION INTRAVENOUS at 04:41

## 2021-10-29 RX ADMIN — FUROSEMIDE 40 MG: 40 TABLET ORAL at 10:06

## 2021-10-29 RX ADMIN — PROPOFOL 100 MG: 10 INJECTION, EMULSION INTRAVENOUS at 13:07

## 2021-10-29 RX ADMIN — OCTREOTIDE ACETATE 25 MCG/HR: 500 INJECTION, SOLUTION INTRAVENOUS; SUBCUTANEOUS at 20:34

## 2021-10-29 RX ADMIN — Medication 10 ML: at 01:30

## 2021-10-29 RX ADMIN — CEFTRIAXONE SODIUM 1 G: 1 INJECTION, POWDER, FOR SOLUTION INTRAMUSCULAR; INTRAVENOUS at 10:06

## 2021-10-29 RX ADMIN — PROPOFOL 50 MG: 10 INJECTION, EMULSION INTRAVENOUS at 13:12

## 2021-10-29 RX ADMIN — METOCLOPRAMIDE 10 MG: 5 INJECTION, SOLUTION INTRAMUSCULAR; INTRAVENOUS at 08:01

## 2021-10-29 RX ADMIN — PANTOPRAZOLE SODIUM 40 MG: 40 INJECTION, POWDER, FOR SOLUTION INTRAVENOUS at 20:33

## 2021-10-29 RX ADMIN — ONDANSETRON 4 MG: 2 INJECTION INTRAMUSCULAR; INTRAVENOUS at 13:43

## 2021-10-29 RX ADMIN — SODIUM CHLORIDE, POTASSIUM CHLORIDE, SODIUM LACTATE AND CALCIUM CHLORIDE 30 ML/HR: 600; 310; 30; 20 INJECTION, SOLUTION INTRAVENOUS at 20:33

## 2021-10-29 NOTE — ANESTHESIA PREPROCEDURE EVALUATION
Anesthesia Evaluation     Patient summary reviewed and Nursing notes reviewed   NPO Solid Status: > 8 hours  NPO Liquid Status: > 8 hours           Airway   Mallampati: II  TM distance: >3 FB  Neck ROM: full  No difficulty expected  Dental      Pulmonary    Cardiovascular     (+) CHF ,       Neuro/Psych  GI/Hepatic/Renal/Endo    (+)  GI bleeding , hepatitis C, liver disease, renal disease CRI,     Musculoskeletal     Abdominal    Substance History      OB/GYN          Other                        Anesthesia Plan    ASA 3     MAC     intravenous induction     Anesthetic plan, all risks, benefits, and alternatives have been provided, discussed and informed consent has been obtained with: patient.

## 2021-10-29 NOTE — ANESTHESIA POSTPROCEDURE EVALUATION
Patient: Alondra Eduardo    Procedure Summary     Date: 10/29/21 Room / Location: [facility] ENDOSCOPY 1 / [facility] ENDOSCOPY    Anesthesia Start: 1306 Anesthesia Stop: 1327    Procedure: ESOPHAGOGASTRODUODENOSCOPY with banding x 4 (N/A ) Diagnosis:       Hematemesis with nausea      Gastrointestinal hemorrhage, unspecified gastrointestinal hemorrhage type      (Hematemesis with nausea [K92.0])      (Gastrointestinal hemorrhage, unspecified gastrointestinal hemorrhage type [K92.2])    Surgeons: Malick Delgado MD Provider: Johann Benjamin MD    Anesthesia Type: MAC ASA Status: 3          Anesthesia Type: MAC    Vitals  Vitals Value Taken Time   /57 10/29/21 1340   Temp     Pulse 80 10/29/21 1344   Resp     SpO2 99 % 10/29/21 1344   Vitals shown include unvalidated device data.        Post Anesthesia Care and Evaluation    Patient location during evaluation: PACU  Patient participation: complete - patient participated  Level of consciousness: awake  Pain scale: See nurse's notes for pain score.  Pain management: adequate  Airway patency: patent  Anesthetic complications: No anesthetic complications  PONV Status: none  Cardiovascular status: acceptable  Respiratory status: acceptable  Hydration status: acceptable    Comments: Patient seen and examined postoperatively; vital signs stable; SpO2 greater than or equal to 90%; cardiopulmonary status stable; nausea/vomiting adequately controlled; pain adequately controlled; no apparent anesthesia complications; patient discharged from anesthesia care when discharge criteria were met

## 2021-10-29 NOTE — OUTREACH NOTE
Medical Week 3 Survey      Responses   [facility] patient discharged from? [facility]   Does the patient have one of the following disease processes/diagnoses(primary or secondary)? Other   Week 3 attempt successful? No   Unsuccessful attempts Attempt 1   Revoke Readmitted          Isabel Mckeon RN

## 2021-10-30 ENCOUNTER — INPATIENT HOSPITAL (OUTPATIENT)
Dept: URBAN - METROPOLITAN AREA HOSPITAL 84 | Facility: HOSPITAL | Age: 36
End: 2021-10-30
Payer: COMMERCIAL

## 2021-10-30 DIAGNOSIS — K76.6 PORTAL HYPERTENSION: ICD-10-CM

## 2021-10-30 DIAGNOSIS — D62 ACUTE POSTHEMORRHAGIC ANEMIA: ICD-10-CM

## 2021-10-30 DIAGNOSIS — E87.6 HYPOKALEMIA: ICD-10-CM

## 2021-10-30 DIAGNOSIS — K92.0 HEMATEMESIS: ICD-10-CM

## 2021-10-30 DIAGNOSIS — R94.5 ABNORMAL RESULTS OF LIVER FUNCTION STUDIES: ICD-10-CM

## 2021-10-30 DIAGNOSIS — B18.2 CHRONIC VIRAL HEPATITIS C: ICD-10-CM

## 2021-10-30 DIAGNOSIS — I85.01 ESOPHAGEAL VARICES WITH BLEEDING: ICD-10-CM

## 2021-10-30 DIAGNOSIS — D72.829 ELEVATED WHITE BLOOD CELL COUNT, UNSPECIFIED: ICD-10-CM

## 2021-10-30 DIAGNOSIS — K31.89 OTHER DISEASES OF STOMACH AND DUODENUM: ICD-10-CM

## 2021-10-30 DIAGNOSIS — K70.31 ALCOHOLIC CIRRHOSIS OF LIVER WITH ASCITES: ICD-10-CM

## 2021-10-30 DIAGNOSIS — R10.9 UNSPECIFIED ABDOMINAL PAIN: ICD-10-CM

## 2021-10-30 LAB
ALBUMIN SERPL-MCNC: 2.8 G/DL (ref 3.5–5.2)
ALBUMIN/GLOB SERPL: 1.2 G/DL
ALP SERPL-CCNC: 70 U/L (ref 39–117)
ALT SERPL W P-5'-P-CCNC: 18 U/L (ref 1–33)
ANION GAP SERPL CALCULATED.3IONS-SCNC: 10 MMOL/L (ref 5–15)
AST SERPL-CCNC: 46 U/L (ref 1–32)
BASOPHILS # BLD AUTO: 0 10*3/MM3 (ref 0–0.2)
BASOPHILS NFR BLD AUTO: 1.2 % (ref 0–1.5)
BH BB BLOOD EXPIRATION DATE: NORMAL
BH BB BLOOD TYPE BARCODE: 5100
BH BB DISPENSE STATUS: NORMAL
BH BB PRODUCT CODE: NORMAL
BH BB UNIT NUMBER: NORMAL
BILIRUB SERPL-MCNC: 2 MG/DL (ref 0–1.2)
BUN SERPL-MCNC: 9 MG/DL (ref 6–20)
BUN/CREAT SERPL: 15.8 (ref 7–25)
CALCIUM SPEC-SCNC: 7.6 MG/DL (ref 8.6–10.5)
CHLORIDE SERPL-SCNC: 106 MMOL/L (ref 98–107)
CO2 SERPL-SCNC: 24 MMOL/L (ref 22–29)
CREAT SERPL-MCNC: 0.57 MG/DL (ref 0.57–1)
CROSSMATCH INTERPRETATION: NORMAL
DEPRECATED RDW RBC AUTO: 64.3 FL (ref 37–54)
EOSINOPHIL # BLD AUTO: 0.1 10*3/MM3 (ref 0–0.4)
EOSINOPHIL NFR BLD AUTO: 4 % (ref 0.3–6.2)
ERYTHROCYTE [DISTWIDTH] IN BLOOD BY AUTOMATED COUNT: 20.9 % (ref 12.3–15.4)
GFR SERPL CREATININE-BSD FRML MDRD: 121 ML/MIN/1.73
GLOBULIN UR ELPH-MCNC: 2.3 GM/DL
GLUCOSE SERPL-MCNC: 105 MG/DL (ref 65–99)
HCT VFR BLD AUTO: 21.9 % (ref 34–46.6)
HCT VFR BLD AUTO: 22 % (ref 34–46.6)
HCT VFR BLD AUTO: 22.7 % (ref 34–46.6)
HCV RNA SERPL NAA+PROBE-ACNC: NORMAL IU/ML
HCV RNA SERPL NAA+PROBE-LOG IU: 6.08 LOG10 IU/ML
HGB BLD-MCNC: 7.1 G/DL (ref 12–15.9)
HGB BLD-MCNC: 7.2 G/DL (ref 12–15.9)
HGB BLD-MCNC: 7.5 G/DL (ref 12–15.9)
HOLD SPECIMEN: NORMAL
INR PPP: 1.44 (ref 0.93–1.1)
LYMPHOCYTES # BLD AUTO: 0.9 10*3/MM3 (ref 0.7–3.1)
LYMPHOCYTES NFR BLD AUTO: 35.4 % (ref 19.6–45.3)
MAGNESIUM SERPL-MCNC: 1.6 MG/DL (ref 1.6–2.6)
MCH RBC QN AUTO: 29.4 PG (ref 26.6–33)
MCHC RBC AUTO-ENTMCNC: 32.9 G/DL (ref 31.5–35.7)
MCV RBC AUTO: 89.4 FL (ref 79–97)
MONOCYTES # BLD AUTO: 0.2 10*3/MM3 (ref 0.1–0.9)
MONOCYTES NFR BLD AUTO: 7.2 % (ref 5–12)
NEUTROPHILS NFR BLD AUTO: 1.4 10*3/MM3 (ref 1.7–7)
NEUTROPHILS NFR BLD AUTO: 52.2 % (ref 42.7–76)
NRBC BLD AUTO-RTO: 0 /100 WBC (ref 0–0.2)
PLATELET # BLD AUTO: 61 10*3/MM3 (ref 140–450)
PMV BLD AUTO: 8 FL (ref 6–12)
POTASSIUM SERPL-SCNC: 3.4 MMOL/L (ref 3.5–5.2)
POTASSIUM SERPL-SCNC: 4 MMOL/L (ref 3.5–5.2)
PROT SERPL-MCNC: 5.1 G/DL (ref 6–8.5)
PROTHROMBIN TIME: 15.6 SECONDS (ref 9.6–11.7)
RBC # BLD AUTO: 2.54 10*6/MM3 (ref 3.77–5.28)
SODIUM SERPL-SCNC: 140 MMOL/L (ref 136–145)
TEST INFORMATION: NORMAL
UNIT  ABO: NORMAL
UNIT  RH: NORMAL
WBC # BLD AUTO: 2.6 10*3/MM3 (ref 3.4–10.8)

## 2021-10-30 PROCEDURE — 25010000002 CEFTRIAXONE PER 250 MG: Performed by: INTERNAL MEDICINE

## 2021-10-30 PROCEDURE — 25010000002 MORPHINE SULFATE (PF) 2 MG/ML SOLUTION: Performed by: NURSE PRACTITIONER

## 2021-10-30 PROCEDURE — 83735 ASSAY OF MAGNESIUM: CPT | Performed by: INTERNAL MEDICINE

## 2021-10-30 PROCEDURE — 84132 ASSAY OF SERUM POTASSIUM: CPT | Performed by: HOSPITALIST

## 2021-10-30 PROCEDURE — 85025 COMPLETE CBC W/AUTO DIFF WBC: CPT | Performed by: INTERNAL MEDICINE

## 2021-10-30 PROCEDURE — 80053 COMPREHEN METABOLIC PANEL: CPT | Performed by: INTERNAL MEDICINE

## 2021-10-30 PROCEDURE — 99232 SBSQ HOSP IP/OBS MODERATE 35: CPT | Performed by: HOSPITALIST

## 2021-10-30 PROCEDURE — 85014 HEMATOCRIT: CPT | Performed by: INTERNAL MEDICINE

## 2021-10-30 PROCEDURE — 25010000002 OCTREOTIDE PER 25 MCG: Performed by: INTERNAL MEDICINE

## 2021-10-30 PROCEDURE — 85018 HEMOGLOBIN: CPT | Performed by: INTERNAL MEDICINE

## 2021-10-30 PROCEDURE — 99232 SBSQ HOSP IP/OBS MODERATE 35: CPT | Performed by: NURSE PRACTITIONER

## 2021-10-30 PROCEDURE — 85610 PROTHROMBIN TIME: CPT | Performed by: INTERNAL MEDICINE

## 2021-10-30 RX ORDER — MORPHINE SULFATE 2 MG/ML
2 INJECTION, SOLUTION INTRAMUSCULAR; INTRAVENOUS ONCE
Status: COMPLETED | OUTPATIENT
Start: 2021-10-30 | End: 2021-10-30

## 2021-10-30 RX ORDER — NADOLOL 20 MG/1
20 TABLET ORAL DAILY
Status: DISCONTINUED | OUTPATIENT
Start: 2021-10-30 | End: 2021-10-31 | Stop reason: HOSPADM

## 2021-10-30 RX ORDER — MIDODRINE HYDROCHLORIDE 5 MG/1
10 TABLET ORAL
Status: DISCONTINUED | OUTPATIENT
Start: 2021-10-30 | End: 2021-10-31 | Stop reason: HOSPADM

## 2021-10-30 RX ORDER — FOLIC ACID 1 MG/1
1 TABLET ORAL DAILY
Status: DISCONTINUED | OUTPATIENT
Start: 2021-10-30 | End: 2021-10-31 | Stop reason: HOSPADM

## 2021-10-30 RX ORDER — ZINC SULFATE 50(220)MG
220 CAPSULE ORAL DAILY
Status: DISCONTINUED | OUTPATIENT
Start: 2021-10-30 | End: 2021-10-31 | Stop reason: HOSPADM

## 2021-10-30 RX ADMIN — OCTREOTIDE ACETATE 25 MCG/HR: 500 INJECTION, SOLUTION INTRAVENOUS; SUBCUTANEOUS at 06:30

## 2021-10-30 RX ADMIN — MORPHINE SULFATE 2 MG: 2 INJECTION, SOLUTION INTRAMUSCULAR; INTRAVENOUS at 06:01

## 2021-10-30 RX ADMIN — OCTREOTIDE ACETATE 25 MCG/HR: 500 INJECTION, SOLUTION INTRAVENOUS; SUBCUTANEOUS at 06:29

## 2021-10-30 RX ADMIN — Medication 10 ML: at 10:03

## 2021-10-30 RX ADMIN — PANTOPRAZOLE SODIUM 40 MG: 40 INJECTION, POWDER, FOR SOLUTION INTRAVENOUS at 20:26

## 2021-10-30 RX ADMIN — MIDODRINE HYDROCHLORIDE 10 MG: 5 TABLET ORAL at 17:06

## 2021-10-30 RX ADMIN — PANTOPRAZOLE SODIUM 40 MG: 40 INJECTION, POWDER, FOR SOLUTION INTRAVENOUS at 10:03

## 2021-10-30 RX ADMIN — SPIRONOLACTONE 100 MG: 100 TABLET ORAL at 10:03

## 2021-10-30 RX ADMIN — FOLIC ACID 1 MG: 1 TABLET ORAL at 17:06

## 2021-10-30 RX ADMIN — SODIUM CHLORIDE, POTASSIUM CHLORIDE, SODIUM LACTATE AND CALCIUM CHLORIDE 30 ML/HR: 600; 310; 30; 20 INJECTION, SOLUTION INTRAVENOUS at 06:01

## 2021-10-30 RX ADMIN — CEFTRIAXONE SODIUM 1 G: 1 INJECTION, POWDER, FOR SOLUTION INTRAMUSCULAR; INTRAVENOUS at 10:03

## 2021-10-30 RX ADMIN — FUROSEMIDE 40 MG: 40 TABLET ORAL at 10:02

## 2021-10-30 RX ADMIN — POTASSIUM CHLORIDE 40 MEQ: 1500 TABLET, EXTENDED RELEASE ORAL at 17:06

## 2021-10-30 RX ADMIN — Medication 10 ML: at 20:26

## 2021-10-30 RX ADMIN — NADOLOL 20 MG: 20 TABLET ORAL at 17:06

## 2021-10-30 RX ADMIN — OCTREOTIDE ACETATE 25 MCG/HR: 500 INJECTION, SOLUTION INTRAVENOUS; SUBCUTANEOUS at 17:02

## 2021-10-30 RX ADMIN — Medication 100 MG: at 17:06

## 2021-10-30 RX ADMIN — POTASSIUM CHLORIDE 40 MEQ: 1500 TABLET, EXTENDED RELEASE ORAL at 12:25

## 2021-10-30 RX ADMIN — Medication 220 MG: at 17:06

## 2021-10-31 ENCOUNTER — READMISSION MANAGEMENT (OUTPATIENT)
Dept: CALL CENTER | Facility: HOSPITAL | Age: 36
End: 2021-10-31

## 2021-10-31 VITALS
TEMPERATURE: 98.3 F | SYSTOLIC BLOOD PRESSURE: 98 MMHG | RESPIRATION RATE: 18 BRPM | OXYGEN SATURATION: 91 % | BODY MASS INDEX: 27.46 KG/M2 | WEIGHT: 155 LBS | DIASTOLIC BLOOD PRESSURE: 52 MMHG | HEART RATE: 79 BPM | HEIGHT: 63 IN

## 2021-10-31 PROBLEM — K92.2 ACUTE UPPER GI BLEED: Status: RESOLVED | Noted: 2021-10-03 | Resolved: 2021-10-31

## 2021-10-31 PROBLEM — K92.2 GASTROINTESTINAL HEMORRHAGE: Status: RESOLVED | Noted: 2021-10-04 | Resolved: 2021-10-31

## 2021-10-31 PROBLEM — K92.0 HEMATEMESIS WITH NAUSEA: Status: RESOLVED | Noted: 2021-10-28 | Resolved: 2021-10-31

## 2021-10-31 PROBLEM — R19.7 DIARRHEA: Status: RESOLVED | Noted: 2021-10-29 | Resolved: 2021-10-31

## 2021-10-31 LAB
ALBUMIN SERPL-MCNC: 2.4 G/DL (ref 3.5–5.2)
ALBUMIN/GLOB SERPL: 1.1 G/DL
ALP SERPL-CCNC: 64 U/L (ref 39–117)
ALT SERPL W P-5'-P-CCNC: 16 U/L (ref 1–33)
ANION GAP SERPL CALCULATED.3IONS-SCNC: 8 MMOL/L (ref 5–15)
AST SERPL-CCNC: 39 U/L (ref 1–32)
BASOPHILS # BLD AUTO: 0 10*3/MM3 (ref 0–0.2)
BASOPHILS NFR BLD AUTO: 0.9 % (ref 0–1.5)
BILIRUB SERPL-MCNC: 1.7 MG/DL (ref 0–1.2)
BUN SERPL-MCNC: 6 MG/DL (ref 6–20)
BUN/CREAT SERPL: 9.7 (ref 7–25)
CALCIUM SPEC-SCNC: 7.3 MG/DL (ref 8.6–10.5)
CHLORIDE SERPL-SCNC: 105 MMOL/L (ref 98–107)
CO2 SERPL-SCNC: 24 MMOL/L (ref 22–29)
CREAT SERPL-MCNC: 0.62 MG/DL (ref 0.57–1)
DEPRECATED RDW RBC AUTO: 70.4 FL (ref 37–54)
EOSINOPHIL # BLD AUTO: 0.1 10*3/MM3 (ref 0–0.4)
EOSINOPHIL NFR BLD AUTO: 4.1 % (ref 0.3–6.2)
ERYTHROCYTE [DISTWIDTH] IN BLOOD BY AUTOMATED COUNT: 21.8 % (ref 12.3–15.4)
GFR SERPL CREATININE-BSD FRML MDRD: 110 ML/MIN/1.73
GLOBULIN UR ELPH-MCNC: 2.2 GM/DL
GLUCOSE SERPL-MCNC: 114 MG/DL (ref 65–99)
HCT VFR BLD AUTO: 23 % (ref 34–46.6)
HGB BLD-MCNC: 7.3 G/DL (ref 12–15.9)
INR PPP: 1.55 (ref 0.93–1.1)
LYMPHOCYTES # BLD AUTO: 1 10*3/MM3 (ref 0.7–3.1)
LYMPHOCYTES NFR BLD AUTO: 37.1 % (ref 19.6–45.3)
MAGNESIUM SERPL-MCNC: 1.7 MG/DL (ref 1.6–2.6)
MCH RBC QN AUTO: 28.9 PG (ref 26.6–33)
MCHC RBC AUTO-ENTMCNC: 31.9 G/DL (ref 31.5–35.7)
MCV RBC AUTO: 90.7 FL (ref 79–97)
MONOCYTES # BLD AUTO: 0.2 10*3/MM3 (ref 0.1–0.9)
MONOCYTES NFR BLD AUTO: 8.5 % (ref 5–12)
NEUTROPHILS NFR BLD AUTO: 1.3 10*3/MM3 (ref 1.7–7)
NEUTROPHILS NFR BLD AUTO: 49.4 % (ref 42.7–76)
NRBC BLD AUTO-RTO: 0.3 /100 WBC (ref 0–0.2)
PLATELET # BLD AUTO: 64 10*3/MM3 (ref 140–450)
PMV BLD AUTO: 8.2 FL (ref 6–12)
POTASSIUM SERPL-SCNC: 3.8 MMOL/L (ref 3.5–5.2)
PROT SERPL-MCNC: 4.6 G/DL (ref 6–8.5)
PROTHROMBIN TIME: 16.7 SECONDS (ref 9.6–11.7)
RBC # BLD AUTO: 2.54 10*6/MM3 (ref 3.77–5.28)
SODIUM SERPL-SCNC: 137 MMOL/L (ref 136–145)
WBC # BLD AUTO: 2.7 10*3/MM3 (ref 3.4–10.8)

## 2021-10-31 PROCEDURE — 36415 COLL VENOUS BLD VENIPUNCTURE: CPT | Performed by: INTERNAL MEDICINE

## 2021-10-31 PROCEDURE — 99231 SBSQ HOSP IP/OBS SF/LOW 25: CPT | Performed by: NURSE PRACTITIONER

## 2021-10-31 PROCEDURE — 25010000002 CEFTRIAXONE PER 250 MG: Performed by: INTERNAL MEDICINE

## 2021-10-31 PROCEDURE — 85025 COMPLETE CBC W/AUTO DIFF WBC: CPT | Performed by: INTERNAL MEDICINE

## 2021-10-31 PROCEDURE — 83735 ASSAY OF MAGNESIUM: CPT | Performed by: INTERNAL MEDICINE

## 2021-10-31 PROCEDURE — 99238 HOSP IP/OBS DSCHRG MGMT 30/<: CPT | Performed by: HOSPITALIST

## 2021-10-31 PROCEDURE — 85610 PROTHROMBIN TIME: CPT | Performed by: NURSE PRACTITIONER

## 2021-10-31 PROCEDURE — 80053 COMPREHEN METABOLIC PANEL: CPT | Performed by: INTERNAL MEDICINE

## 2021-10-31 PROCEDURE — 25010000002 NA FERRIC GLUC CPLX PER 12.5 MG: Performed by: NURSE PRACTITIONER

## 2021-10-31 PROCEDURE — 25010000002 OCTREOTIDE PER 25 MCG: Performed by: NURSE PRACTITIONER

## 2021-10-31 RX ORDER — MIDODRINE HYDROCHLORIDE 10 MG/1
10 TABLET ORAL
Qty: 90 TABLET | Refills: 3 | Status: SHIPPED | OUTPATIENT
Start: 2021-10-31

## 2021-10-31 RX ORDER — FUROSEMIDE 20 MG/1
60 TABLET ORAL DAILY
Qty: 30 TABLET | Refills: 0 | Status: SHIPPED | OUTPATIENT
Start: 2021-11-01

## 2021-10-31 RX ORDER — NADOLOL 20 MG/1
20 TABLET ORAL DAILY
Qty: 30 TABLET | Refills: 0 | Status: SHIPPED | OUTPATIENT
Start: 2021-10-31

## 2021-10-31 RX ORDER — ZINC SULFATE 50(220)MG
220 CAPSULE ORAL DAILY
Qty: 30 CAPSULE | Refills: 0 | Status: SHIPPED | OUTPATIENT
Start: 2021-10-31

## 2021-10-31 RX ORDER — LACTULOSE 10 G/15ML
30 SOLUTION ORAL 3 TIMES DAILY
Qty: 30 ML | Refills: 0 | Status: SHIPPED | OUTPATIENT
Start: 2021-10-31

## 2021-10-31 RX ORDER — LACTULOSE 10 G/15ML
30 SOLUTION ORAL 3 TIMES DAILY
Status: DISCONTINUED | OUTPATIENT
Start: 2021-10-31 | End: 2021-10-31 | Stop reason: HOSPADM

## 2021-10-31 RX ORDER — SPIRONOLACTONE 50 MG/1
150 TABLET, FILM COATED ORAL DAILY
Qty: 30 TABLET | Refills: 0 | Status: SHIPPED | OUTPATIENT
Start: 2021-11-01

## 2021-10-31 RX ADMIN — SODIUM CHLORIDE 250 MG: 9 INJECTION, SOLUTION INTRAVENOUS at 12:17

## 2021-10-31 RX ADMIN — LACTULOSE 30 G: 20 SOLUTION ORAL at 15:21

## 2021-10-31 RX ADMIN — Medication 10 ML: at 09:04

## 2021-10-31 RX ADMIN — SODIUM CHLORIDE, POTASSIUM CHLORIDE, SODIUM LACTATE AND CALCIUM CHLORIDE 30 ML/HR: 600; 310; 30; 20 INJECTION, SOLUTION INTRAVENOUS at 10:44

## 2021-10-31 RX ADMIN — SPIRONOLACTONE 100 MG: 100 TABLET ORAL at 09:04

## 2021-10-31 RX ADMIN — OCTREOTIDE ACETATE 25 MCG/HR: 500 INJECTION, SOLUTION INTRAVENOUS; SUBCUTANEOUS at 13:02

## 2021-10-31 RX ADMIN — CEFTRIAXONE SODIUM 1 G: 1 INJECTION, POWDER, FOR SOLUTION INTRAMUSCULAR; INTRAVENOUS at 09:03

## 2021-10-31 RX ADMIN — Medication 100 MG: at 09:03

## 2021-10-31 RX ADMIN — LACTULOSE 30 G: 20 SOLUTION ORAL at 11:06

## 2021-10-31 RX ADMIN — FOLIC ACID 1 MG: 1 TABLET ORAL at 09:04

## 2021-10-31 RX ADMIN — PHENOL 2 SPRAY: 1.4 SPRAY ORAL at 10:48

## 2021-10-31 RX ADMIN — MIDODRINE HYDROCHLORIDE 10 MG: 5 TABLET ORAL at 10:48

## 2021-10-31 RX ADMIN — PANTOPRAZOLE SODIUM 40 MG: 40 INJECTION, POWDER, FOR SOLUTION INTRAVENOUS at 09:04

## 2021-10-31 RX ADMIN — RIFAXIMIN 550 MG: 550 TABLET ORAL at 11:06

## 2021-10-31 RX ADMIN — NADOLOL 20 MG: 20 TABLET ORAL at 09:04

## 2021-10-31 RX ADMIN — MIDODRINE HYDROCHLORIDE 10 MG: 5 TABLET ORAL at 09:04

## 2021-10-31 RX ADMIN — Medication 220 MG: at 09:04

## 2021-10-31 RX ADMIN — FUROSEMIDE 40 MG: 40 TABLET ORAL at 09:04

## 2021-11-01 NOTE — CASE MANAGEMENT/SOCIAL WORK
Case Management Discharge Note           Provided Post Acute Provider List?: N/A  Provided Post Acute Provider Quality & Resource List?: N/A              Final Discharge Disposition Code: 01 - home or self-care

## 2021-11-02 ENCOUNTER — READMISSION MANAGEMENT (OUTPATIENT)
Dept: CALL CENTER | Facility: HOSPITAL | Age: 36
End: 2021-11-02

## 2021-11-02 LAB
BACTERIA SPEC AEROBE CULT: NORMAL
BACTERIA SPEC AEROBE CULT: NORMAL
HCV GENTYP SERPL NAA+PROBE: NORMAL
LABORATORY COMMENT REPORT: NORMAL

## 2021-11-02 NOTE — OUTREACH NOTE
Medical Week 1 Survey      Responses   [facility] patient discharged from? [facility]   Does the patient have one of the following disease processes/diagnoses(primary or secondary)? Other   Week 1 attempt successful? No   Unsuccessful attempts Attempt 2          Chelita Tang LPN

## 2021-11-04 NOTE — PAYOR COMM NOTE
"UTILIZATION REVIEW  JANAE GOMEZ RN   PH:   FAX: [phone]    [facility]  NPI# [id number]  TID # [id number]  ======================    RE:  [id number]    DC NOTIFICATION           Margaux Eduardo (36 y.o. Female)             Date of Birth Social Security Number Address Home Phone MRN    1985  [id number] [place] [address] [phone] [id number]    Church Marital Status             None Single       Admission Date Admission Type Admitting Provider Attending Provider Department, Room/Bed    10/28/21 Emergency Galindo Jones DO  [facility] NEURO HEART, 263/    Discharge Date Discharge Disposition Discharge Destination          10/31/2021 Home or Self Care              Attending Provider: (none)   Allergies: No Known Allergies    Isolation: None   Infection: None   Code Status: Prior   Advance Care Planning Activity    Ht: 160 cm (63\")   Wt: 70.3 kg (155 lb)    Admission Cmt: None   Principal Problem: Esophageal varices (HCC) [I85.00]                 Active Insurance as of 10/28/2021     Primary Coverage     Payor Plan Insurance Group Employer/Plan Group    [facility] [id number]      Payor Plan Address Payor Plan Phone Number Payor Plan Fax Number Effective Dates    [address]   2021 - None Entered    [facility] [id number]       Subscriber Name Subscriber Birth Date Member ID       MARGAUX EDUARDO 1985 [id number]                 Emergency Contacts      (Rel.) Home Phone Work Phone Mobile Phone    Ivan Sheikh (Father) -- -- [phone]               Discharge Summary      Galindo Jones DO at 10/31/21 1801                       [facility] Medicine Services  DISCHARGE SUMMARY    Patient Name: Margaux Eduardo  : 1985  MRN: [id number]    Date of Admission: 10/28/2021  Date of Discharge:  10/31/2021  Primary Care Physician: Irene Henriquez MD      Presenting " Problem:   Hypokalemia [E87.6]  Hematemesis with nausea [K92.0]  Bleeding esophageal varices, unspecified esophageal varices type (HCC) [I85.01]  Anemia, unspecified type [D64.9]  Gastrointestinal hemorrhage, unspecified gastrointestinal hemorrhage type [K92.2]    Active and Resolved Hospital Problems:  Active Hospital Problems    Diagnosis POA   • **Esophageal varices (HCC) [I85.00] Yes     Priority: High   • Diastolic CHF, acute (HCC) [I50.31] Yes     Priority: Medium      Resolved Hospital Problems    Diagnosis POA   • Acute upper GI bleed [K92.2] Yes     Priority: High   • Diarrhea [R19.7] Unknown     Priority: Medium   • Hematemesis with nausea [K92.0] Yes     Priority: Medium   • Gastrointestinal hemorrhage [K92.2] Unknown     Priority: Medium         Hospital Course     Hospital Course:      36-year-old female with history of alcoholic/hepatitis C cirrhosis complicated with esophageal varices s/p banding and ascites s/p recent outpatient paracentesis. The  patient presented to the emergency room on 10/28/2021 with upper GI bleed from variceal bleed and is s/p EGD and banding x4  on 10/29/2020 and treated with 72 hours of octreotide drip.  The patient was discharged home on 10/31/2021 and has been given prescriptions for her medications..  She was advised to be compliant with with her medications and follow-up with GI in 3-4 weeks for repeat EGD for variceal eradication.        Problems addressed during the hospitalization    Upper GI bleed from affected esophageal variceal bleed (POA): Resolved  -s/p EGD on 10/29/2021 with banding of esophageal varices x4.  Patient with grade 3 esophageal varices distal third of esophagus with multiple red well markings with mild portal hypertensive gastropathy with minimal petechiae.  Normal duodenum.  -Treated with 72 hours of octreotide drip   -Repeat EGD 3-4 weeks for variceal eradication  -Discharged home on 10/31/21and follow up with GI in the office     Alcoholic and  hepatitis C causing cirrhosis complicated with esophageal varices with history of band ligation in October 2021 and ascites s/p recent paracentesis 10/27/2021 with Dr. Stapleton:  -Not compliant with nadolol and other medication  -Follows with Dr. Ochoa  -s/p Ultrasound of the liver   -Advised low-sodium diet  -Denies alcohol abuse  -Needs outpatient follow-up for treatment of hepatitis C: Epclusa.    -Hep C quantitative RNA and genotype ordered during the hospitalization follow-up with GI in the office        History of tobacco abuse:  -Claims to have quit smoking         DISCHARGE Follow Up Recommendations for labs and diagnostics:       Reasons For Change In Medications and Indications for New Medications:      Day of Discharge     Vital Signs:  Temp:  [98.2 °F (36.8 °C)-99 °F (37.2 °C)] 98.3 °F (36.8 °C)  Heart Rate:  [64-85] 79  Resp:  [16-18] 18  BP: ()/(49-70) 98/52    Physical Exam:  Physical Exam  HENT:      Head: Normocephalic.      Nose: Nose normal.   Eyes:      Extraocular Movements: Extraocular movements intact.      Pupils: Pupils are equal, round, and reactive to light.   Cardiovascular:      Rate and Rhythm: Normal rate and regular rhythm.      Pulses: Normal pulses.   Pulmonary:      Effort: Pulmonary effort is normal.   Abdominal:      General: Bowel sounds are normal.   Musculoskeletal:         General: Normal range of motion.      Cervical back: Normal range of motion.   Skin:     General: Skin is warm.   Neurological:      Mental Status: She is alert and oriented to person, place, and time. Mental status is at baseline.   Psychiatric:         Mood and Affect: Mood normal.           Pertinent  and/or Most Recent Results     LAB RESULTS:      Lab 10/31/21  0410 10/30/21  1209 10/30/21  0603 10/29/21  2343 10/29/21  1731 10/29/21  1258 10/29/21  0852 10/29/21  0631 10/29/21  0018 10/28/21  2059 10/28/21  2052 10/28/21  2052   WBC 2.70*  --  2.60*  --   --   --   --   --  4.20  --   --  3.90    HEMOGLOBIN 7.3* 7.1* 7.5* 7.2* 7.6*   < >  --    < > 7.4*  --    < > 7.0*   HEMATOCRIT 23.0* 21.9* 22.7* 22.0* 23.7*   < >  --    < > 23.0*  --    < > 21.7*   PLATELETS 64*  --  61*  --   --   --   --   --  85*  --   --  83*   NEUTROS ABS 1.30*  --  1.40*  --   --   --   --   --  2.90  --   --  2.70   LYMPHS ABS 1.00  --  0.90  --   --   --   --   --  1.00  --   --  1.00   MONOS ABS 0.20  --  0.20  --   --   --   --   --  0.20  --   --  0.20   EOS ABS 0.10  --  0.10  --   --   --   --   --  0.00  --   --  0.00   MCV 90.7  --  89.4  --   --   --   --   --  92.2  --   --  93.1   LACTATE  --   --   --   --   --   --   --   --   --  1.4  --   --    PROTIME 16.7*  --  15.6*  --   --   --  16.7*  --   --   --   --   --     < > = values in this interval not displayed.         Lab 10/31/21  0410 10/30/21  2048 10/30/21  0603 10/29/21  0018 10/28/21  2053   SODIUM 137  --  140 141 138   POTASSIUM 3.8 4.0 3.4* 4.1 3.3*   CHLORIDE 105  --  106 109* 106   CO2 24.0  --  24.0 20.0* 21.0*   ANION GAP 8.0  --  10.0 12.0 11.0   BUN 6  --  9 11 11   CREATININE 0.62  --  0.57 0.51* 0.50*   GLUCOSE 114*  --  105* 104* 103*   CALCIUM 7.3*  --  7.6* 8.0* 8.0*   MAGNESIUM 1.7  --  1.6 1.9  --          Lab 10/31/21  0410 10/30/21  0603 10/29/21  0018 10/28/21  2053   TOTAL PROTEIN 4.6* 5.1* 5.8* 5.3*   ALBUMIN 2.40* 2.80* 3.30* 3.00*   GLOBULIN 2.2 2.3 2.5 2.3   ALT (SGPT) 16 18 21 19   AST (SGOT) 39* 46* 49* 44*   BILIRUBIN 1.7* 2.0* 1.7* 1.6*   ALK PHOS 64 70 81 75   LIPASE  --   --   --  36         Lab 10/31/21  0410 10/30/21  0603 10/29/21  0852   PROTIME 16.7* 15.6* 16.7*   INR 1.55* 1.44* 1.55*             Lab 10/29/21  0018 10/28/21  2142 10/28/21  2053   IRON  --   --  31*   IRON SATURATION  --   --  15*   TIBC  --   --  212*   TRANSFERRIN  --   --  142*   FERRITIN  --   --  43.43   FOLATE 15.30  --   --    VITAMIN B 12 1,211*  --   --    ABO TYPING  --  O  --    RH TYPING  --  Positive  --    ANTIBODY SCREEN  --  Positive  --           Brief Urine Lab Results  (Last result in the past 365 days)      Color   Clarity   Blood   Leuk Est   Nitrite   Protein   CREAT   Urine HCG        08/10/21 1827               Negative           Microbiology Results (last 10 days)     Procedure Component Value - Date/Time    COVID PRE-OP / PRE-PROCEDURE SCREENING ORDER (NO ISOLATION) - Swab, Nasopharynx [[id number]]  (Normal) Collected: 10/29/21 0030    Lab Status: Final result Specimen: Swab from Nasopharynx Updated: 10/29/21 0122    Narrative:      The following orders were created for panel order COVID PRE-OP / PRE-PROCEDURE SCREENING ORDER (NO ISOLATION) - Swab, Nasopharynx.  Procedure                               Abnormality         Status                     ---------                               -----------         ------                     Respiratory Panel PCR w/...[[id number]]  Normal              Final result                 Please view results for these tests on the individual orders.    Respiratory Panel PCR w/COVID-19(SARS-CoV-2) ADRIANNE/KAREN/PATIENCE/PAD/COR/MAD/ELDON In-House, NP Swab in UTM/VTM, 3-4 HR TAT - Swab, Nasopharynx [[id number]]  (Normal) Collected: 10/29/21 0030    Lab Status: Final result Specimen: Swab from Nasopharynx Updated: 10/29/21 0122     ADENOVIRUS, PCR Not Detected     Coronavirus 229E Not Detected     Coronavirus HKU1 Not Detected     Coronavirus NL63 Not Detected     Coronavirus OC43 Not Detected     COVID19 Not Detected     Human Metapneumovirus Not Detected     Human Rhinovirus/Enterovirus Not Detected     Influenza A PCR Not Detected     Influenza B PCR Not Detected     Parainfluenza Virus 1 Not Detected     Parainfluenza Virus 2 Not Detected     Parainfluenza Virus 3 Not Detected     Parainfluenza Virus 4 Not Detected     RSV, PCR Not Detected     Bordetella pertussis pcr Not Detected     Bordetella parapertussis PCR Not Detected     Chlamydophila pneumoniae PCR Not Detected     Mycoplasma pneumo by PCR Not Detected     Narrative:      In the setting of a positive respiratory panel with a viral infection PLUS a negative procalcitonin without other underlying concern for bacterial infection, consider observing off antibiotics or discontinuation of antibiotics and continue supportive care. If the respiratory panel is positive for atypical bacterial infection (Bordetella pertussis, Chlamydophila pneumoniae, or Mycoplasma pneumoniae), consider antibiotic de-escalation to target atypical bacterial infection.    Blood Culture - Blood, Arm, Right [[id number]]  (Normal) Collected: 10/28/21 2142    Lab Status: Preliminary result Specimen: Blood from Arm, Right Updated: 10/30/21 2200     Blood Culture No growth at 2 days    Blood Culture - Blood, Arm, Left [[id number]]  (Normal) Collected: 10/28/21 2053    Lab Status: Preliminary result Specimen: Blood from Arm, Left Updated: 10/30/21 2100     Blood Culture No growth at 2 days          US Liver    Result Date: 10/29/2021  Impression: 1. Cirrhotic liver morphology without focal liver lesion. 2. Patent hepatopedal flow within the main portal vein.  Electronically Signed By-Andrew Vital MD On:10/29/2021 11:34 AM This report was finalized on [id number] by  Andrew Vital MD.    XR Chest 1 View    Result Date: 10/28/2021  Impression: 1.An acute pulmonary process is not confirmed on this exam.  Electronically Signed By-Darinel Bravo MD On:10/28/2021 9:46 PM This report was finalized on [id number] by  Darinel Bravo MD.              Results for orders placed during the hospital encounter of 08/09/21    Adult Transthoracic Echo Complete w/ Color, Spectral and Contrast if Necessary Per Protocol    Interpretation Summary  · Estimated left ventricular EF was in disagreement with the calculated left ventricular EF. Left ventricular ejection fraction appears to be greater than 70%. Left ventricular systolic function is normal.  · Left ventricular diastolic function was normal.  · Estimated right  ventricular systolic pressure from tricuspid regurgitation is normal (<35 mmHg).      Labs Pending at Discharge:  Pending Labs     Order Current Status    Hepatitis C Genotype In process    Blood Culture - Blood, Arm, Left Preliminary result    Blood Culture - Blood, Arm, Right Preliminary result          Procedures Performed  Procedure(s):  ESOPHAGOGASTRODUODENOSCOPY with banding x 4         Consults:   Consults     Date and Time Order Name Status Description    10/29/2021  1:25 AM Inpatient Gastroenterology Consult Completed     10/28/2021 10:23 PM Hospitalist (on-call MD unless specified) Completed     10/3/2021  5:35 AM Gastroenterology Consult Completed     10/3/2021  5:14 AM Hospitalist (on-call MD unless specified) Completed             Discharge Details        Discharge Medications      New Medications      Instructions Start Date   furosemide 20 MG tablet  Commonly known as: LASIX   60 mg, Oral, Daily   Start Date: November 1, 2021     lactulose 10 GM/15ML solution  Commonly known as: CHRONULAC   30 g, Oral, 3 Times Daily      riFAXIMin 550 MG tablet  Commonly known as: XIFAXAN   550 mg, Oral, Every 12 Hours Scheduled      spironolactone 50 MG tablet  Commonly known as: ALDACTONE   150 mg, Oral, Daily   Start Date: November 1, 2021        Continue These Medications      Instructions Start Date   folic acid 1 MG tablet  Commonly known as: FOLVITE   1 mg, Oral, Daily      midodrine 10 MG tablet  Commonly known as: PROAMATINE   10 mg, Oral, 3 Times Daily Before Meals      nadolol 20 MG tablet  Commonly known as: CORGARD   20 mg, Oral, Daily      ondansetron 4 MG tablet  Commonly known as: ZOFRAN   4 mg, Oral, Every 6 Hours PRN      pantoprazole 40 MG EC tablet  Commonly known as: PROTONIX   40 mg, Oral, 2 Times Daily      thiamine 100 MG tablet tablet  Commonly known as: VITAMIN B-1   100 mg, Oral, Daily      zinc sulfate 220 (50 Zn) MG capsule  Commonly known as: ZINCATE   220 mg, Oral, Daily             No  Known Allergies      Discharge Disposition:   Home or Self Care    Diet:  Hospital:  Diet Order   Procedures   • Diet Renal; 2gm Na+         Discharge Activity: as tolerated      Discharge Condition: stable      CODE STATUS:  Code Status and Medical Interventions:   Ordered at: 10/28/21 5928     Level Of Support Discussed With:    Patient     Code Status:    CPR     Medical Interventions (Level of Support Prior to Arrest):    Full         No future appointments.    Additional Instructions for the Follow-ups that You Need to Schedule     Discharge Follow-up with PCP   As directed       Currently Documented PCP:    Irene Henriquez MD    PCP Phone Number:    [phone]     Follow Up Details: 2 weeks         Discharge Follow-up with Specified Provider: Dr. Ochoa, gastroenterology; 1 Month   As directed      To: Dr. Ochoa, gastroenterology    Follow Up: 1 Month    Follow Up Details: Repeat EGD for variceal eradication               Time spent on Discharge including face to face service:  15 minutes    This patient has been examined wearing appropriate Personal Protective Equipment and discussed with nursing. 10/31/21      Signature: Electronically signed by Galindo Jones DO, 10/31/21, 6:01 PM EDT.      Electronically signed by Galindo Jones DO at 10/31/21 9221

## 2021-11-09 ENCOUNTER — READMISSION MANAGEMENT (OUTPATIENT)
Dept: CALL CENTER | Facility: HOSPITAL | Age: 36
End: 2021-11-09

## 2021-11-09 NOTE — OUTREACH NOTE
Medical Week 2 Survey      Responses   [facility] patient discharged from? [facility]   Does the patient have one of the following disease processes/diagnoses(primary or secondary)? Other   Week 2 attempt successful? No   Unsuccessful attempts Attempt 1          Chelita Tang LPN

## 2021-11-10 ENCOUNTER — READMISSION MANAGEMENT (OUTPATIENT)
Dept: CALL CENTER | Facility: HOSPITAL | Age: 36
End: 2021-11-10

## 2021-11-11 NOTE — OUTREACH NOTE
Medical Week 3 Survey      Responses   [facility] patient discharged from? [facility]   Does the patient have one of the following disease processes/diagnoses(primary or secondary)? Other   Week 3 attempt successful? No   Unsuccessful attempts Attempt 1          Sandrita Smith RN

## 2021-11-11 NOTE — OUTREACH NOTE
Medical Week 2 Survey      Responses   [facility] patient discharged from? [facility]   Does the patient have one of the following disease processes/diagnoses(primary or secondary)? Other   Week 2 attempt successful? No   Unsuccessful attempts Attempt 2          Azalia Polanco RN

## 2021-11-15 ENCOUNTER — READMISSION MANAGEMENT (OUTPATIENT)
Dept: CALL CENTER | Facility: HOSPITAL | Age: 36
End: 2021-11-15

## 2021-11-15 NOTE — OUTREACH NOTE
Medical Week 3 Survey      Responses   [facility] patient discharged from? [facility]   Does the patient have one of the following disease processes/diagnoses(primary or secondary)? Other   Week 3 attempt successful? No   Unsuccessful attempts Attempt 2          Chelita Tang LPN

## 2021-11-26 ENCOUNTER — LAB (OUTPATIENT)
Dept: LAB | Facility: HOSPITAL | Age: 36
End: 2021-11-26

## 2021-11-26 ENCOUNTER — ANESTHESIA EVENT (OUTPATIENT)
Dept: GASTROENTEROLOGY | Facility: HOSPITAL | Age: 36
End: 2021-11-26

## 2021-11-26 PROCEDURE — C9803 HOPD COVID-19 SPEC COLLECT: HCPCS

## 2021-11-26 PROCEDURE — U0004 COV-19 TEST NON-CDC HGH THRU: HCPCS

## 2021-11-27 LAB — SARS-COV-2 ORF1AB RESP QL NAA+PROBE: NOT DETECTED

## 2021-11-29 ENCOUNTER — HOSPITAL ENCOUNTER (OUTPATIENT)
Facility: HOSPITAL | Age: 36
Setting detail: HOSPITAL OUTPATIENT SURGERY
Discharge: HOME | End: 2021-11-29
Attending: INTERNAL MEDICINE | Admitting: INTERNAL MEDICINE

## 2021-11-29 ENCOUNTER — ON CAMPUS - OUTPATIENT (OUTPATIENT)
Dept: URBAN - METROPOLITAN AREA HOSPITAL 77 | Facility: HOSPITAL | Age: 36
End: 2021-11-29
Payer: COMMERCIAL

## 2021-11-29 ENCOUNTER — ANESTHESIA (OUTPATIENT)
Dept: GASTROENTEROLOGY | Facility: HOSPITAL | Age: 36
End: 2021-11-29

## 2021-11-29 VITALS
OXYGEN SATURATION: 99 % | WEIGHT: 136.24 LBS | DIASTOLIC BLOOD PRESSURE: 54 MMHG | RESPIRATION RATE: 16 BRPM | HEIGHT: 63 IN | SYSTOLIC BLOOD PRESSURE: 102 MMHG | HEART RATE: 63 BPM | BODY MASS INDEX: 24.14 KG/M2 | TEMPERATURE: 98.7 F

## 2021-11-29 DIAGNOSIS — K76.6 PORTAL HYPERTENSION: ICD-10-CM

## 2021-11-29 DIAGNOSIS — I85.00 ESOPHAGEAL VARICES WITHOUT BLEEDING: ICD-10-CM

## 2021-11-29 LAB — GLUCOSE BLDC GLUCOMTR-MCNC: 90 MG/DL (ref 70–105)

## 2021-11-29 PROCEDURE — 82962 GLUCOSE BLOOD TEST: CPT

## 2021-11-29 PROCEDURE — 43244 EGD VARICES LIGATION: CPT | Performed by: INTERNAL MEDICINE

## 2021-11-29 PROCEDURE — 25010000002 PROPOFOL 10 MG/ML EMULSION: Performed by: ANESTHESIOLOGY

## 2021-11-29 RX ORDER — SODIUM CHLORIDE 0.9 % (FLUSH) 0.9 %
3-10 SYRINGE (ML) INJECTION AS NEEDED
Status: DISCONTINUED | OUTPATIENT
Start: 2021-11-29 | End: 2021-11-29 | Stop reason: HOSPADM

## 2021-11-29 RX ORDER — LIDOCAINE HYDROCHLORIDE 20 MG/ML
INJECTION, SOLUTION EPIDURAL; INFILTRATION; INTRACAUDAL; PERINEURAL AS NEEDED
Status: DISCONTINUED | OUTPATIENT
Start: 2021-11-29 | End: 2021-11-29 | Stop reason: SURG

## 2021-11-29 RX ORDER — SODIUM CHLORIDE 0.9 % (FLUSH) 0.9 %
3 SYRINGE (ML) INJECTION EVERY 12 HOURS SCHEDULED
Status: DISCONTINUED | OUTPATIENT
Start: 2021-11-29 | End: 2021-11-29 | Stop reason: HOSPADM

## 2021-11-29 RX ORDER — SODIUM CHLORIDE 0.9 % (FLUSH) 0.9 %
10 SYRINGE (ML) INJECTION AS NEEDED
Status: DISCONTINUED | OUTPATIENT
Start: 2021-11-29 | End: 2021-11-29 | Stop reason: HOSPADM

## 2021-11-29 RX ORDER — SODIUM CHLORIDE 9 MG/ML
30 INJECTION, SOLUTION INTRAVENOUS CONTINUOUS PRN
Status: DISCONTINUED | OUTPATIENT
Start: 2021-11-29 | End: 2021-11-29 | Stop reason: HOSPADM

## 2021-11-29 RX ORDER — ONDANSETRON 2 MG/ML
4 INJECTION INTRAMUSCULAR; INTRAVENOUS ONCE AS NEEDED
Status: DISCONTINUED | OUTPATIENT
Start: 2021-11-29 | End: 2021-11-29 | Stop reason: HOSPADM

## 2021-11-29 RX ORDER — PROPOFOL 10 MG/ML
VIAL (ML) INTRAVENOUS AS NEEDED
Status: DISCONTINUED | OUTPATIENT
Start: 2021-11-29 | End: 2021-11-29 | Stop reason: SURG

## 2021-11-29 RX ORDER — SODIUM CHLORIDE 9 MG/ML
9 INJECTION, SOLUTION INTRAVENOUS CONTINUOUS PRN
Status: DISCONTINUED | OUTPATIENT
Start: 2021-11-29 | End: 2021-11-29 | Stop reason: HOSPADM

## 2021-11-29 RX ORDER — SODIUM CHLORIDE 0.9 % (FLUSH) 0.9 %
10 SYRINGE (ML) INJECTION EVERY 12 HOURS SCHEDULED
Status: DISCONTINUED | OUTPATIENT
Start: 2021-11-29 | End: 2021-11-29 | Stop reason: HOSPADM

## 2021-11-29 RX ADMIN — SODIUM CHLORIDE 9 ML/HR: 9 INJECTION, SOLUTION INTRAVENOUS at 13:21

## 2021-11-29 RX ADMIN — PROPOFOL 100 MG: 10 INJECTION, EMULSION INTRAVENOUS at 13:43

## 2021-11-29 RX ADMIN — PROPOFOL 100 MG: 10 INJECTION, EMULSION INTRAVENOUS at 13:39

## 2021-11-29 RX ADMIN — LIDOCAINE HYDROCHLORIDE 100 MG: 20 INJECTION, SOLUTION EPIDURAL; INFILTRATION; INTRACAUDAL; PERINEURAL at 13:39

## 2021-11-29 NOTE — ANESTHESIA PREPROCEDURE EVALUATION
Anesthesia Evaluation     Patient summary reviewed and Nursing notes reviewed   no history of anesthetic complications:  NPO Solid Status: > 8 hours  NPO Liquid Status: > 8 hours           Airway   Dental      Pulmonary    (+) a smoker Former,   Cardiovascular     ECG reviewed  Patient on routine beta blocker    (+) CHF Diastolic >=55%,       Neuro/Psych  GI/Hepatic/Renal/Endo    (+)  GERD,  hepatitis C, liver disease, renal disease CRI,     Musculoskeletal     Abdominal    Substance History      OB/GYN          Other   blood dyscrasia anemia thrombocytopenia,     ROS/Med Hx Other: Bleeding esophageal varices, hyperglycemia, hypocalcemia, coagulopathy, high bilirubin, low protein/albumin, edema, abd distention, hypotension, h/o drug abuse, h/o gestational DM    Echocardiogram Findings    Left Ventricle Calculated left ventricular EF = 58% Estimated left ventricular EF was in disagreement with the calculated left ventricular EF. Left ventricular ejection fraction appears to be greater than 70%. Left ventricular systolic function is normal.   Septal wall motion is normal. Normal left ventricular cavity size and wall thickness noted. All left ventricular wall segments contract normally. Left ventricular diastolic function was normal.  Right Ventricle Normal right ventricular cavity size, wall thickness, systolic function and septal motion noted.  Left Atrium Normal left atrial size and volume noted.  Right Atrium Normal right atrial cavity size noted.  Aortic Valve The aortic valve is grossly normal in structure. The aortic valve appears trileaflet. No significant aortic valve regurgitation is present. No hemodynamically significant aortic valve stenosis is present.  Mitral Valve The mitral valve is grossly normal in structure. Trace to mild mitral valve regurgitation is present with a posteriorly-directed jet noted. No significant mitral valve stenosis is present.  Tricuspid Valve Trace to mild tricuspid valve  regurgitation is present. Estimated right ventricular systolic pressure from tricuspid regurgitation is normal (<35 mmHg). No evidence of pulmonary hypertension is present. No evidence of significant tricuspid valve stenosis is present.  Pulmonic Valve The pulmonic valve is not well visualized. The pulmonic valve is grossly normal in structure.  Greater Vessels No dilation of the aortic root is present. No dilation of the sinuses of Valsalva is present.  Pericardium There is no evidence of pericardial effusion. .    PSH  ENDOSCOPY HERNIA REPAIR  ESOPHAGEAL VARICES LIGATION ENDOSCOPY  ENDOSCOPY                   Anesthesia Plan    ASA 4     MAC   (Patient identified; pre-operative vital signs, all relevant labs/studies, complete medical/surgical/anesthetic history, full medication list, full allergy list, and NPO status obtained/reviewed; physical assessment performed; anesthetic options, side effects, potential complications, risks, and benefits discussed; questions answered; written anesthesia consent obtained; patient cleared for procedure; anesthesia machine and equipment checked and functioning)    Anesthetic plan, all risks, benefits, and alternatives have been provided, discussed and informed consent has been obtained with: patient.

## 2021-11-29 NOTE — ANESTHESIA POSTPROCEDURE EVALUATION
Patient: Alondra WEBER Tevis    Procedure Summary     Date: 11/29/21 Room / Location: [facility] ENDOSCOPY 3 / [facility] ENDOSCOPY    Anesthesia Start: 1335 Anesthesia Stop: 1350    Procedure: ESOPHAGOGASTRODUODENOSCOPY (N/A ) Diagnosis:       BOV (bleeding esophageal varices) (HCC)      (BOV (bleeding esophageal varices) (HCC) [I85.01])    Surgeons: Malick Delgado MD Provider: Silva Nelson MD    Anesthesia Type: MAC ASA Status: 4          Anesthesia Type: MAC    Vitals  Vitals Value Taken Time   BP 93/57 11/29/21 1354   Temp     Pulse 72 11/29/21 1354   Resp 16 11/29/21 1350   SpO2 99 % 11/29/21 1354   Vitals shown include unvalidated device data.        Post Anesthesia Care and Evaluation    Patient location during evaluation: PACU  Patient participation: complete - patient participated  Level of consciousness: awake  Pain management: adequate  Airway patency: patent  Anesthetic complications: No anesthetic complications  PONV Status: controlled  Cardiovascular status: acceptable  Respiratory status: acceptable  Hydration status: acceptable

## 2021-12-09 ENCOUNTER — OFFICE (OUTPATIENT)
Dept: URBAN - METROPOLITAN AREA CLINIC 64 | Facility: CLINIC | Age: 36
End: 2021-12-09
Payer: COMMERCIAL

## 2021-12-09 VITALS
DIASTOLIC BLOOD PRESSURE: 73 MMHG | WEIGHT: 137 LBS | HEART RATE: 68 BPM | HEIGHT: 63 IN | SYSTOLIC BLOOD PRESSURE: 105 MMHG

## 2021-12-09 DIAGNOSIS — B18.2 CHRONIC VIRAL HEPATITIS C: ICD-10-CM

## 2021-12-09 PROCEDURE — 99214 OFFICE O/P EST MOD 30 MIN: CPT | Performed by: NURSE PRACTITIONER

## 2021-12-09 RX ORDER — SPIRONOLACTONE 100 MG/1
200 TABLET, FILM COATED ORAL
Qty: 180 | Refills: 4 | Status: ACTIVE
Start: 2021-12-09

## 2021-12-09 RX ORDER — LACTULOSE 10 G/15ML
600 SOLUTION ORAL; RECTAL
Qty: 1800 | Refills: 11 | Status: ACTIVE
Start: 2021-12-09

## 2021-12-09 RX ORDER — RIFAXIMIN 550 MG/1
1100 TABLET ORAL
Qty: 180 | Refills: 0 | Status: ACTIVE
Start: 2021-12-09

## 2022-11-07 ENCOUNTER — HOSPITAL ENCOUNTER (EMERGENCY)
Facility: HOSPITAL | Age: 37
Discharge: HOME | End: 2022-11-07
Attending: EMERGENCY MEDICINE | Admitting: EMERGENCY MEDICINE

## 2022-11-07 ENCOUNTER — APPOINTMENT (OUTPATIENT)
Dept: CT IMAGING | Facility: HOSPITAL | Age: 37
End: 2022-11-07

## 2022-11-07 VITALS
TEMPERATURE: 98.5 F | DIASTOLIC BLOOD PRESSURE: 68 MMHG | SYSTOLIC BLOOD PRESSURE: 100 MMHG | OXYGEN SATURATION: 100 % | HEIGHT: 63 IN | RESPIRATION RATE: 18 BRPM | HEART RATE: 68 BPM | WEIGHT: 125.88 LBS | BODY MASS INDEX: 22.3 KG/M2

## 2022-11-07 DIAGNOSIS — R40.4 TRANSIENT ALTERATION OF AWARENESS: Primary | ICD-10-CM

## 2022-11-07 LAB
ALBUMIN SERPL-MCNC: 3.9 G/DL (ref 3.5–5.2)
ALBUMIN/GLOB SERPL: 1.2 G/DL
ALP SERPL-CCNC: 125 U/L (ref 39–117)
ALT SERPL W P-5'-P-CCNC: 28 U/L (ref 1–33)
AMMONIA BLD-SCNC: 51 UMOL/L (ref 11–51)
AMPHET+METHAMPHET UR QL: NEGATIVE
ANION GAP SERPL CALCULATED.3IONS-SCNC: 10 MMOL/L (ref 5–15)
AST SERPL-CCNC: 33 U/L (ref 1–32)
B-HCG UR QL: NEGATIVE
BARBITURATES UR QL SCN: NEGATIVE
BASOPHILS # BLD AUTO: 0.1 10*3/MM3 (ref 0–0.2)
BASOPHILS NFR BLD AUTO: 0.8 % (ref 0–1.5)
BENZODIAZ UR QL SCN: NEGATIVE
BILIRUB SERPL-MCNC: 1.5 MG/DL (ref 0–1.2)
BILIRUB UR QL STRIP: NEGATIVE
BUN SERPL-MCNC: 10 MG/DL (ref 6–20)
BUN/CREAT SERPL: 12.2 (ref 7–25)
CALCIUM SPEC-SCNC: 9.3 MG/DL (ref 8.6–10.5)
CANNABINOIDS SERPL QL: POSITIVE
CHLORIDE SERPL-SCNC: 103 MMOL/L (ref 98–107)
CLARITY UR: CLEAR
CO2 SERPL-SCNC: 26 MMOL/L (ref 22–29)
COCAINE UR QL: NEGATIVE
COLOR UR: YELLOW
CREAT SERPL-MCNC: 0.82 MG/DL (ref 0.57–1)
DEPRECATED RDW RBC AUTO: 46.8 FL (ref 37–54)
EGFRCR SERPLBLD CKD-EPI 2021: 95.8 ML/MIN/1.73
EOSINOPHIL # BLD AUTO: 0.3 10*3/MM3 (ref 0–0.4)
EOSINOPHIL NFR BLD AUTO: 4 % (ref 0.3–6.2)
ERYTHROCYTE [DISTWIDTH] IN BLOOD BY AUTOMATED COUNT: 13.9 % (ref 12.3–15.4)
ETHANOL UR QL: <0.01 %
GLOBULIN UR ELPH-MCNC: 3.2 GM/DL
GLUCOSE SERPL-MCNC: 98 MG/DL (ref 65–99)
GLUCOSE UR STRIP-MCNC: NEGATIVE MG/DL
HCT VFR BLD AUTO: 36.4 % (ref 34–46.6)
HGB BLD-MCNC: 12.5 G/DL (ref 12–15.9)
HGB UR QL STRIP.AUTO: NEGATIVE
KETONES UR QL STRIP: NEGATIVE
LEUKOCYTE ESTERASE UR QL STRIP.AUTO: NEGATIVE
LYMPHOCYTES # BLD AUTO: 1.7 10*3/MM3 (ref 0.7–3.1)
LYMPHOCYTES NFR BLD AUTO: 26.9 % (ref 19.6–45.3)
MCH RBC QN AUTO: 31.9 PG (ref 26.6–33)
MCHC RBC AUTO-ENTMCNC: 34.3 G/DL (ref 31.5–35.7)
MCV RBC AUTO: 93 FL (ref 79–97)
METHADONE UR QL SCN: NEGATIVE
MONOCYTES # BLD AUTO: 0.7 10*3/MM3 (ref 0.1–0.9)
MONOCYTES NFR BLD AUTO: 11.6 % (ref 5–12)
NEUTROPHILS NFR BLD AUTO: 3.6 10*3/MM3 (ref 1.7–7)
NEUTROPHILS NFR BLD AUTO: 56.7 % (ref 42.7–76)
NITRITE UR QL STRIP: NEGATIVE
NRBC BLD AUTO-RTO: 0.1 /100 WBC (ref 0–0.2)
OPIATES UR QL: NEGATIVE
OXYCODONE UR QL SCN: NEGATIVE
PH UR STRIP.AUTO: 6.5 [PH] (ref 5–8)
PLATELET # BLD AUTO: 84 10*3/MM3 (ref 140–450)
PMV BLD AUTO: 8.5 FL (ref 6–12)
POTASSIUM SERPL-SCNC: 3.7 MMOL/L (ref 3.5–5.2)
PROT SERPL-MCNC: 7.1 G/DL (ref 6–8.5)
PROT UR QL STRIP: NEGATIVE
RBC # BLD AUTO: 3.92 10*6/MM3 (ref 3.77–5.28)
SODIUM SERPL-SCNC: 139 MMOL/L (ref 136–145)
SP GR UR STRIP: 1.01 (ref 1–1.03)
UROBILINOGEN UR QL STRIP: ABNORMAL
WBC NRBC COR # BLD: 6.4 10*3/MM3 (ref 3.4–10.8)

## 2022-11-07 PROCEDURE — 82077 ASSAY SPEC XCP UR&BREATH IA: CPT | Performed by: NURSE PRACTITIONER

## 2022-11-07 PROCEDURE — 70450 CT HEAD/BRAIN W/O DYE: CPT

## 2022-11-07 PROCEDURE — 80307 DRUG TEST PRSMV CHEM ANLYZR: CPT | Performed by: NURSE PRACTITIONER

## 2022-11-07 PROCEDURE — 85025 COMPLETE CBC W/AUTO DIFF WBC: CPT | Performed by: NURSE PRACTITIONER

## 2022-11-07 PROCEDURE — 93005 ELECTROCARDIOGRAM TRACING: CPT

## 2022-11-07 PROCEDURE — 81003 URINALYSIS AUTO W/O SCOPE: CPT | Performed by: NURSE PRACTITIONER

## 2022-11-07 PROCEDURE — 81025 URINE PREGNANCY TEST: CPT | Performed by: NURSE PRACTITIONER

## 2022-11-07 PROCEDURE — 80053 COMPREHEN METABOLIC PANEL: CPT | Performed by: NURSE PRACTITIONER

## 2022-11-07 PROCEDURE — 93005 ELECTROCARDIOGRAM TRACING: CPT | Performed by: EMERGENCY MEDICINE

## 2022-11-07 PROCEDURE — 99284 EMERGENCY DEPT VISIT MOD MDM: CPT

## 2022-11-07 PROCEDURE — 82140 ASSAY OF AMMONIA: CPT | Performed by: NURSE PRACTITIONER

## 2022-11-07 RX ORDER — SODIUM CHLORIDE 0.9 % (FLUSH) 0.9 %
10 SYRINGE (ML) INJECTION AS NEEDED
Status: DISCONTINUED | OUTPATIENT
Start: 2022-11-07 | End: 2022-11-07 | Stop reason: HOSPADM

## 2022-11-07 NOTE — ED NOTES
"Pt reports increased instances of dizziness and \"spacing out\", feels forgetful, partner states it's like when his son has absence seizures and that she's just been \"off\" lately  "

## 2022-11-07 NOTE — ED PROVIDER NOTES
"Subjective   History of Present Illness  Patient presents with:  Altered Mental Status    Irene Henriquez MD   Patient's last menstrual period was 10/07/2022.    Is a 37-year-old female with a history of hepatitis C, esophageal varices, renal insufficiency, who presents the ED with complaint of \"confusion\", for the past week.  She reports that the week this week she is had periods of \"fogginess\" and confusion.  Her significant other at bedside reports he is concerned she is having absent seizures, he reports that on 3 separate occasions tonight she would stare off into space, and have facial twitching.  Reports these episodes lasted approximate 3 minutes, and afterwards she seemed \"lost\".  No generalized convulsion noted no bowel or bladder incontinence.  No new nausea, vomiting or diarrhea.  Patient reports she is nauseated most of the time but this not new for her.  She denies chest pain or shortness of breath.  Denies hematemesis or coffee emesis.  No blood in the stools.          Review of Systems   Constitutional: Negative for chills and fever.   Respiratory: Negative for chest tightness and shortness of breath.    Cardiovascular: Negative for chest pain and palpitations.   Gastrointestinal: Negative for abdominal pain, blood in stool, diarrhea and vomiting.   Genitourinary: Negative for difficulty urinating.   Musculoskeletal: Negative for back pain and neck pain.   Skin: Negative for color change and rash.   Neurological: Positive for seizures. Negative for dizziness, tremors, syncope, facial asymmetry, speech difficulty, weakness, light-headedness, numbness and headaches.        \"facial twitching\"   Psychiatric/Behavioral: Positive for confusion.       Past Medical History:   Diagnosis Date   • Esophageal varices (HCC)    • GERD (gastroesophageal reflux disease)    • Hepatitis C    • Hypotension    • Renal insufficiency        No Known Allergies    Past Surgical History:   Procedure Laterality Date "   • ENDOSCOPY N/A 8/10/2021    Procedure: ESOPHAGOGASTRODUODENOSCOPY AT BEDSIDE with banding x4;  Surgeon: Clark Ochoa MD;  Location: [facility] ENDOSCOPY;  Service: Gastroenterology;  Laterality: N/A;  post: esophageal varices with banding x4   • ENDOSCOPY N/A 10/3/2021    Procedure: ESOPHAGOGASTRODUODENOSCOPY with esophageal variceal banding ligation;  Surgeon: Tha Saleh MD;  Location: [facility] ENDOSCOPY;  Service: Gastroenterology;  Laterality: N/A;  Post Op: esophageal variceal bleed   • ENDOSCOPY N/A 10/29/2021    Procedure: ESOPHAGOGASTRODUODENOSCOPY with banding x 4;  Surgeon: Malick Delgado MD;  Location: [facility] ENDOSCOPY;  Service: Gastroenterology;  Laterality: N/A;  Post op: Portal hypertensive gastropathy   • ENDOSCOPY N/A 11/29/2021    Procedure: ESOPHAGOGASTRODUODENOSCOPY;  Surgeon: Malick Delgado MD;  Location: [facility] ENDOSCOPY;  Service: Gastroenterology;  Laterality: N/A;  portal hypertension gastropathy, esophAGEAL VARICES, post banding scarring   • ESOPHAGEAL VARICES LIGATION     • HERNIA REPAIR         Family History   Problem Relation Age of Onset   • Cancer Mother    • Diabetes Father    • Heart disease Paternal Grandfather        Social History     Socioeconomic History   • Marital status: Single   Tobacco Use   • Smoking status: Former     Packs/day: 0.00     Types: Cigarettes   • Smokeless tobacco: Never   Vaping Use   • Vaping Use: Never used   Substance and Sexual Activity   • Alcohol use: Not Currently   • Drug use: Not Currently   • Sexual activity: Defer           Objective   Physical Exam  Vitals and nursing note reviewed.   Constitutional:       Appearance: She is well-developed. She is not ill-appearing or toxic-appearing.   HENT:      Head: Normocephalic and atraumatic.      Mouth/Throat:      Mouth: Mucous membranes are moist.      Pharynx: Oropharynx is clear.   Eyes:      Extraocular Movements: Extraocular movements intact.      Pupils: Pupils are  equal, round, and reactive to light.   Neck:      Meningeal: Brudzinski's sign absent.   Cardiovascular:      Rate and Rhythm: Normal rate and regular rhythm.      Heart sounds: No murmur heard.    No friction rub. No gallop.   Pulmonary:      Effort: Pulmonary effort is normal.      Breath sounds: Normal breath sounds.   Abdominal:      General: Bowel sounds are normal.      Palpations: Abdomen is soft.   Musculoskeletal:         General: Normal range of motion.      Cervical back: Normal range of motion and neck supple. No rigidity.   Lymphadenopathy:      Cervical: No cervical adenopathy.   Skin:     General: Skin is warm and dry.      Capillary Refill: Capillary refill takes less than 2 seconds.   Neurological:      Mental Status: She is alert and oriented to person, place, and time.      GCS: GCS eye subscore is 4. GCS verbal subscore is 5. GCS motor subscore is 6.      Cranial Nerves: No dysarthria or facial asymmetry.   Psychiatric:         Mood and Affect: Mood normal.         Behavior: Behavior normal.         Procedures           ED Course  ED Course as of 11/07/22 0550   Mon Nov 07, 2022   0439 LFTs mildly elevated, but have been elevated in the past. [LB]      ED Course User Index  [LB] Joann Ba APRN                                     EKG interpreted per ED physician, viewed by me.  Our findings are: Sinus rhythm rate of 63.  Compared to previous 8/11/2021.          MDM  Number of Diagnoses or Management Options  Transient alteration of awareness  Diagnosis management comments: Results for orders placed or performed during the hospital encounter of 11/07/22  -Comprehensive Metabolic Panel:   Specimen: Blood       Result                      Value             Ref Range           Glucose                     98                65 - 99 mg/dL       BUN                         10                6 - 20 mg/dL        Creatinine                  0.82              0.57 - 1.00 *       Sodium                       139               136 - 145 mm*       Potassium                   3.7               3.5 - 5.2 mm*       Chloride                    103               98 - 107 mmo*       CO2                         26.0              22.0 - 29.0 *       Calcium                     9.3               8.6 - 10.5 m*       Total Protein               7.1               6.0 - 8.5 g/*       Albumin                     3.90              3.50 - 5.20 *       ALT (SGPT)                  28                1 - 33 U/L          AST (SGOT)                  33 (H)            1 - 32 U/L          Alkaline Phosphatase        125 (H)           39 - 117 U/L        Total Bilirubin             1.5 (H)           0.0 - 1.2 mg*       Globulin                    3.2               gm/dL               A/G Ratio                   1.2               g/dL                BUN/Creatinine Ratio        12.2              7.0 - 25.0          Anion Gap                   10.0              5.0 - 15.0 m*       eGFR                        95.8              >60.0 mL/min*  -Pregnancy, Urine - Urine, Clean Catch:   Specimen: Urine, Clean Catch       Result                      Value             Ref Range           HCG, Urine QL               Negative          Negative       -Ammonia:   Specimen: Blood       Result                      Value             Ref Range           Ammonia                     51                11 - 51 umol*  -Ethanol:   Specimen: Blood       Result                      Value             Ref Range           Ethanol %                   <0.010            %              -Urine Drug Screen - Urine, Clean Catch:   Specimen: Urine, Clean Catch       Result                      Value             Ref Range           Amphet/Methamphet, Scr*     Negative          Negative            Barbiturates Screen, U*     Negative          Negative            Benzodiazepine Screen,*     Negative          Negative            Cocaine Screen, Urine       Negative           Negative            Opiate Screen               Negative          Negative            THC, Screen, Urine          Positive (A)      Negative            Methadone Screen, Urine     Negative          Negative            Oxycodone Screen, Urine     Negative          Negative       -Urinalysis With Culture If Indicated - Urine, Clean Catch:   Specimen: Urine, Clean Catch       Result                      Value             Ref Range           Color, UA                   Yellow            Yellow, Straw       Appearance, UA              Clear             Clear               pH, UA                      6.5               5.0 - 8.0           Specific [place], UA        1.009             1.005 - 1.030       Glucose, UA                 Negative          Negative            Ketones, UA                 Negative          Negative            Bilirubin, UA               Negative          Negative            Blood, UA                   Negative          Negative            Protein, UA                 Negative          Negative            Leuk Esterase, UA           Negative          Negative            Nitrite, UA                 Negative          Negative            Urobilinogen, UA                              0.2 - 1.0 E.*   2.0 E.U./dL (A)  -CBC Auto Differential:   Specimen: Blood       Result                      Value             Ref Range           WBC                         6.40              3.40 - 10.80*       RBC                         3.92              3.77 - 5.28 *       Hemoglobin                  12.5              12.0 - 15.9 *       Hematocrit                  36.4              34.0 - 46.6 %       MCV                         93.0              79.0 - 97.0 *       MCH                         31.9              26.6 - 33.0 *       MCHC                        34.3              31.5 - 35.7 *       RDW                         13.9              12.3 - 15.4 %       RDW-SD                      46.8              37.0 - 54.0  *       MPV                         8.5               6.0 - 12.0 fL       Platelets                   84 (L)            140 - 450 10*       Neutrophil %                56.7              42.7 - 76.0 %       Lymphocyte %                26.9              19.6 - 45.3 %       Monocyte %                  11.6              5.0 - 12.0 %        Eosinophil %                4.0               0.3 - 6.2 %         Basophil %                  0.8               0.0 - 1.5 %         Neutrophils, Absolute       3.60              1.70 - 7.00 *       Lymphocytes, Absolute       1.70              0.70 - 3.10 *       Monocytes, Absolute         0.70              0.10 - 0.90 *       Eosinophils, Absolute       0.30              0.00 - 0.40 *       Basophils, Absolute         0.10              0.00 - 0.20 *       nRBC                        0.1               0.0 - 0.2 /1*  -ECG 12 Lead Altered Mental Status:        Result                      Value             Ref Range           QT Interval                 431               ms               CT Head Without Contrast   Final Result        1.  No acute intracranial abnormality.                                Electronically signed by:  Emmanuel Beasley DO      11/7/2022 3:05 AM       Talking to the  the 3 episodes were in close succession over 5 minutes.  Differential would include syncope from transient hypotension versus seizure.  Seizure precautions were discussed, patient understands she is not to drive or do anything if she became incapacitated her life would be a risk until she is cleared by her doctor and/or neurologist.  Patient and  understand if she has these symptoms again it would be best for her to return and potentially be admitted for further evaluation.  I did request also that she monitor her blood pressure daily as transient hypotension is in the differential.  Ammonia is normal.  Patient is alert and oriented without any focal deficits with a normal neurological  exam and is comfortable going home.       Amount and/or Complexity of Data Reviewed  Clinical lab tests: reviewed and ordered  Tests in the radiology section of CPT®: ordered and reviewed  Decide to obtain previous medical records or to obtain history from someone other than the patient: yes      Chart review: Reviewed discharge summary 10/31/2021, patient was admitted for hypokalemia, esophageal varices, anemia.  Appropriate PPE worn during patient interactions.   Differentials: Electrolyte imbalance, anemia, seizure, infection    Patient was brought back to the emergency department room for evaluation and placed on appropriate monitoring.  Patient had IV established and blood work obtained.      Note Disclaimer: At [facility], we believe that sharing information builds trust and better relationships. You are receiving this note because you recently visited [facility]. It is possible you will see health information before a provider has talked with you about it. This kind of information can be easy to misunderstand. To help you fully understand what it means for your health, we urge you to discuss this note with your provider.  Note dicatated utilizing Dragon Dictation.     Final diagnoses:   Transient alteration of awareness       ED Disposition  ED Disposition     ED Disposition   Discharge    Condition   Stable    Comment   --             Amisha Boyle MD  [address] [facility] [place] [address] [phone]    Schedule an appointment as soon as possible for a visit       Irene Henriquez MD  [address] [place] [address] [address] [place] [address] [place] [id number] [phone]    Schedule an appointment as soon as possible for a visit            Medication List      Changed    furosemide 20 MG tablet  Commonly known as: LASIX  Take 3 tablets by mouth Daily.  What changed: additional instructions     lactulose 10 GM/15ML solution  Commonly known as: CHRONULAC  Take 45 mL by mouth 3 (Three) Times a Day.  What changed:  additional instructions     midodrine 10 MG tablet  Commonly known as: PROAMATINE  Take 1 tablet by mouth 3 (Three) Times a Day Before Meals.  What changed: additional instructions     nadolol 20 MG tablet  Commonly known as: CORGARD  Take 1 tablet by mouth Daily.  What changed: additional instructions     spironolactone 50 MG tablet  Commonly known as: ALDACTONE  Take 3 tablets by mouth Daily.  What changed: additional instructions             Kvng Harding MD  11/07/22 0551

## 2022-11-10 LAB — QT INTERVAL: 431 MS

## 2022-11-17 ENCOUNTER — TRANSCRIBE ORDERS (OUTPATIENT)
Dept: ADMINISTRATIVE | Facility: HOSPITAL | Age: 37
End: 2022-11-17

## 2022-11-17 DIAGNOSIS — R56.9 SEIZURES: Primary | ICD-10-CM

## 2022-12-12 ENCOUNTER — HOSPITAL ENCOUNTER (OUTPATIENT)
Dept: NEUROLOGY | Facility: HOSPITAL | Age: 37
Discharge: HOME | End: 2022-12-12
Admitting: NURSE PRACTITIONER

## 2022-12-12 DIAGNOSIS — R56.9 SEIZURES: ICD-10-CM

## 2022-12-12 PROCEDURE — 95819 EEG AWAKE AND ASLEEP: CPT

## 2022-12-12 PROCEDURE — 95819 EEG AWAKE AND ASLEEP: CPT | Performed by: PSYCHIATRY & NEUROLOGY

## 2023-01-20 ENCOUNTER — OFFICE VISIT (OUTPATIENT)
Dept: CARDIOLOGY | Facility: CLINIC | Age: 38
End: 2023-01-20
Payer: MEDICAID

## 2023-01-20 VITALS
HEIGHT: 63 IN | OXYGEN SATURATION: 99 % | WEIGHT: 127 LBS | DIASTOLIC BLOOD PRESSURE: 76 MMHG | HEART RATE: 66 BPM | BODY MASS INDEX: 22.5 KG/M2 | SYSTOLIC BLOOD PRESSURE: 120 MMHG

## 2023-01-20 DIAGNOSIS — R55 NEAR SYNCOPE: Primary | ICD-10-CM

## 2023-01-20 PROCEDURE — 99204 OFFICE O/P NEW MOD 45 MIN: CPT | Performed by: INTERNAL MEDICINE

## 2023-01-20 PROCEDURE — 93000 ELECTROCARDIOGRAM COMPLETE: CPT | Performed by: INTERNAL MEDICINE

## 2023-01-20 RX ORDER — FERROUS SULFATE 325(65) MG
325 TABLET ORAL
COMMUNITY

## 2023-01-20 RX ORDER — LEVETIRACETAM 500 MG/1
1000 TABLET, EXTENDED RELEASE ORAL NIGHTLY
COMMUNITY
Start: 2023-01-03

## 2023-01-20 NOTE — PROGRESS NOTES
Cardiology Office Consultation      Encounter Date:  01/20/2023    Patient ID:   Alondra Eduardo is a 37 y.o. female.    Reason For Consultation:  Near syncope    History of Present Illness:  Dear Irene Bell MD    I had the pleasure of seeing Alondra Eduardo today. As you are well aware, this is a 37 y.o. female with no established history of ischemic heart disease.  She does have a history of hepatitis C, cirrhosis, medication induced hypotension, esophageal varices, history of drug abuse, and tobacco abuse.  She presents today for the evaluation of near syncope.    She does report some occasional discomfort under her left breast from time to time.  There is no rhyme or reason as to when it occurs.  It is not exertional related.  It is moderate in intensity.  She was told by her gastroenterologist that likely it is irritation from her liver when it becomes swelled.  She denies any shortness of breath out of character.  She denies any PND orthopnea.    She is reporting some odd seizure type symptoms.  She reports that she had to go to the ER a couple of times because of spacing out and confusion.  She will have some facial twitching that goes along with this.  She has undergone CT scan of the head as well as MRI that it failed to demonstrate any significant pathology that would explain her symptoms.  She additionally has had an EEG performed that does not demonstrate any evidence of epilepsy.  She is supposed to see a neurologist in the coming weeks.  She is also going to have a sleep study in the coming weeks.  She has been having some difficulty with memory as well.    She reports that this started about 2 years ago and has been ongoing since then.  She has been having some dizziness and lightheadedness that is not as intense as the spacing out episodes.    She does have a history of cirrhosis and esophageal varices that stem from a previous history of hepatitis C.  She is undergone therapy for her  hep C.  She reports that she has a history of IV drug abuse but has not used IV drugs in over 8 years.  She continues to smoke cigarettes.  She had been followed by Dr. España however they no longer accept her insurance and she is on the hunt for another gastroenterologist.    She does not have a history of hypertension.  Her blood pressure today in the office is normal however she notes that she did not take her nadolol today.  She is on both nadolol and midodrine.  The nadolol may be contributing to her episodes by inducing bradycardia and hypotension.  We will have her wear an ambulatory ECG monitor to observe for any significant bradycardia/arrhythmia that may be contributing to her symptoms.    Her most recent echocardiogram was performed in August 2021.  Normal left ventricular systolic function with an ejection fraction in excess of 70% was noted.  Normal diastolic function was noted.  And no evidence of pulmonary hypertension was noted.    Assessment & Plan    Impressions:  Near syncope  Episodes of spacing out and confusion  Cirrhosis  Esophageal varices  Hepatitis C status posttreatment  History of IV drug abuse-clean for 8 years  Probable medication induced hypotension    Recommendations:  2-week mobile cardiac telemetry to observe for bradycardia/arrhythmia that may be contributing to her symptoms  Can consider apple watch versus loop recorder if no symptoms occur during the monitoring timeframe  Follow-up in 6 weeks time    My suspicion is that at least part of her symptoms could be from medication induced bradycardia and hypotension from the nadolol.  Although the midodrine may help, her emergency room blood pressures were still subone 100 systolic.      There are no diagnoses linked to this encounter.     Objective:    Vitals:  There were no vitals filed for this visit.  There is no height or weight on file to calculate BMI.    Physical Exam:    General: Alert, cooperative, no distress, appears  stated age  Head:  Normocephalic, atraumatic, mucous membranes moist  Eyes:  Conjunctiva/corneas clear, EOM's intact     Neck:  Supple,  no bruit    Lungs: Clear to auscultation bilaterally, no wheezes rhonchi rales are noted  Chest wall: No tenderness  Heart::  Regular rate and rhythm, S1 and S2 normal, 1/6 holosystolic murmur.  No rub or gallop  Abdomen: Soft, non-tender, nondistended bowel sounds active  Extremities: No cyanosis, clubbing, or edema  Pulses: 2+ and symmetric all extremities  Skin:  No rashes or lesions  Neuro/psych: A&O x3. CN II through XII are grossly intact with appropriate affect    History of Present Illness      Allergies:  No Known Allergies    Medication Review:     Current Outpatient Medications:   •  folic acid (FOLVITE) 1 MG tablet, Take 1 mg by mouth Daily., Disp: , Rfl:   •  furosemide (LASIX) 20 MG tablet, Take 3 tablets by mouth Daily. (Patient taking differently: Take 3 tablets by mouth Daily. Hold DOS), Disp: 30 tablet, Rfl: 0  •  lactulose (CHRONULAC) 10 GM/15ML solution, Take 45 mL by mouth 3 (Three) Times a Day. (Patient taking differently: Take 45 mL by mouth 3 (Three) Times a Day. No longer taking), Disp: 30 mL, Rfl: 0  •  midodrine (PROAMATINE) 10 MG tablet, Take 1 tablet by mouth 3 (Three) Times a Day Before Meals. (Patient taking differently: Take 1 tablet by mouth 3 (Three) Times a Day Before Meals. Take DOS), Disp: 90 tablet, Rfl: 3  •  nadolol (CORGARD) 20 MG tablet, Take 1 tablet by mouth Daily. (Patient taking differently: Take 1 tablet by mouth Daily. Take DOS), Disp: 30 tablet, Rfl: 0  •  ondansetron (ZOFRAN) 4 MG tablet, Take 1 tablet by mouth Every 6 (Six) Hours As Needed for Nausea or Vomiting., Disp: 60 tablet, Rfl: 0  •  pantoprazole (PROTONIX) 40 MG EC tablet, Take 40 mg by mouth Daily., Disp: , Rfl:   •  Potassium 99 MG tablet, Take 1 tablet by mouth Daily., Disp: , Rfl:   •  spironolactone (ALDACTONE) 50 MG tablet, Take 3 tablets by mouth Daily. (Patient  taking differently: Take 3 tablets by mouth Daily. Hold DOS), Disp: 30 tablet, Rfl: 0  •  thiamine (thiamine) 100 MG tablet tablet, Take 1 tablet by mouth Daily., Disp: 30 tablet, Rfl: 0  •  zinc sulfate (ZINCATE) 220 (50 Zn) MG capsule, Take 1 capsule by mouth Daily., Disp: 30 capsule, Rfl: 0    Family History:  Family History   Problem Relation Age of Onset   • Cancer Mother    • Diabetes Father    • Heart disease Paternal Grandfather        Past Medical History:  Past Medical History:   Diagnosis Date   • Esophageal varices (HCC)    • GERD (gastroesophageal reflux disease)    • Hepatitis C    • Hypotension    • Renal insufficiency        Past surgical History:  Past Surgical History:   Procedure Laterality Date   • ENDOSCOPY N/A 8/10/2021    Procedure: ESOPHAGOGASTRODUODENOSCOPY AT BEDSIDE with banding x4;  Surgeon: Clark Ochoa MD;  Location: [facility] ENDOSCOPY;  Service: Gastroenterology;  Laterality: N/A;  post: esophageal varices with banding x4   • ENDOSCOPY N/A 10/3/2021    Procedure: ESOPHAGOGASTRODUODENOSCOPY with esophageal variceal banding ligation;  Surgeon: Tha Saleh MD;  Location: [facility] ENDOSCOPY;  Service: Gastroenterology;  Laterality: N/A;  Post Op: esophageal variceal bleed   • ENDOSCOPY N/A 10/29/2021    Procedure: ESOPHAGOGASTRODUODENOSCOPY with banding x 4;  Surgeon: Malick Delgado MD;  Location: [facility] ENDOSCOPY;  Service: Gastroenterology;  Laterality: N/A;  Post op: Portal hypertensive gastropathy   • ENDOSCOPY N/A 11/29/2021    Procedure: ESOPHAGOGASTRODUODENOSCOPY;  Surgeon: Malick Delgado MD;  Location: [facility] ENDOSCOPY;  Service: Gastroenterology;  Laterality: N/A;  portal hypertension gastropathy, esophAGEAL VARICES, post banding scarring   • ESOPHAGEAL VARICES LIGATION     • HERNIA REPAIR         Social History:  Social History     Socioeconomic History   • Marital status: Single   Tobacco Use   • Smoking status: Former     Packs/day: 0.00     Types:  Cigarettes   • Smokeless tobacco: Never   Vaping Use   • Vaping Use: Never used   Substance and Sexual Activity   • Alcohol use: Not Currently   • Drug use: Not Currently   • Sexual activity: Defer       Review of Systems:  The following systems were reviewed as they relate to the cardiovascular system: Constitutional, Eyes, ENT, Cardiovascular, Respiratory, Gastrointestinal, Integumentary, Neurological, Psychiatric, Hematologic, Endocrine, Musculoskeletal, and Genitourinary. The pertinent cardiovascular findings are reported above with all other cardiovascular points within those systems being negative.    Diagnostic Study Review:     Current Electrocardiogram:    ECG 12 Lead    Date/Time: 1/20/2023 5:37 PM  Performed by: Aaron Philip DO  Authorized by: Aaron Philip DO   Comparison: not compared with previous ECG   Previous ECG: no previous ECG available  Comments: Sinus bradycardia with a ventricular rate of 51 bpm.  Normal QT and QTc intervals.  Normal QRS axis.            Laboratory Data:  Lab Results   Component Value Date    GLUCOSE 98 11/07/2022    BUN 10 11/07/2022    CREATININE 0.82 11/07/2022    EGFRIFNONA 110 10/31/2021    BCR 12.2 11/07/2022    K 3.7 11/07/2022    CO2 26.0 11/07/2022    CALCIUM 9.3 11/07/2022    PROTENTOTREF 5.0 (L) 08/10/2021    ALBUMIN 3.90 11/07/2022    LABIL2 1.5 08/10/2021    AST 33 (H) 11/07/2022    ALT 28 11/07/2022     Lab Results   Component Value Date    GLUCOSE 98 11/07/2022    CALCIUM 9.3 11/07/2022     11/07/2022    K 3.7 11/07/2022    CO2 26.0 11/07/2022     11/07/2022    BUN 10 11/07/2022    CREATININE 0.82 11/07/2022    EGFRIFNONA 110 10/31/2021    BCR 12.2 11/07/2022    ANIONGAP 10.0 11/07/2022     Lab Results   Component Value Date    WBC 6.40 11/07/2022    HGB 12.5 11/07/2022    HCT 36.4 11/07/2022    MCV 93.0 11/07/2022    PLT 84 (L) 11/07/2022     Lab Results   Component Value Date    CHOL 76 08/21/2021    TRIG 60  08/21/2021    HDL 25 (L) 08/21/2021    LDL 37 08/21/2021     Lab Results   Component Value Date    HGBA1C 5.1 03/26/2019     Lab Results   Component Value Date    INR 1.55 (H) 10/31/2021    INR 1.44 (H) 10/30/2021    INR 1.55 (H) 10/29/2021    PROTIME 16.7 (H) 10/31/2021    PROTIME 15.6 (H) 10/30/2021    PROTIME 16.7 (H) 10/29/2021       Most Recent Echo:  Results for orders placed during the hospital encounter of 08/09/21    Adult Transthoracic Echo Complete w/ Color, Spectral and Contrast if Necessary Per Protocol    Interpretation Summary  · Estimated left ventricular EF was in disagreement with the calculated left ventricular EF. Left ventricular ejection fraction appears to be greater than 70%. Left ventricular systolic function is normal.  · Left ventricular diastolic function was normal.  · Estimated right ventricular systolic pressure from tricuspid regurgitation is normal (<35 mmHg).       Most Recent Stress Test:       Most Recent Cardiac Catheterization:   No results found for this or any previous visit.       NOTE: The following portions of the patient's note were reviewed, confirmed and/or updated this visit as appropriate: History of present illness/Interval history, physical examination, assessment & plan, allergies, current medications, past family history, past medical history, past social history, past surgical history and problem list.

## 2023-02-13 ENCOUNTER — HOSPITAL ENCOUNTER (OUTPATIENT)
Dept: RESPIRATORY THERAPY | Facility: HOSPITAL | Age: 38
Discharge: HOME | End: 2023-02-13
Payer: MEDICAID

## 2023-02-13 DIAGNOSIS — R55 NEAR SYNCOPE: ICD-10-CM

## 2023-03-01 PROCEDURE — 93228 REMOTE 30 DAY ECG REV/REPORT: CPT | Performed by: INTERNAL MEDICINE

## 2023-06-07 ENCOUNTER — OFFICE VISIT (OUTPATIENT)
Dept: CARDIOLOGY | Facility: CLINIC | Age: 38
End: 2023-06-07
Payer: MEDICAID

## 2023-06-07 VITALS
OXYGEN SATURATION: 98 % | HEIGHT: 63 IN | HEART RATE: 74 BPM | SYSTOLIC BLOOD PRESSURE: 96 MMHG | WEIGHT: 118 LBS | BODY MASS INDEX: 20.91 KG/M2 | DIASTOLIC BLOOD PRESSURE: 68 MMHG

## 2023-06-07 DIAGNOSIS — I95.9 HYPOTENSION, UNSPECIFIED HYPOTENSION TYPE: Primary | ICD-10-CM

## 2023-06-07 DIAGNOSIS — R55 SYNCOPE AND COLLAPSE: ICD-10-CM

## 2023-06-07 PROCEDURE — 99214 OFFICE O/P EST MOD 30 MIN: CPT | Performed by: INTERNAL MEDICINE

## 2023-06-07 PROCEDURE — 1159F MED LIST DOCD IN RCRD: CPT | Performed by: INTERNAL MEDICINE

## 2023-06-07 PROCEDURE — 1160F RVW MEDS BY RX/DR IN RCRD: CPT | Performed by: INTERNAL MEDICINE

## 2023-06-07 RX ORDER — MIDODRINE HYDROCHLORIDE 5 MG/1
5 TABLET ORAL
Qty: 270 TABLET | Refills: 3 | Status: SHIPPED | OUTPATIENT
Start: 2023-06-07

## 2023-06-07 RX ORDER — MIDODRINE HYDROCHLORIDE 10 MG/1
10 TABLET ORAL
Qty: 90 TABLET | Refills: 3 | Status: SHIPPED | OUTPATIENT
Start: 2023-06-07

## 2023-06-07 NOTE — PROGRESS NOTES
Cardiology Office Visit      Encounter Date:  2023    PATIENT IDENTIFICATION    Name: Alondra Eduardo  Age: 38 y.o. Sex: female : 1985  MRN: [id number]    Reason For Followup:  Near syncope    Brief Clinical History:  Dear Dr. Henriquez, Irene Begum MD    I had the pleasure of seeing Alondra Eduardo today. As you are well aware, this is a 38 y.o. female with no established history of ischemic heart disease.  She does have a history of hepatitis C, cirrhosis, medication induced hypotension, esophageal varices, history of drug abuse, and tobacco abuse.  She presents today for the evaluation of near syncope.    Interval History:  She denies any chest pain pressure heaviness or tightness.  She denies any shortness of breath.  She denies any PND orthopnea.  She does report dizziness and lightheadedness/orthostasis but reports that this is better since she has been off of nadolol.  She has had no syncopal episodes.    We had suspected that her symptoms were predominantly driven by hypotension.  Since she has been off of the nadolol she reports an improvement in her symptoms although they are still occurring almost daily they are not nearly as profound.    She does have a history of esophageal varices secondary to hepatitis C and cirrhosis and had been on nadolol to help with the varices.  She reports her most recent EGD was performed about a year ago and no evidence of varices were noted.  She was on nadolol at that time however.    She is establishing with a new gastroenterologist because of an insurance issue with her previous one.    She is undergone neurological work-up for the symptoms in the past including an EEG with no epileptiform events noted.    She underwent a mobile cardiac telemetry with symptoms of dizziness and lightheadedness that did not correlate with any arrhythmia or rhythm disturbance for that matter.  Her tracing was essentially unremarkable.    Assessment & Plan    Impressions:  Near  "syncope likely secondary to hypotension and orthostasis  Episodes of spacing out and confusion  Cirrhosis  Esophageal varices  Hepatitis C status posttreatment  History of IV drug abuse-clean for 8 years  Probable medication induced hypotension    Recommendations:  Increase midodrine to 15 mg daily  Drink plenty of fluids  Follow-up as needed.    Diagnoses and all orders for this visit:    1. Hypotension, unspecified hypotension type (Primary)    2. Syncope and collapse    Other orders  -     midodrine (PROAMATINE) 5 MG tablet; Take 1 tablet by mouth 3 (Three) Times a Day Before Meals.  Dispense: 270 tablet; Refill: 3  -     midodrine (PROAMATINE) 10 MG tablet; Take 1 tablet by mouth 3 (Three) Times a Day Before Meals.  Dispense: 90 tablet; Refill: 3          Objective:    Vitals:  Vitals:    06/07/23 1307   BP: 96/68   BP Location: Left arm   Patient Position: Sitting   Pulse: 74   SpO2: 98%   Weight: 53.5 kg (118 lb)   Height: 160 cm (63\")     Body mass index is 20.9 kg/m².      Physical Exam:    General: Alert, cooperative, no distress, appears stated age  Head:  Normocephalic, atraumatic, mucous membranes moist  Eyes:  Conjunctiva/corneas clear, EOM's intact     Neck:  Supple,  no bruit    Lungs: Clear to auscultation bilaterally, no wheezes rhonchi rales are noted  Chest wall: No tenderness  Heart::  Regular rate and rhythm, S1 and S2 normal, no murmur, rub or gallop  Abdomen: Soft, non-tender, nondistended bowel sounds active  Extremities: No cyanosis, clubbing, or edema  Pulses: 2+ and symmetric all extremities  Skin:  No rashes or lesions  Neuro/psych: A&O x3. CN II through XII are grossly intact with appropriate affect      Allergies:  No Known Allergies    Medication Review:     Current Outpatient Medications:     ferrous sulfate 325 (65 FE) MG tablet, Take 1 tablet by mouth Daily With Breakfast., Disp: , Rfl:     folic acid (FOLVITE) 1 MG tablet, Take 1 tablet by mouth Daily., Disp: , Rfl:     lactulose " (CHRONULAC) 10 GM/15ML solution, Take 45 mL by mouth 3 (Three) Times a Day., Disp: 30 mL, Rfl: 0    levETIRAcetam XR (KEPPRA XR) 500 MG 24 hr tablet, Take 2 tablets by mouth Every Night., Disp: , Rfl:     midodrine (PROAMATINE) 10 MG tablet, Take 1 tablet by mouth 3 (Three) Times a Day Before Meals., Disp: 90 tablet, Rfl: 3    spironolactone (ALDACTONE) 50 MG tablet, Take 3 tablets by mouth Daily. (Patient taking differently: Take 1 tablet by mouth 2 (Two) Times a Day.), Disp: 30 tablet, Rfl: 0    thiamine (thiamine) 100 MG tablet tablet, Take 1 tablet by mouth Daily., Disp: 30 tablet, Rfl: 0    zinc sulfate (ZINCATE) 220 (50 Zn) MG capsule, Take 1 capsule by mouth Daily., Disp: 30 capsule, Rfl: 0    midodrine (PROAMATINE) 5 MG tablet, Take 1 tablet by mouth 3 (Three) Times a Day Before Meals., Disp: 270 tablet, Rfl: 3    Family History:  Family History   Problem Relation Age of Onset    Cancer Mother     Diabetes Father     Heart disease Paternal Grandfather        Past Medical History:  Past Medical History:   Diagnosis Date    Esophageal varices     GERD (gastroesophageal reflux disease)     Hepatitis C     Hypotension     Renal insufficiency        Past Surgical History:  Past Surgical History:   Procedure Laterality Date    ENDOSCOPY N/A 8/10/2021    Procedure: ESOPHAGOGASTRODUODENOSCOPY AT BEDSIDE with banding x4;  Surgeon: Clark Ochoa MD;  Location: [facility] ENDOSCOPY;  Service: Gastroenterology;  Laterality: N/A;  post: esophageal varices with banding x4    ENDOSCOPY N/A 10/3/2021    Procedure: ESOPHAGOGASTRODUODENOSCOPY with esophageal variceal banding ligation;  Surgeon: Tha Saleh MD;  Location: [facility] ENDOSCOPY;  Service: Gastroenterology;  Laterality: N/A;  Post Op: esophageal variceal bleed    ENDOSCOPY N/A 10/29/2021    Procedure: ESOPHAGOGASTRODUODENOSCOPY with banding x 4;  Surgeon: Malick Delgado MD;  Location: [facility] ENDOSCOPY;  Service: Gastroenterology;  Laterality: N/A;   Post op: Portal hypertensive gastropathy    ENDOSCOPY N/A 11/29/2021    Procedure: ESOPHAGOGASTRODUODENOSCOPY;  Surgeon: aMlick Delgado MD;  Location: [facility] ENDOSCOPY;  Service: Gastroenterology;  Laterality: N/A;  portal hypertension gastropathy, esophAGEAL VARICES, post banding scarring    ESOPHAGEAL VARICES LIGATION      HERNIA REPAIR         Social History:  Social History     Socioeconomic History    Marital status: Single   Tobacco Use    Smoking status: Every Day     Packs/day: 0.50     Types: Cigarettes    Smokeless tobacco: Never   Vaping Use    Vaping Use: Some days   Substance and Sexual Activity    Alcohol use: Not Currently    Drug use: Not Currently     Types: Heroin, Methamphetamines     Comment: anything- last used 2/2015    Sexual activity: Defer       Review of Systems:  The following systems were reviewed as they relate to the cardiovascular system: Constitutional, Eyes, ENT, Cardiovascular, Respiratory, Gastrointestinal, Integumentary, Neurological, Psychiatric, Hematologic, Endocrine, Musculoskeletal, and Genitourinary. The pertinent cardiovascular findings are reported above with all other cardiovascular points within those systems being negative.    Diagnostic Study Review:     Current Electrocardiogram:  Procedures no new EKG.  EKG dated 1/20/2023 demonstrates sinus rhythm with a ventricular rate of 51 bpm.    Laboratory Data:  Lab Results   Component Value Date    GLUCOSE 98 11/07/2022    BUN 10 11/07/2022    CREATININE 0.82 11/07/2022    EGFRIFNONA 110 10/31/2021    BCR 12.2 11/07/2022    K 3.7 11/07/2022    CO2 26.0 11/07/2022    CALCIUM 9.3 11/07/2022    PROTENTOTREF 5.0 (L) 08/10/2021    ALBUMIN 3.90 11/07/2022    LABIL2 1.5 08/10/2021    AST 33 (H) 11/07/2022    ALT 28 11/07/2022     Lab Results   Component Value Date    GLUCOSE 98 11/07/2022    CALCIUM 9.3 11/07/2022     11/07/2022    K 3.7 11/07/2022    CO2 26.0 11/07/2022     11/07/2022    BUN 10 11/07/2022     CREATININE 0.82 11/07/2022    EGFRIFNONA 110 10/31/2021    BCR 12.2 11/07/2022    ANIONGAP 10.0 11/07/2022     Lab Results   Component Value Date    WBC 6.40 11/07/2022    HGB 12.5 11/07/2022    HCT 36.4 11/07/2022    MCV 93.0 11/07/2022    PLT 84 (L) 11/07/2022     Lab Results   Component Value Date    CHOL 76 08/21/2021    TRIG 60 08/21/2021    HDL 25 (L) 08/21/2021    LDL 37 08/21/2021     Lab Results   Component Value Date    HGBA1C 5.1 03/26/2019     Lab Results   Component Value Date    INR 1.55 (H) 10/31/2021    INR 1.44 (H) 10/30/2021    INR 1.55 (H) 10/29/2021    PROTIME 16.7 (H) 10/31/2021    PROTIME 15.6 (H) 10/30/2021    PROTIME 16.7 (H) 10/29/2021       Most Recent Echo:  Results for orders placed during the hospital encounter of 08/09/21    Adult Transthoracic Echo Complete w/ Color, Spectral and Contrast if Necessary Per Protocol    Interpretation Summary  · Estimated left ventricular EF was in disagreement with the calculated left ventricular EF. Left ventricular ejection fraction appears to be greater than 70%. Left ventricular systolic function is normal.  · Left ventricular diastolic function was normal.  · Estimated right ventricular systolic pressure from tricuspid regurgitation is normal (<35 mmHg).       Most Recent Stress Test:       Most Recent Cardiac Catheterization:   No results found for this or any previous visit.       NOTE: The following portions of the patient's note were reviewed, confirmed and/or updated this visit as appropriate: History of present illness/Interval history, physical examination, assessment & plan, allergies, current medications, past family history, past medical history, past social history, past surgical history and problem list.

## 2023-07-06 PROBLEM — R00.1 BRADYCARDIA: Status: ACTIVE | Noted: 2023-07-06

## 2023-07-07 ENCOUNTER — TELEPHONE (OUTPATIENT)
Dept: CARDIOLOGY | Facility: CLINIC | Age: 38
End: 2023-07-07

## 2023-07-07 NOTE — TELEPHONE ENCOUNTER
Caller: Alondra Eduardo    Relationship: Self    Best call back number: [phone]        PATIENT WAS SEEN IN ER 7.6.23, NORMAL LABS AND EKG, WAS SENT HOME WITH A 30 DAY MONITOR.    PATIENT WAS SEEN FOR FATIGUE, CHEST PAIN, AND LOW HEART RATE.    PATIENT WAS ADVISED TO FOLLOW UP WITH DR. DIPTI TURCIOS, MONITOR WILL END AUGUST 3RD

## 2023-08-01 ENCOUNTER — OFFICE VISIT (OUTPATIENT)
Dept: FAMILY MEDICINE CLINIC | Facility: CLINIC | Age: 38
End: 2023-08-01
Payer: MEDICAID

## 2023-08-01 VITALS
BODY MASS INDEX: 20.13 KG/M2 | DIASTOLIC BLOOD PRESSURE: 83 MMHG | SYSTOLIC BLOOD PRESSURE: 132 MMHG | HEART RATE: 74 BPM | WEIGHT: 113.6 LBS | OXYGEN SATURATION: 97 % | HEIGHT: 63 IN | TEMPERATURE: 97.3 F

## 2023-08-01 DIAGNOSIS — I95.9 HYPOTENSION, UNSPECIFIED HYPOTENSION TYPE: ICD-10-CM

## 2023-08-01 DIAGNOSIS — F41.9 ANXIETY AND DEPRESSION: Primary | ICD-10-CM

## 2023-08-01 DIAGNOSIS — Z72.0 TOBACCO USE: ICD-10-CM

## 2023-08-01 DIAGNOSIS — R14.0 ABDOMINAL DISTENTION: ICD-10-CM

## 2023-08-01 DIAGNOSIS — F32.A ANXIETY AND DEPRESSION: Primary | ICD-10-CM

## 2023-08-01 DIAGNOSIS — K42.9 UMBILICAL HERNIA WITHOUT OBSTRUCTION AND WITHOUT GANGRENE: ICD-10-CM

## 2023-08-01 RX ORDER — ESCITALOPRAM OXALATE 10 MG/1
10 TABLET ORAL DAILY
Qty: 30 TABLET | Refills: 1 | Status: SHIPPED | OUTPATIENT
Start: 2023-08-01

## 2023-08-07 ENCOUNTER — PATIENT ROUNDING (BHMG ONLY) (OUTPATIENT)
Dept: FAMILY MEDICINE CLINIC | Facility: CLINIC | Age: 38
End: 2023-08-07
Payer: MEDICAID

## 2023-08-07 NOTE — PROGRESS NOTES
A ZYB message has been sent to the patient for patient rounding with [facility] [place]

## 2023-08-14 ENCOUNTER — OFFICE VISIT (OUTPATIENT)
Dept: SURGERY | Facility: CLINIC | Age: 38
End: 2023-08-14
Payer: MEDICAID

## 2023-08-14 VITALS
HEIGHT: 63 IN | WEIGHT: 113 LBS | OXYGEN SATURATION: 100 % | DIASTOLIC BLOOD PRESSURE: 68 MMHG | BODY MASS INDEX: 20.02 KG/M2 | SYSTOLIC BLOOD PRESSURE: 106 MMHG | HEART RATE: 60 BPM | TEMPERATURE: 98.2 F

## 2023-08-14 DIAGNOSIS — K42.9 UMBILICAL HERNIA WITHOUT OBSTRUCTION AND WITHOUT GANGRENE: Primary | ICD-10-CM

## 2023-08-14 PROCEDURE — 1160F RVW MEDS BY RX/DR IN RCRD: CPT | Performed by: SURGERY

## 2023-08-14 PROCEDURE — 1159F MED LIST DOCD IN RCRD: CPT | Performed by: SURGERY

## 2023-08-14 PROCEDURE — 99203 OFFICE O/P NEW LOW 30 MIN: CPT | Performed by: SURGERY

## 2023-08-14 NOTE — PROGRESS NOTES
General Surgery History and Physical      Referring Provider: SEGUN Rico    Chief Complaint:    Umbilical hernia    History of Present Illness:    Alondra Eduardo is a 38 y.o. presents for evaluation of umbilical hernia.  She reports it has been present for about 1 year and reduces.  Associated with pain but no nausea or vomiting.  Pain is worse with activity.  She does have a history of hepatitis C secondary to IV drug use in the past.  Also known to have portal hypertension with varices with prior history of ending of esophageal varices.    Past Medical History:   Past Medical History:   Diagnosis Date    Esophageal varices     GERD (gastroesophageal reflux disease)     Hepatitis C     Hypotension     Renal insufficiency     Seizures       Past Surgical History:    Past Surgical History:   Procedure Laterality Date     SECTION  2017    ENDOSCOPY N/A 08/10/2021    Procedure: ESOPHAGOGASTRODUODENOSCOPY AT BEDSIDE with banding x4;  Surgeon: Clark Ochoa MD;  Location: [facility] ENDOSCOPY;  Service: Gastroenterology;  Laterality: N/A;  post: esophageal varices with banding x4    ENDOSCOPY N/A 10/03/2021    Procedure: ESOPHAGOGASTRODUODENOSCOPY with esophageal variceal banding ligation;  Surgeon: Tha Saleh MD;  Location: [facility] ENDOSCOPY;  Service: Gastroenterology;  Laterality: N/A;  Post Op: esophageal variceal bleed    ENDOSCOPY N/A 10/29/2021    Procedure: ESOPHAGOGASTRODUODENOSCOPY with banding x 4;  Surgeon: Malick Delgado MD;  Location: [facility] ENDOSCOPY;  Service: Gastroenterology;  Laterality: N/A;  Post op: Portal hypertensive gastropathy    ENDOSCOPY N/A 2021    Procedure: ESOPHAGOGASTRODUODENOSCOPY;  Surgeon: Malick Delgado MD;  Location: [facility] ENDOSCOPY;  Service: Gastroenterology;  Laterality: N/A;  portal hypertension gastropathy, esophAGEAL VARICES, post banding scarring    ESOPHAGEAL VARICES LIGATION      HERNIA REPAIR       Family History:     Family History   Problem Relation Age of Onset    Cancer Mother     Diabetes Father     Heart disease Paternal Grandfather      Social History:    Social History     Socioeconomic History    Marital status: Single   Tobacco Use    Smoking status: Every Day     Packs/day: 0.50     Years: 10.00     Pack years: 5.00     Types: Cigarettes     Passive exposure: Current    Smokeless tobacco: Never   Vaping Use    Vaping Use: Some days    Substances: Nicotine, THC    Devices: Pre-filled or refillable cartridge   Substance and Sexual Activity    Alcohol use: Not Currently    Drug use: Not Currently     Types: Heroin, Methamphetamines     Comment: anything- last used 2/2015    Sexual activity: Yes     Partners: Male     Birth control/protection: Condom, Other     Allergies:   No Known Allergies    Medications:     Current Outpatient Medications:     Elderberry 500 MG capsule, Take 1 capsule by mouth Daily., Disp: , Rfl:     escitalopram (Lexapro) 10 MG tablet, Take 1 tablet by mouth Daily., Disp: 30 tablet, Rfl: 1    ferrous sulfate 325 (65 FE) MG tablet, Take 1 tablet by mouth Daily With Breakfast., Disp: , Rfl:     folic acid (FOLVITE) 1 MG tablet, Take 1 tablet by mouth Daily., Disp: , Rfl:     lactulose (CHRONULAC) 10 GM/15ML solution, Take 45 mL by mouth 3 (Three) Times a Day., Disp: 30 mL, Rfl: 0    levETIRAcetam XR (KEPPRA XR) 500 MG 24 hr tablet, Take 2 tablets by mouth Every Night., Disp: , Rfl:     midodrine (PROAMATINE) 10 MG tablet, Take 1 tablet by mouth 3 (Three) Times a Day Before Meals., Disp: 90 tablet, Rfl: 3    midodrine (PROAMATINE) 5 MG tablet, Take 1 tablet by mouth 3 (Three) Times a Day Before Meals., Disp: 270 tablet, Rfl: 3    rifAXIMin (XIFAXAN) 200 MG tablet, Take 1 tablet by mouth 3 (Three) Times a Day., Disp: , Rfl:     spironolactone (ALDACTONE) 50 MG tablet, Take 3 tablets by mouth Daily. (Patient taking differently: Take 1 tablet by mouth 2 (Two) Times a Day.), Disp: 30 tablet, Rfl: 0     thiamine (thiamine) 100 MG tablet tablet, Take 1 tablet by mouth Daily., Disp: 30 tablet, Rfl: 0    zinc sulfate (ZINCATE) 220 (50 Zn) MG capsule, Take 1 capsule by mouth Daily., Disp: 30 capsule, Rfl: 0    Radiology/Endoscopy:    EGD 11/29/2021  Impression:  Esophageal varices, post-banding esophageal scars, portal hypertensive gastropathy.   Recommendations:  Continue sobriety from alcohol  Follow-up in the office to discuss medical management of chronic hepatitis C  Referral for liver transplant evaluation after 6 months of sobriety from alcohol  Repeat EGD in 1 year  Continue nadolol     Labs:    Prior most recent labs reviewed    Review of Systems:   As noted above in HPI    Physical Exam:   No acute distress, alert  Nonlabored respirations  Abdomen soft, nontender, nondistended, small subcentimeter reducible umbilical hernia    Assessment and Plan:  Alondra Eduardo is a 38 y.o. with cirrhosis likely secondary to hep C from prior IV drug use, known portal hypertension tension, prior history of banding of esophageal varices with small symptomatic umbilical hernia.    - Discussed with the patient she is high risk for complications and bleeding associated with surgery  - Patient expressed she has follow-up with new gastroenterologist; patient advised to discuss with gastroenterologist if she is too high risk for general anesthesia  - Given history of cirrhosis with abnormal INR, low platelets, portal hypertension she would be high risk for surgery as well as high risk for bleeding  - Would hold off on surgery at this time unless GI feels she is not at prohibitive risk  - Follow-up on an as-needed basis    Adri Garcia MD  General Surgery

## 2023-10-18 DIAGNOSIS — F32.A ANXIETY AND DEPRESSION: ICD-10-CM

## 2023-10-18 DIAGNOSIS — F41.9 ANXIETY AND DEPRESSION: ICD-10-CM

## 2023-10-18 RX ORDER — ESCITALOPRAM OXALATE 10 MG/1
10 TABLET ORAL DAILY
Qty: 30 TABLET | Refills: 0 | Status: SHIPPED | OUTPATIENT
Start: 2023-10-18

## 2023-11-22 ENCOUNTER — OFFICE VISIT (OUTPATIENT)
Dept: FAMILY MEDICINE CLINIC | Facility: CLINIC | Age: 38
End: 2023-11-22
Payer: MEDICAID

## 2023-11-22 VITALS
BODY MASS INDEX: 22.47 KG/M2 | TEMPERATURE: 99.1 F | SYSTOLIC BLOOD PRESSURE: 112 MMHG | OXYGEN SATURATION: 98 % | DIASTOLIC BLOOD PRESSURE: 72 MMHG | HEIGHT: 63 IN | WEIGHT: 126.8 LBS | HEART RATE: 64 BPM

## 2023-11-22 DIAGNOSIS — F32.A ANXIETY AND DEPRESSION: Primary | ICD-10-CM

## 2023-11-22 DIAGNOSIS — Z23 FLU VACCINE NEED: ICD-10-CM

## 2023-11-22 DIAGNOSIS — F41.9 ANXIETY AND DEPRESSION: Primary | ICD-10-CM

## 2023-11-22 RX ORDER — RIFAXIMIN 550 MG/1
550 TABLET ORAL EVERY 12 HOURS SCHEDULED
COMMUNITY
Start: 2023-10-31

## 2023-11-22 RX ORDER — PANTOPRAZOLE SODIUM 40 MG/1
40 TABLET, DELAYED RELEASE ORAL DAILY
COMMUNITY
Start: 2023-08-22

## 2023-11-22 RX ORDER — ESCITALOPRAM OXALATE 20 MG/1
20 TABLET ORAL DAILY
Qty: 90 TABLET | Refills: 1 | Status: SHIPPED | OUTPATIENT
Start: 2023-11-22

## 2023-11-22 RX ORDER — POTASSIUM CHLORIDE 20 MEQ/1
20 TABLET, EXTENDED RELEASE ORAL DAILY
COMMUNITY
Start: 2023-08-23 | End: 2023-12-21

## 2023-11-22 NOTE — PROGRESS NOTES
"Chelita Eduardo is a 38 y.o. female presents for   Chief Complaint   Patient presents with    Anxiety     Follow up on meds  Flu shot OK    Depression       Health Maintenance Due   Topic Date Due    Hepatitis B (1 of 3 - 3-dose series) Never done    URINE MICROALBUMIN  Never done    Pneumococcal Vaccine 0-64 (1 - PCV) Never done    ANNUAL PHYSICAL  Never done    DIABETIC FOOT EXAM  Never done    PAP SMEAR  Never done    HEMOGLOBIN A1C  08/16/2021    DIABETIC EYE EXAM  Never done    COVID-19 Vaccine (2 - 2023-24 season) 09/01/2023       History of Present Illness   Pt present to follow up anxiety. She states she will experience intermittent anxiety at times but states depression is well controlled. Discussed increasing lexapro or adding buspar to address anxiety.  She states anxiety is occurring more often than not , about 4 days a week.  She denies problems or side effects of medication.    Vitals:    11/22/23 0955   BP: 112/72   BP Location: Right arm   Patient Position: Sitting   Cuff Size: Adult   Pulse: 64   Temp: 99.1 °F (37.3 °C)   TempSrc: Tympanic   SpO2: 98%   Weight: 57.5 kg (126 lb 12.8 oz)   Height: 160 cm (62.99\")     Body mass index is 22.47 kg/m².    Current Outpatient Medications on File Prior to Visit   Medication Sig Dispense Refill    Elderberry 500 MG capsule Take 1 capsule by mouth Daily.      ferrous sulfate 325 (65 FE) MG tablet Take 1 tablet by mouth Daily With Breakfast.      folic acid (FOLVITE) 1 MG tablet Take 1 tablet by mouth Daily.      levETIRAcetam XR (KEPPRA XR) 500 MG 24 hr tablet Take 2 tablets by mouth Every Night.      midodrine (PROAMATINE) 10 MG tablet Take 1 tablet by mouth 3 (Three) Times a Day Before Meals. 90 tablet 3    midodrine (PROAMATINE) 5 MG tablet Take 1 tablet by mouth 3 (Three) Times a Day Before Meals. 270 tablet 3    pantoprazole (PROTONIX) 40 MG EC tablet Take 1 tablet by mouth Daily.      potassium chloride (K-DUR,KLOR-CON) 20 MEQ CR tablet Take " 1 tablet by mouth Daily.      thiamine (thiamine) 100 MG tablet tablet Take 1 tablet by mouth Daily. 30 tablet 0    Xifaxan 550 MG tablet Take 1 tablet by mouth Every 12 (Twelve) Hours.      zinc sulfate (ZINCATE) 220 (50 Zn) MG capsule Take 1 capsule by mouth Daily. 30 capsule 0    [DISCONTINUED] escitalopram (LEXAPRO) 10 MG tablet Take 1 tablet by mouth once daily 30 tablet 0    lactulose (CHRONULAC) 10 GM/15ML solution Take 45 mL by mouth 3 (Three) Times a Day. (Patient not taking: Reported on 11/22/2023) 30 mL 0    spironolactone (ALDACTONE) 50 MG tablet Take 3 tablets by mouth Daily. (Patient not taking: Reported on 11/22/2023) 30 tablet 0     No current facility-administered medications on file prior to visit.       The following portions of the patient's history were reviewed and updated as appropriate: allergies, current medications, past family history, past medical history, past social history, past surgical history, and problem list.    Review of Systems   Psychiatric/Behavioral:  Positive for depressed mood. The patient is nervous/anxious.        Objective   Physical Exam  Vitals and nursing note reviewed.   Constitutional:       Appearance: Normal appearance. She is well-developed.   HENT:      Head: Normocephalic and atraumatic.      Right Ear: External ear normal.      Left Ear: External ear normal.      Nose: Nose normal.   Eyes:      Extraocular Movements: Extraocular movements intact.      Pupils: Pupils are equal, round, and reactive to light.   Cardiovascular:      Rate and Rhythm: Normal rate and regular rhythm.      Heart sounds: Normal heart sounds.   Pulmonary:      Effort: Pulmonary effort is normal.      Breath sounds: Normal breath sounds.   Abdominal:      General: Bowel sounds are normal.      Palpations: Abdomen is soft.   Genitourinary:     Vagina: Normal.   Musculoskeletal:         General: Normal range of motion.      Cervical back: Normal range of motion and neck supple.   Skin:      General: Skin is warm and dry.   Neurological:      General: No focal deficit present.      Mental Status: She is alert and oriented to person, place, and time.   Psychiatric:         Mood and Affect: Mood normal.         Behavior: Behavior normal.         Judgment: Judgment normal.       PHQ-9 Total Score:      Assessment & Plan   Diagnoses and all orders for this visit:    1. Anxiety and depression (Primary)  Comments:  will increase lexapro today and pt encouraged to reach out if anxiety continues to be problematic and will add buspar.  Orders:  -     escitalopram (Lexapro) 20 MG tablet; Take 1 tablet by mouth Daily.  Dispense: 90 tablet; Refill: 1    2. Flu vaccine need  -     Fluzone >6 Months (0604-8993)        There are no Patient Instructions on file for this visit.

## 2024-05-22 ENCOUNTER — OFFICE VISIT (OUTPATIENT)
Dept: FAMILY MEDICINE CLINIC | Facility: CLINIC | Age: 39
End: 2024-05-22
Payer: MEDICAID

## 2024-05-22 VITALS
SYSTOLIC BLOOD PRESSURE: 98 MMHG | OXYGEN SATURATION: 98 % | TEMPERATURE: 98.6 F | HEART RATE: 68 BPM | BODY MASS INDEX: 23.96 KG/M2 | WEIGHT: 135.2 LBS | HEIGHT: 63 IN | DIASTOLIC BLOOD PRESSURE: 67 MMHG

## 2024-05-22 DIAGNOSIS — Z13.1 SCREENING FOR DIABETES MELLITUS: ICD-10-CM

## 2024-05-22 DIAGNOSIS — K74.60 HEPATIC CIRRHOSIS, UNSPECIFIED HEPATIC CIRRHOSIS TYPE, UNSPECIFIED WHETHER ASCITES PRESENT: ICD-10-CM

## 2024-05-22 DIAGNOSIS — Z00.00 ROUTINE GENERAL MEDICAL EXAMINATION AT A HEALTH CARE FACILITY: Primary | ICD-10-CM

## 2024-05-22 DIAGNOSIS — F41.9 ANXIETY AND DEPRESSION: ICD-10-CM

## 2024-05-22 DIAGNOSIS — F32.A ANXIETY AND DEPRESSION: ICD-10-CM

## 2024-05-22 DIAGNOSIS — Z12.4 CERVICAL CANCER SCREENING: ICD-10-CM

## 2024-05-22 PROBLEM — D64.9 ANEMIA: Status: ACTIVE | Noted: 2023-04-25

## 2024-05-22 PROCEDURE — 2014F MENTAL STATUS ASSESS: CPT | Performed by: NURSE PRACTITIONER

## 2024-05-22 PROCEDURE — 1159F MED LIST DOCD IN RCRD: CPT | Performed by: NURSE PRACTITIONER

## 2024-05-22 PROCEDURE — 83036 HEMOGLOBIN GLYCOSYLATED A1C: CPT | Performed by: NURSE PRACTITIONER

## 2024-05-22 PROCEDURE — 80050 GENERAL HEALTH PANEL: CPT | Performed by: NURSE PRACTITIONER

## 2024-05-22 PROCEDURE — 99395 PREV VISIT EST AGE 18-39: CPT | Performed by: NURSE PRACTITIONER

## 2024-05-22 PROCEDURE — 80061 LIPID PANEL: CPT | Performed by: NURSE PRACTITIONER

## 2024-05-22 PROCEDURE — 1160F RVW MEDS BY RX/DR IN RCRD: CPT | Performed by: NURSE PRACTITIONER

## 2024-05-22 PROCEDURE — 36415 COLL VENOUS BLD VENIPUNCTURE: CPT | Performed by: NURSE PRACTITIONER

## 2024-05-22 PROCEDURE — 1125F AMNT PAIN NOTED PAIN PRSNT: CPT | Performed by: NURSE PRACTITIONER

## 2024-05-22 NOTE — PROGRESS NOTES
Venipuncture performed on Left Arm by Hillary Haro MA  with good hemostasis. Patient tolerated well. 05/22/24

## 2024-05-22 NOTE — PROGRESS NOTES
"Subjective   Alondra Eduardo is a 39 y.o. female presents for   Chief Complaint   Patient presents with    Annual Exam    Anxiety    Depression       Health Maintenance Due   Topic Date Due    URINE MICROALBUMIN  Never done    Pneumococcal Vaccine 0-64 (1 of 2 - PCV) Never done    DIABETIC EYE EXAM  Never done    ANNUAL PHYSICAL  Never done    DIABETIC FOOT EXAM  Never done    PAP SMEAR  Never done    HEMOGLOBIN A1C  08/16/2021    COVID-19 Vaccine (2 - 2023-24 season) 09/01/2023       History of Present Illness   Pt present for annual exam.  She is taking medications as directed and denies problems or side effects.  She states anxiety and depression are managed well with lexapro.  Discussed liver disease with patient and she reports that she has not followed up with GI in quite a while.  She does routinely follow-up with hematology.  Discussed importance of balanced diet and routine exercise.  She states that she uses the walking trails at the park and goes skateboarding.  Also discussed risk and benefits of current recommended vaccines.  Discussed importance of routine cervical cancer screening, and patient would like a referral to GYN.    Vitals:    05/22/24 0859   BP: 98/67   BP Location: Right arm   Patient Position: Sitting   Cuff Size: Adult   Pulse: 68   Temp: 98.6 °F (37 °C)   TempSrc: Tympanic   SpO2: 98%   Weight: 61.3 kg (135 lb 3.2 oz)   Height: 160 cm (62.99\")     Body mass index is 23.96 kg/m².    Current Outpatient Medications on File Prior to Visit   Medication Sig Dispense Refill    Elderberry 500 MG capsule Take 1 capsule by mouth Daily.      escitalopram (Lexapro) 20 MG tablet Take 1 tablet by mouth Daily. 90 tablet 1    folic acid (FOLVITE) 1 MG tablet Take 1 tablet by mouth Daily.      levETIRAcetam XR (KEPPRA XR) 500 MG 24 hr tablet Take 2 tablets by mouth Every Night.      midodrine (PROAMATINE) 10 MG tablet Take 1 tablet by mouth 3 (Three) Times a Day Before Meals. 90 tablet 3    midodrine " (PROAMATINE) 5 MG tablet Take 1 tablet by mouth 3 (Three) Times a Day Before Meals. 270 tablet 3    thiamine (thiamine) 100 MG tablet tablet Take 1 tablet by mouth Daily. 30 tablet 0    zinc sulfate (ZINCATE) 220 (50 Zn) MG capsule Take 1 capsule by mouth Daily. 30 capsule 0    pantoprazole (PROTONIX) 40 MG EC tablet Take 1 tablet by mouth Daily. (Patient not taking: Reported on 5/22/2024)      Xifaxan 550 MG tablet Take 1 tablet by mouth Every 12 (Twelve) Hours. (Patient not taking: Reported on 5/22/2024)      [DISCONTINUED] ferrous sulfate 325 (65 FE) MG tablet Take 1 tablet by mouth Daily With Breakfast. (Patient not taking: Reported on 5/22/2024)      [DISCONTINUED] lactulose (CHRONULAC) 10 GM/15ML solution Take 45 mL by mouth 3 (Three) Times a Day. (Patient not taking: Reported on 11/22/2023) 30 mL 0    [DISCONTINUED] spironolactone (ALDACTONE) 50 MG tablet Take 3 tablets by mouth Daily. (Patient not taking: Reported on 11/22/2023) 30 tablet 0     No current facility-administered medications on file prior to visit.       The following portions of the patient's history were reviewed and updated as appropriate: allergies, current medications, past family history, past medical history, past social history, past surgical history, and problem list.    Review of Systems    Objective   Physical Exam  Vitals and nursing note reviewed.   Constitutional:       Appearance: Normal appearance. She is well-developed.   HENT:      Head: Normocephalic and atraumatic.      Right Ear: External ear normal.      Left Ear: External ear normal.      Nose: Nose normal.   Eyes:      Extraocular Movements: Extraocular movements intact.      Pupils: Pupils are equal, round, and reactive to light.   Cardiovascular:      Rate and Rhythm: Normal rate and regular rhythm.      Heart sounds: Normal heart sounds.   Pulmonary:      Effort: Pulmonary effort is normal.      Breath sounds: Normal breath sounds.   Abdominal:      General: Bowel  sounds are normal.      Palpations: Abdomen is soft.   Genitourinary:     Vagina: Normal.   Musculoskeletal:         General: Normal range of motion.      Cervical back: Normal range of motion and neck supple.   Skin:     General: Skin is warm and dry.   Neurological:      General: No focal deficit present.      Mental Status: She is alert and oriented to person, place, and time.   Psychiatric:         Mood and Affect: Mood normal.         Behavior: Behavior normal.         Judgment: Judgment normal.       PHQ-9 Total Score:      Assessment & Plan   Diagnoses and all orders for this visit:    1. Routine general medical examination at a health care facility (Primary)  -     CBC Auto Differential  -     Comprehensive Metabolic Panel  -     Lipid Panel  -     TSH    2. Screening for diabetes mellitus  -     Hemoglobin A1c    3. Cervical cancer screening  -     Ambulatory Referral to Gynecology    4. Anxiety and depression  Comments:  well controlled with lexapro    5. Hepatic cirrhosis, unspecified hepatic cirrhosis type, unspecified whether ascites present  Comments:  encouraged follow up with GI        There are no Patient Instructions on file for this visit.  Patient has been erroneously marked as diabetic. Based on the available clinical information, she does not have diabetes and should therefore be excluded from diabetic health maintenance and quality measures for the remainder of the reporting period.

## 2024-05-23 LAB
ALBUMIN SERPL-MCNC: 3.9 G/DL (ref 3.5–5.2)
ALBUMIN/GLOB SERPL: 1.3 G/DL
ALP SERPL-CCNC: 67 U/L (ref 39–117)
ALT SERPL W P-5'-P-CCNC: 16 U/L (ref 1–33)
ANION GAP SERPL CALCULATED.3IONS-SCNC: 8.6 MMOL/L (ref 5–15)
AST SERPL-CCNC: 15 U/L (ref 1–32)
BASOPHILS # BLD AUTO: 0.02 10*3/MM3 (ref 0–0.2)
BASOPHILS NFR BLD AUTO: 0.4 % (ref 0–1.5)
BILIRUB SERPL-MCNC: 0.9 MG/DL (ref 0–1.2)
BUN SERPL-MCNC: 5 MG/DL (ref 6–20)
BUN/CREAT SERPL: 7.1 (ref 7–25)
CALCIUM SPEC-SCNC: 8.9 MG/DL (ref 8.6–10.5)
CHLORIDE SERPL-SCNC: 107 MMOL/L (ref 98–107)
CHOLEST SERPL-MCNC: 122 MG/DL (ref 0–200)
CO2 SERPL-SCNC: 23.4 MMOL/L (ref 22–29)
CREAT SERPL-MCNC: 0.7 MG/DL (ref 0.57–1)
DEPRECATED RDW RBC AUTO: 43.8 FL (ref 37–54)
EGFRCR SERPLBLD CKD-EPI 2021: 114.4 ML/MIN/1.73
EOSINOPHIL # BLD AUTO: 0.1 10*3/MM3 (ref 0–0.4)
EOSINOPHIL NFR BLD AUTO: 2.1 % (ref 0.3–6.2)
ERYTHROCYTE [DISTWIDTH] IN BLOOD BY AUTOMATED COUNT: 15.4 % (ref 12.3–15.4)
GLOBULIN UR ELPH-MCNC: 2.9 GM/DL
GLUCOSE SERPL-MCNC: 93 MG/DL (ref 65–99)
HBA1C MFR BLD: 5.2 % (ref 4.8–5.6)
HCT VFR BLD AUTO: 34 % (ref 34–46.6)
HDLC SERPL-MCNC: 47 MG/DL (ref 40–60)
HGB BLD-MCNC: 11.5 G/DL (ref 12–15.9)
IMM GRANULOCYTES # BLD AUTO: 0.02 10*3/MM3 (ref 0–0.05)
IMM GRANULOCYTES NFR BLD AUTO: 0.4 % (ref 0–0.5)
LDLC SERPL CALC-MCNC: 65 MG/DL (ref 0–100)
LDLC/HDLC SERPL: 1.43 {RATIO}
LYMPHOCYTES # BLD AUTO: 0.97 10*3/MM3 (ref 0.7–3.1)
LYMPHOCYTES NFR BLD AUTO: 20.3 % (ref 19.6–45.3)
MCH RBC QN AUTO: 26.4 PG (ref 26.6–33)
MCHC RBC AUTO-ENTMCNC: 33.8 G/DL (ref 31.5–35.7)
MCV RBC AUTO: 78.2 FL (ref 79–97)
MONOCYTES # BLD AUTO: 0.39 10*3/MM3 (ref 0.1–0.9)
MONOCYTES NFR BLD AUTO: 8.1 % (ref 5–12)
NEUTROPHILS NFR BLD AUTO: 3.29 10*3/MM3 (ref 1.7–7)
NEUTROPHILS NFR BLD AUTO: 68.7 % (ref 42.7–76)
PLATELET # BLD AUTO: 93 10*3/MM3 (ref 140–450)
PMV BLD AUTO: 11.6 FL (ref 6–12)
POTASSIUM SERPL-SCNC: 3.9 MMOL/L (ref 3.5–5.2)
PROT SERPL-MCNC: 6.8 G/DL (ref 6–8.5)
RBC # BLD AUTO: 4.35 10*6/MM3 (ref 3.77–5.28)
SODIUM SERPL-SCNC: 139 MMOL/L (ref 136–145)
TRIGL SERPL-MCNC: 39 MG/DL (ref 0–150)
TSH SERPL DL<=0.05 MIU/L-ACNC: 1.17 UIU/ML (ref 0.27–4.2)
VLDLC SERPL-MCNC: 10 MG/DL (ref 5–40)
WBC NRBC COR # BLD AUTO: 4.79 10*3/MM3 (ref 3.4–10.8)

## 2024-06-13 ENCOUNTER — TELEPHONE (OUTPATIENT)
Dept: FAMILY MEDICINE CLINIC | Facility: CLINIC | Age: 39
End: 2024-06-13
Payer: MEDICAID

## 2024-07-11 ENCOUNTER — TELEPHONE (OUTPATIENT)
Dept: FAMILY MEDICINE CLINIC | Facility: CLINIC | Age: 39
End: 2024-07-11
Payer: MEDICAID

## 2024-07-11 NOTE — TELEPHONE ENCOUNTER
[facility] did patients labs this week and wanted to let you know platelets were 64.  NP spoke to patient and she stated they are consistently low. They are faxing the results.    Elodia Thurston NP

## 2024-08-20 DIAGNOSIS — F41.9 ANXIETY AND DEPRESSION: ICD-10-CM

## 2024-08-20 DIAGNOSIS — F32.A ANXIETY AND DEPRESSION: ICD-10-CM

## 2024-08-20 RX ORDER — ESCITALOPRAM OXALATE 20 MG/1
20 TABLET ORAL DAILY
Qty: 90 TABLET | Refills: 0 | Status: SHIPPED | OUTPATIENT
Start: 2024-08-20

## 2024-11-12 DIAGNOSIS — F41.9 ANXIETY AND DEPRESSION: ICD-10-CM

## 2024-11-12 DIAGNOSIS — F32.A ANXIETY AND DEPRESSION: ICD-10-CM

## 2024-11-12 RX ORDER — ESCITALOPRAM OXALATE 20 MG/1
20 TABLET ORAL DAILY
Qty: 90 TABLET | Refills: 0 | Status: SHIPPED | OUTPATIENT
Start: 2024-11-12

## 2025-02-13 DIAGNOSIS — F32.A ANXIETY AND DEPRESSION: ICD-10-CM

## 2025-02-13 DIAGNOSIS — F41.9 ANXIETY AND DEPRESSION: ICD-10-CM

## 2025-02-13 RX ORDER — ESCITALOPRAM OXALATE 20 MG/1
20 TABLET ORAL DAILY
Qty: 90 TABLET | Refills: 0 | Status: SHIPPED | OUTPATIENT
Start: 2025-02-13

## 2025-07-06 DIAGNOSIS — F41.9 ANXIETY AND DEPRESSION: ICD-10-CM

## 2025-07-06 DIAGNOSIS — F32.A ANXIETY AND DEPRESSION: ICD-10-CM

## 2025-07-07 RX ORDER — ESCITALOPRAM OXALATE 20 MG/1
20 TABLET ORAL DAILY
Qty: 90 TABLET | Refills: 0 | OUTPATIENT
Start: 2025-07-07

## (undated) DEVICE — PAPR PRNT PK SONY W RIBN UPC55

## (undated) DEVICE — BITEBLOCK ENDO W/STRAP 60F A/ LF DISP

## (undated) DEVICE — PK ENDO GI 50

## (undated) DEVICE — MULTIPLE BAND LIGATOR: Brand: SPEEDBAND SUPERVIEW SUPER 7

## (undated) DEVICE — KT SYR GEL ORISE SNGL PK 10ML